# Patient Record
Sex: MALE | Race: BLACK OR AFRICAN AMERICAN
[De-identification: names, ages, dates, MRNs, and addresses within clinical notes are randomized per-mention and may not be internally consistent; named-entity substitution may affect disease eponyms.]

---

## 2017-01-08 ENCOUNTER — HOSPITAL ENCOUNTER (EMERGENCY)
Dept: HOSPITAL 17 - NEPE | Age: 22
Discharge: HOME | End: 2017-01-08
Payer: COMMERCIAL

## 2017-01-08 VITALS — WEIGHT: 141.1 LBS | HEIGHT: 66 IN | BODY MASS INDEX: 22.68 KG/M2

## 2017-01-08 VITALS
HEART RATE: 114 BPM | OXYGEN SATURATION: 95 % | SYSTOLIC BLOOD PRESSURE: 126 MMHG | RESPIRATION RATE: 24 BRPM | DIASTOLIC BLOOD PRESSURE: 101 MMHG

## 2017-01-08 VITALS
OXYGEN SATURATION: 98 % | RESPIRATION RATE: 18 BRPM | DIASTOLIC BLOOD PRESSURE: 62 MMHG | HEART RATE: 87 BPM | TEMPERATURE: 98.1 F | SYSTOLIC BLOOD PRESSURE: 123 MMHG

## 2017-01-08 VITALS — RESPIRATION RATE: 18 BRPM | OXYGEN SATURATION: 99 %

## 2017-01-08 DIAGNOSIS — Z86.2: ICD-10-CM

## 2017-01-08 DIAGNOSIS — Z86.59: ICD-10-CM

## 2017-01-08 DIAGNOSIS — Z87.19: ICD-10-CM

## 2017-01-08 DIAGNOSIS — Z87.01: ICD-10-CM

## 2017-01-08 DIAGNOSIS — R07.9: ICD-10-CM

## 2017-01-08 DIAGNOSIS — M25.50: ICD-10-CM

## 2017-01-08 DIAGNOSIS — D57.00: Primary | ICD-10-CM

## 2017-01-08 DIAGNOSIS — Z86.79: ICD-10-CM

## 2017-01-08 LAB
ALP SERPL-CCNC: 85 U/L (ref 45–117)
ALT SERPL-CCNC: 22 U/L (ref 12–78)
ANION GAP SERPL CALC-SCNC: 9 MEQ/L (ref 5–15)
AST SERPL-CCNC: 66 U/L (ref 15–37)
BASOPHILS # BLD AUTO: 0.1 TH/MM3 (ref 0–0.2)
BASOPHILS NFR BLD AUTO: 1 % (ref 0–2)
BASOPHILS NFR BLD: 0.4 % (ref 0–2)
BILIRUB SERPL-MCNC: 3.2 MG/DL (ref 0.2–1)
BUN SERPL-MCNC: 8 MG/DL (ref 7–18)
CHLORIDE SERPL-SCNC: 109 MEQ/L (ref 98–107)
EOSINOPHIL # BLD: 0.5 TH/MM3 (ref 0–0.4)
EOSINOPHIL NFR BLD: 3 % (ref 0–4)
EOSINOPHIL NFR BLD: 4 % (ref 0–4)
ERYTHROCYTE [DISTWIDTH] IN BLOOD BY AUTOMATED COUNT: 18.4 % (ref 11.6–17.2)
GFR SERPLBLD BASED ON 1.73 SQ M-ARVRAT: 185 ML/MIN (ref 89–?)
HCO3 BLD-SCNC: 24.1 MEQ/L (ref 21–32)
HCT VFR BLD CALC: 23.9 % (ref 39–51)
HEMO FLAGS: (no result)
HGB S BLD QL SOLY: (no result)
HOWELL-JOLLY BOD BLD QL SMEAR: PRESENT
LYMPHOCYTES # BLD AUTO: 8.8 TH/MM3 (ref 1–4.8)
LYMPHOCYTES NFR BLD AUTO: 50.7 % (ref 9–44)
MCH RBC QN AUTO: 26.2 PG (ref 27–34)
MCHC RBC AUTO-ENTMCNC: 33 % (ref 32–36)
MCV RBC AUTO: 79.3 FL (ref 80–100)
MONOCYTES NFR BLD: 5.9 % (ref 0–8)
MYELOCYTES NFR BLD: 1 % (ref 0–0)
NEUTROPHILS # BLD AUTO: 7 TH/MM3 (ref 1.8–7.7)
NEUTROPHILS NFR BLD AUTO: 40 % (ref 16–70)
NEUTS BAND # BLD MANUAL: 6.1 TH/MM3 (ref 1.8–7.7)
NEUTS BAND NFR BLD: 2 % (ref 0–6)
NEUTS SEG NFR BLD MANUAL: 32 % (ref 16–70)
PLAT MORPH BLD: NORMAL
PLATELET # BLD: 257 TH/MM3 (ref 150–450)
PLATELET BLD QL SMEAR: NORMAL
POLYCHROMASIA BLD QL SMEAR: 3.4 % (ref 0–1.9)
POTASSIUM SERPL-SCNC: 4.8 MEQ/L (ref 3.5–5.1)
RBC # BLD AUTO: 3.02 MIL/MM3 (ref 4.5–5.9)
RETICS/RBC NFR: 11.1 % (ref 0.4–3)
REVIEW FLAG: (no result)
SCAN/DIFF: (no result)
SCHISTOCYTES BLD QL SMEAR: (no result)
SODIUM SERPL-SCNC: 142 MEQ/L (ref 136–145)
WBC # BLD AUTO: 17.4 TH/MM3 (ref 4–11)
WBC DIFF SAMPLE: 100
WBC NRBC COR # BLD: 2 /100 WBC (ref 0–0)

## 2017-01-08 PROCEDURE — 99284 EMERGENCY DEPT VISIT MOD MDM: CPT

## 2017-01-08 PROCEDURE — 96374 THER/PROPH/DIAG INJ IV PUSH: CPT

## 2017-01-08 PROCEDURE — 85044 MANUAL RETICULOCYTE COUNT: CPT

## 2017-01-08 PROCEDURE — 96376 TX/PRO/DX INJ SAME DRUG ADON: CPT

## 2017-01-08 PROCEDURE — 85007 BL SMEAR W/DIFF WBC COUNT: CPT

## 2017-01-08 PROCEDURE — 96375 TX/PRO/DX INJ NEW DRUG ADDON: CPT

## 2017-01-08 PROCEDURE — 71010: CPT

## 2017-01-08 PROCEDURE — 80053 COMPREHEN METABOLIC PANEL: CPT

## 2017-01-08 PROCEDURE — 85027 COMPLETE CBC AUTOMATED: CPT

## 2017-01-08 NOTE — PD
HPI


Chief Complaint:  pain


Time Seen by Provider:  07:26


Travel History


International Travel<30 days:  No


Contact w/Intl Traveler<30days:  No





History of Present Illness


HPI


This is a 21-year-old male who has a history sickle cell disease who presents 

to the emergency department with pain all over his body, worse in his chest, 

arms and legs.  He says it started abruptly at 3 AM this morning.  Patient 

provides very limited history because he is moaning in pain and is poorly 

cooperative with questioning.





PFSH


Past Medical History


Hx Anticoagulant Therapy:  No


Anemia:  Yes


Arthritis:  No


Asthma:  No


Autoimmune Disease:  No


Blood Disorders:  Yes ( G6PD DEFICIENCY)


Anxiety:  No


Depression:  No


Heart Rhythm Problems:  No


Cancer:  No


Cardiovascular Problems:  Yes (heart murmur)


High Cholesterol:  No


Chemotherapy:  No


Chest Pain:  Yes


Congestive Heart Failure:  No


COPD:  No


Cerebrovascular Accident:  No


Diabetes:  No


Diminished Hearing:  No


Endocrine:  No


Gastrointestinal Disorders:  Yes (gall stones)


GERD:  No


Genitourinary:  No


Hiatal Hernia:  No


Immune Disorder:  Yes (SICKLE CELL)


Implanted Vascular Access Dvce:  No


Kidney Stones:  No


Musculoskeletal:  No


Neurologic:  No


Psychiatric:  Yes


Reproductive:  No


Respiratory:  No


Immunizations Current:  Yes


Migraines:  No


Pneumonia:  Yes


Radiation Therapy:  No


Renal Failure:  No


Seizures:  No


Sickle Cell Disease:  Yes


Sleep Apnea:  No


Thyroid Disease:  No


Ulcer:  No





Past Surgical History


Abdominal Surgery:  No


AICD:  No


Arteriovenous Shunt:  No


Cardiac Surgery:  No


Ear Surgery:  No


Endocrine Surgery:  No


Eye Surgery:  No


Genitourinary Surgery:  No


Gynecologic Surgery:  No


Hysterectomy:  No


Insulin Pump:  No


Joint Replacement:  No


Oral Surgery:  Yes (dental surgery "AS A SMALL CHILD")


Pacemaker:  No


Thoracic Surgery:  No


Other Surgery:  Yes (PICC LINE /picc line removal)





Social History


Alcohol Use:  No


Tobacco Use:  No


Substance Use:  No





Allergies-Medications


(Allergen,Severity, Reaction):  


Coded Allergies:  


     Sulfa (Verified  Allergy, Intermediate, 1/8/17)


     Morphine (Verified  Allergy, Unknown, 1/8/17)


Uncoded Allergies:  


     LEONARD BEANS (Allergy, Intermediate, 10/10/14)


Reported Meds & Prescriptions





Reported Meds & Active Scripts


Active


Percocet (Oxycodone-Acetaminophen)  mg Tab 1 Tab PO Q4H PRN


Reported


Hydroxyurea 500 Mg Cap 500 Mg PO DAILY


Folic Acid 400 Mcg Tab 400 Mcg PO DAILY








Review of Systems


ROS Limitations:  Uncooperative





Physical Exam


Narrative


GENERAL: Uncomfortable appearing, writhing in the bed


SKIN: Warm and dry.


HEAD: Atraumatic. Normocephalic. 


EYES: Pupils equal and round.  No injection or drainage. 


ENT:  Moist mucous membranes


NECK: Trachea midline. 


CARDIOVASCULAR: Tachycardic No murmur appreciated.


RESPIRATORY: Clear to auscultation. Breath sounds equal bilaterally. 


GASTROINTESTINAL: Abdomen soft, non-tender, nondistended. 


MUSCULOSKELETAL: No obvious deformities. 


NEUROLOGICAL: Awake and alert. No obvious cranial nerve deficits.  Moving all 

extremities.


PSYCHIATRIC: Appropriate mood and affect; insight and judgment normal.





Data


Data


Last Documented VS





Vital Signs








  Date Time  Temp Pulse Resp B/P Pulse Ox O2 Delivery O2 Flow Rate FiO2


 


1/8/17 09:03 98.1 87 18 123/62 98 Room Air  








Orders








 Complete Blood Count With Diff (1/8/17 07:28)


Comprehensive Metabolic Panel (1/8/17 07:28)


Retic Count (1/8/17 07:28)


Chest, Single Ap (1/8/17 07:28)


Ecg Monitoring (1/8/17 07:28)


Iv Access Insert/Monitor (1/8/17 07:28)


Oximetry (1/8/17 07:28)


Ondansetron Inj (Zofran Inj) (1/8/17 07:30)


Sodium Chloride 0.9% Flush (Ns Flush) (1/8/17 07:30)


Sodium Chlor 0.9% 1000 Ml Inj (Ns 1000 M (1/8/17 07:28)


Hydromorphone Pf Inj (Dilaudid Pf Inj) (1/8/17 07:30)


Hydromorphone Pf Inj (Dilaudid Pf Inj) (1/8/17 08:01)


Ondansetron Inj (Zofran Inj) (1/8/17 08:01)


Diphenhydramine Inj (Benadryl Inj) (1/8/17 08:24)


Hydromorphone Pf Inj (Dilaudid Pf Inj) (1/8/17 08:45)


Diphenhydramine Inj (Benadryl Inj) (1/8/17 08:45)


Hydromorphone Pf Inj (Dilaudid Pf Inj) (1/8/17 08:38)








Labs








 Laboratory Tests








Test 1/8/17





 07:37


 


White Blood Count 17.4 TH/MM3


 


Red Blood Count 3.02 MIL/MM3


 


Hemoglobin 7.9 GM/DL


 


Hematocrit 23.9 %


 


Mean Corpuscular Volume 79.3 FL


 


Mean Corpuscular Hemoglobin 26.2 PG


 


Mean Corpuscular Hemoglobin 33.0 %





Concent 


 


Red Cell Distribution Width 18.4 %


 


Platelet Count 257 TH/MM3


 


Mean Platelet Volume 8.2 FL


 


Neutrophils (%) (Auto) 40.0 %


 


Lymphocytes (%) (Auto) 50.7 %


 


Monocytes (%) (Auto) 5.9 %


 


Eosinophils (%) (Auto) 3.0 %


 


Basophils (%) (Auto) 0.4 %


 


Neutrophils # (Auto) 7.0 TH/MM3


 


Lymphocytes # (Auto) 8.8 TH/MM3


 


Monocytes # (Auto) 1.0 TH/MM3


 


Eosinophils # (Auto) 0.5 TH/MM3


 


Basophils # (Auto) 0.1 TH/MM3


 


CBC Comment AUTO DIFF 


 


Differential Total Cells 100 





Counted 


 


Neutrophils % (Manual) 32 %


 


Band Neutrophils % 2 %


 


Lymphocytes % 57 %


 


Monocytes % 3 %


 


Eosinophils % 4 %


 


Basophils % 1 %


 


Neutrophils # (Manual) 6.1 TH/MM3


 


Myelocytes 1 %


 


Nucleated Red Blood Cells 2 /100 WBC


 


Differential Comment FINAL DIFF





 MANUAL


 


Platelet Estimate NORMAL 


 


Platelet Morphology Comment NORMAL 


 


Polychromasia 3.4 %


 


Sickle Cells 1+ 


 


[place] Bodies PRESENT 


 


Keratocytes OCC 


 


Reticulocyte Count 11.1 %


 


Absolute Reticulocyte Count 336.3 MIL/L


 


Sodium Level 142 MEQ/L


 


Potassium Level 4.8 MEQ/L


 


Chloride Level 109 MEQ/L


 


Carbon Dioxide Level 24.1 MEQ/L


 


Anion Gap 9 MEQ/L


 


Blood Urea Nitrogen 8 MG/DL


 


Creatinine 0.65 MG/DL


 


Estimat Glomerular Filtration 185 ML/MIN





Rate 


 


Random Glucose 99 MG/DL


 


Calcium Level 8.9 MG/DL


 


Total Bilirubin 3.2 MG/DL


 


Aspartate Amino Transf 66 U/L





(AST/SGOT) 


 


Alanine Aminotransferase 22 U/L





(ALT/SGPT) 


 


Alkaline Phosphatase 85 U/L


 


Total Protein 8.1 GM/DL


 


Albumin 4.7 GM/DL














MDM


Medical Decision Making


Medical Screen Exam Complete:  Yes


Emergency Medical Condition:  Yes


Interpretation(s)


Afebrile, mild tachycardia, hypertensive


Leukocytosis with lymphocytic shift


Hemoglobin is 7.9


Electrolytes are reassuring with a total bilirubin of 3.2





Last 24 hours Impressions








Chest X-Ray 1/8/17 0728 Signed





Impressions: 





 Service Date/Time:  Sunday, January 8, 2017 07:57 - CONCLUSION: No acute 





 disease.       Zain Murray MD  FACR








Differential Diagnosis


Pneumonia, acute chest syndrome, pulmonary embolism, vaso-occlusive crisis


Narrative Course


This is a 21-year-old male who presents to the emergency department with severe 

pain in his joints and chest.  Patient has a history of sickle cell disease.  I 

suspect his crisis may been precipitated by the cold weather.  He was placed on 

a monitor and an IV was established.  He was given 3 mg of IV Dilaudid as well 

as IV hydration.  He does have a white count of 17 with a lymphocytic shift.  

He has no fever and no symptoms of infection in his chest is reassuring so I 

suspect this is a stress response in the setting of his crisis.  I offered the 

patient admission but the patient would like to go home.  Patient will be 

discharged and has pain medication at home.  He was told to return to the 

emergency department if his symptoms worsen





Diagnosis





 Primary Impression:  


 Vaso-occlusive sickle cell crisis


Patient Instructions:  General Instructions





***Additional Instructions:


If you develop severe chest pain, shortness of breath, sweating, lightheadedness

, dizziness or difficulty breathing return to the emergency department 

immediately.


Followup with your primary care physician in 2-3 days if your symptoms are not 

resolved.


***Med/Other Pt SpecificInfo:  No Change to Meds


Disposition:  01 DISCHARGE HOME


Condition:  Stable








Josephine Zamora MD Jan 8, 2017 07:30

## 2017-01-08 NOTE — RADRPT
EXAM DATE/TIME:  01/08/2017 07:57 

 

[facility] COMPARISON:     

CHEST SINGLE AP, November 08, 2016, 4:46.

 

                     

INDICATIONS :     

Shortness of breath and pain from sickle cell crisis.

                     

 

MEDICAL HISTORY :     

Sickle Cell disease.          

 

SURGICAL HISTORY :     

None.   

 

ENCOUNTER:     

Initial                                        

 

ACUITY:     

2 days      

 

PAIN SCORE:     

7/10

 

LOCATION:     

Bilateral chest 

 

FINDINGS:     

A single view of the chest demonstrates the lungs to be symmetrically aerated without evidence of mas
s, infiltrate or effusion.  The cardiomediastinal contours are unremarkable.  Osseous structures are 
intact.

 

CONCLUSION:     No acute disease.  

 

 

 

 Zain Murray MD FACR on January 08, 2017 at 8:11           

Board Certified Radiologist.

 This report was verified electronically.

## 2017-01-23 ENCOUNTER — HOSPITAL ENCOUNTER (EMERGENCY)
Dept: HOSPITAL 17 - NEPC | Age: 22
Discharge: HOME | End: 2017-01-23
Payer: COMMERCIAL

## 2017-01-23 VITALS
OXYGEN SATURATION: 100 % | HEART RATE: 90 BPM | TEMPERATURE: 97.6 F | DIASTOLIC BLOOD PRESSURE: 72 MMHG | RESPIRATION RATE: 20 BRPM | SYSTOLIC BLOOD PRESSURE: 121 MMHG

## 2017-01-23 VITALS — HEIGHT: 67 IN | BODY MASS INDEX: 21.97 KG/M2 | WEIGHT: 139.99 LBS

## 2017-01-23 VITALS — SYSTOLIC BLOOD PRESSURE: 126 MMHG | DIASTOLIC BLOOD PRESSURE: 77 MMHG

## 2017-01-23 VITALS — RESPIRATION RATE: 16 BRPM

## 2017-01-23 DIAGNOSIS — D57.00: Primary | ICD-10-CM

## 2017-01-23 DIAGNOSIS — Z79.899: ICD-10-CM

## 2017-01-23 DIAGNOSIS — R01.1: ICD-10-CM

## 2017-01-23 LAB
ANION GAP SERPL CALC-SCNC: 9 MEQ/L (ref 5–15)
BASOPHILS # BLD AUTO: 0.1 TH/MM3 (ref 0–0.2)
BASOPHILS NFR BLD: 0.6 % (ref 0–2)
BUN SERPL-MCNC: 12 MG/DL (ref 7–18)
CHLORIDE SERPL-SCNC: 108 MEQ/L (ref 98–107)
EOSINOPHIL # BLD: 0.3 TH/MM3 (ref 0–0.4)
EOSINOPHIL NFR BLD: 2.8 % (ref 0–4)
EOSINOPHIL NFR BLD: 3 % (ref 0–4)
ERYTHROCYTE [DISTWIDTH] IN BLOOD BY AUTOMATED COUNT: 16 % (ref 11.6–17.2)
GFR SERPLBLD BASED ON 1.73 SQ M-ARVRAT: 147 ML/MIN (ref 89–?)
HCO3 BLD-SCNC: 26.5 MEQ/L (ref 21–32)
HCT VFR BLD CALC: 25.2 % (ref 39–51)
HEMO FLAGS: (no result)
HGB S BLD QL SOLY: (no result)
HOWELL-JOLLY BOD BLD QL SMEAR: PRESENT
LYMPHOCYTES # BLD AUTO: 4 TH/MM3 (ref 1–4.8)
LYMPHOCYTES NFR BLD AUTO: 38 % (ref 9–44)
MCH RBC QN AUTO: 26.3 PG (ref 27–34)
MCHC RBC AUTO-ENTMCNC: 33.9 % (ref 32–36)
MCV RBC AUTO: 77.6 FL (ref 80–100)
MONOCYTES NFR BLD: 9.7 % (ref 0–8)
MYELOCYTES NFR BLD: 1 % (ref 0–0)
NEUTROPHILS # BLD AUTO: 5.2 TH/MM3 (ref 1.8–7.7)
NEUTROPHILS NFR BLD AUTO: 48.9 % (ref 16–70)
NEUTS BAND # BLD MANUAL: 5.2 TH/MM3 (ref 1.8–7.7)
NEUTS BAND NFR BLD: 2 % (ref 0–6)
NEUTS SEG NFR BLD MANUAL: 46 % (ref 16–70)
PLAT MORPH BLD: NORMAL
PLATELET # BLD: 289 TH/MM3 (ref 150–450)
PLATELET BLD QL SMEAR: NORMAL
POLYCHROMASIA BLD QL SMEAR: 3 % (ref 0–1.9)
POTASSIUM SERPL-SCNC: 3.8 MEQ/L (ref 3.5–5.1)
RBC # BLD AUTO: 3.24 MIL/MM3 (ref 4.5–5.9)
SCAN/DIFF: (no result)
SODIUM SERPL-SCNC: 143 MEQ/L (ref 136–145)
TARGETS BLD QL SMEAR: (no result)
WBC # BLD AUTO: 10.7 TH/MM3 (ref 4–11)
WBC DIFF SAMPLE: 100
WBC NRBC COR # BLD: 4 /100 WBC (ref 0–0)

## 2017-01-23 PROCEDURE — 99284 EMERGENCY DEPT VISIT MOD MDM: CPT

## 2017-01-23 PROCEDURE — 96361 HYDRATE IV INFUSION ADD-ON: CPT

## 2017-01-23 PROCEDURE — 80048 BASIC METABOLIC PNL TOTAL CA: CPT

## 2017-01-23 PROCEDURE — 85027 COMPLETE CBC AUTOMATED: CPT

## 2017-01-23 PROCEDURE — 96375 TX/PRO/DX INJ NEW DRUG ADDON: CPT

## 2017-01-23 PROCEDURE — 96376 TX/PRO/DX INJ SAME DRUG ADON: CPT

## 2017-01-23 PROCEDURE — 96374 THER/PROPH/DIAG INJ IV PUSH: CPT

## 2017-01-23 PROCEDURE — 85007 BL SMEAR W/DIFF WBC COUNT: CPT

## 2017-01-23 NOTE — PD
Physical Exam


Date Seen by Provider:  Jan 23, 2017


Time Seen by Provider:  07:00


Narrative


Patient signed out to me by Dr. Matthew at 7 AM, please see previous notes for 

further details.  Patient has history of sickle cell disease, here with an 

exacerbation, IV fluids and pain medications have been ordered for the patient.

  He was given additional dose of medications and on reevaluation at 8:50 AM, 

he is feeling improved and feels ready to go home.  At this point, lab work did 

not indicate any significant metabolic issues, infections, or significant 

anemia.  H&H appears to be baseline for this patient.  My plan would be to 

release him with follow-up to his hematologist.  Return for any worsening in 

symptoms as necessary.  The plan has been discussed with the patient and he 

states understanding.  Vital signs are stable in the ER.








Laboratory Tests








Test 1/23/17





 06:55


 


Red Blood Count 3.24 MIL/MM3





 (4.50-5.90)


 


Hemoglobin 8.5 GM/DL





 (13.0-17.0)


 


Hematocrit 25.2 %





 (39.0-51.0)


 


Mean Corpuscular Volume 77.6 FL





 (80.0-100.0)


 


Mean Corpuscular Hemoglobin 26.3 PG





 (27.0-34.0)


 


Monocytes (%) (Auto) 9.7 % (0.0-8.0)


 


Monocytes # (Auto) 1.0 TH/MM3





 (0-0.9)


 


Lymphocytes % 45 % (9-44)


 


Myelocytes 1 % (0-0)


 


Nucleated Red Blood Cells 4 /100 WBC





 (0-0)


 


Polychromasia 3.0 % (0.0-1.9)


 


Sickle Cells 1+  (NORMAL)


 


Target Cells 1+  (NORMAL)


 


Chloride Level 108 MEQ/L





 ()











Data


Data


Last Documented VS





Vital Signs








  Date Time  Temp Pulse Resp B/P Pulse Ox O2 Delivery O2 Flow Rate FiO2


 


1/23/17 07:40   16     


 


1/23/17 06:09 97.6 90  121/72 100   








Orders





 Basic Metabolic Panel (Bmp) (1/23/17 06:45)


Complete Blood Count With Diff (1/23/17 06:45)


Ecg Monitoring (1/23/17 06:45)


Iv Access Insert/Monitor (1/23/17 06:45)


Oximetry (1/23/17 06:45)


Ondansetron Inj (Zofran Inj) (1/23/17 06:45)


Sodium Chloride 0.9% Flush (Ns Flush) (1/23/17 06:45)


Sodium Chlor 0.9% 1000 Ml Inj (Ns 1000 M (1/23/17 06:45)


Hydromorphone Pf Inj (Dilaudid Pf Inj) (1/23/17 06:45)


Diphenhydramine Inj (Benadryl Inj) (1/23/17 06:45)


Hydromorphone Pf Inj (Dilaudid Pf Inj) (1/23/17 08:00)





Labs





 Laboratory Tests








Test 1/23/17





 06:55


 


White Blood Count 10.7 TH/MM3


 


Red Blood Count 3.24 MIL/MM3


 


Hemoglobin 8.5 GM/DL


 


Hematocrit 25.2 %


 


Mean Corpuscular Volume 77.6 FL


 


Mean Corpuscular Hemoglobin 26.3 PG


 


Mean Corpuscular Hemoglobin 33.9 %





Concent 


 


Red Cell Distribution Width 16.0 %


 


Platelet Count 289 TH/MM3


 


Mean Platelet Volume 8.9 FL


 


Neutrophils (%) (Auto) 48.9 %


 


Lymphocytes (%) (Auto) 38.0 %


 


Monocytes (%) (Auto) 9.7 %


 


Eosinophils (%) (Auto) 2.8 %


 


Basophils (%) (Auto) 0.6 %


 


Neutrophils # (Auto) 5.2 TH/MM3


 


Lymphocytes # (Auto) 4.0 TH/MM3


 


Monocytes # (Auto) 1.0 TH/MM3


 


Eosinophils # (Auto) 0.3 TH/MM3


 


Basophils # (Auto) 0.1 TH/MM3


 


CBC Comment AUTO DIFF 


 


Differential Total Cells 100 





Counted 


 


Neutrophils % (Manual) 46 %


 


Band Neutrophils % 2 %


 


Lymphocytes % 45 %


 


Monocytes % 3 %


 


Eosinophils % 3 %


 


Neutrophils # (Manual) 5.2 TH/MM3


 


Myelocytes 1 %


 


Nucleated Red Blood Cells 4 /100 WBC


 


Differential Comment FINAL DIFF





 MANUAL


 


Platelet Estimate NORMAL 


 


Platelet Morphology Comment NORMAL 


 


Polychromasia 3.0 %


 


Sickle Cells 1+ 


 


Target Cells 1+ 


 


[place] Bodies PRESENT 


 


Red Cell Morphology Comment  


 


Sodium Level 143 MEQ/L


 


Potassium Level 3.8 MEQ/L


 


Chloride Level 108 MEQ/L


 


Carbon Dioxide Level 26.5 MEQ/L


 


Anion Gap 9 MEQ/L


 


Blood Urea Nitrogen 12 MG/DL


 


Creatinine 0.79 MG/DL


 


Estimat Glomerular Filtration 147 ML/MIN





Rate 


 


Random Glucose 77 MG/DL


 


Calcium Level 9.0 MG/DL











[facility]


Medical Record Reviewed:  Yes


Supervised Visit with EDA:  No


Diagnosis





 Primary Impression:  


 Sickle cell disease with crisis


***Med/Other Pt SpecificInfo:  Prescription(s) given


Scripts


Oxycodone-Acetaminophen (Percocet)5-325 mg Tab1-2 Tab PO Q6H PRN (PAIN) #20 TAB

  Ref 0


   Prov:Agata Pratt MD         1/23/17


Disposition:  01 DISCHARGE HOME


Condition:  Stable








Agata Pratt MD Jan 23, 2017 08:58

## 2017-01-23 NOTE — PD
HPI


Chief Complaint:  Sickle Cell


Time Seen by Provider:  06:22


Travel History


International Travel<30 days:  No


Contact w/Intl Traveler<30days:  No


Traveled to known affect area:  No





History of Present Illness


HPI


21-year-old male history sickle cell disease arrives complaining of pain in his 

lower back and his legs bilaterally.  It's constant and severe.  It's typical 

for a sickle cell type pain syndrome for the patient.  It started about 9 hours 

prior to ER arrival.  He tried over-the-counter Tylenol did not work.  He has 

been compliant with hydroxyurea and folate.  He follows with Dr. Mendoza 

hematology oncology.  He's had no fever.





PFSH


Past Medical History


Hx Anticoagulant Therapy:  No


Anemia:  Yes


Arthritis:  No


Asthma:  No


Autoimmune Disease:  No


Blood Disorders:  Yes ( G6PD DEFICIENCY)


Anxiety:  No


Depression:  No


Heart Rhythm Problems:  No


Cancer:  No


Cardiovascular Problems:  Yes (heart murmur)


High Cholesterol:  No


Chemotherapy:  No


Chest Pain:  Yes


Congestive Heart Failure:  No


COPD:  No


Cerebrovascular Accident:  No


Diabetes:  No


Diminished Hearing:  No


Endocrine:  No


Gastrointestinal Disorders:  Yes (gall stones)


GERD:  No


Genitourinary:  No


Hiatal Hernia:  No


Immune Disorder:  Yes (SICKLE CELL)


Implanted Vascular Access Dvce:  No


Kidney Stones:  No


Medical other:  Yes (g6pd deficiency)


Musculoskeletal:  No


Neurologic:  No


Psychiatric:  Yes


Reproductive:  No


Respiratory:  No


Immunizations Current:  Yes


Migraines:  No


Pneumonia:  Yes


Radiation Therapy:  No


Renal Failure:  No


Seizures:  No


Sickle Cell Disease:  Yes


Sleep Apnea:  No


Thyroid Disease:  No


Ulcer:  No





Past Surgical History


Abdominal Surgery:  No


AICD:  No


Arteriovenous Shunt:  No


Cardiac Surgery:  No


Ear Surgery:  No


Endocrine Surgery:  No


Eye Surgery:  No


Genitourinary Surgery:  No


Gynecologic Surgery:  No


Hysterectomy:  No


Insulin Pump:  No


Joint Replacement:  No


Oral Surgery:  Yes (dental surgery "AS A SMALL CHILD")


Pacemaker:  No


Thoracic Surgery:  No


Other Surgery:  Yes (PICC LINE /picc line removal)





Social History


Alcohol Use:  No


Tobacco Use:  No


Substance Use:  No





Allergies-Medications


(Allergen,Severity, Reaction):  


Coded Allergies:  


     Sulfa (Verified  Allergy, Intermediate, 1/23/17)


     Morphine (Verified  Allergy, Unknown, 1/23/17)


Uncoded Allergies:  


     LEONARD BEANS (Allergy, Intermediate, 10/10/14)


Reported Meds & Prescriptions





Reported Meds & Active Scripts


Active


Reported


Hydroxyurea 500 Mg Cap 500 Mg PO DAILY


Folic Acid 400 Mcg Tab 400 Mcg PO DAILY








Review of Systems


Except as stated in HPI:  all other systems reviewed are Neg





Physical Exam


Narrative


GENERAL: 21-year-old male mild distress


SKIN: Warm and dry.


HEAD: Atraumatic. Normocephalic. 


EYES: Pupils equal and round. No scleral icterus. No injection or drainage. 


ENT: No nasal bleeding or discharge.  Mucous membranes pink and moist.


NECK: Trachea midline. No JVD. 


CARDIOVASCULAR: Regular rate and rhythm.  No murmur appreciated.


RESPIRATORY: No accessory muscle use. Clear to auscultation. Breath sounds 

equal bilaterally. 


GASTROINTESTINAL: Abdomen soft, non-tender, nondistended. Hepatic and splenic 

margins not palpable. 


MUSCULOSKELETAL: No obvious deformities. No clubbing.  No cyanosis.  No edema. 


NEUROLOGICAL: Awake and alert. No obvious cranial nerve deficits.  Motor 

grossly within normal limits. Normal speech.


PSYCHIATRIC: Appropriate mood and affect; insight and judgment normal.





Data


Data


Last Documented VS





Vital Signs








  Date Time  Temp Pulse Resp B/P Pulse Ox O2 Delivery O2 Flow Rate FiO2


 


1/23/17 06:09 97.6 90 20 121/72 100   








Orders





 Basic Metabolic Panel (Bmp) (1/23/17 06:45)


Complete Blood Count With Diff (1/23/17 06:45)


Ecg Monitoring (1/23/17 06:45)


Iv Access Insert/Monitor (1/23/17 06:45)


Oximetry (1/23/17 06:45)


Ondansetron Inj (Zofran Inj) (1/23/17 06:45)


Sodium Chloride 0.9% Flush (Ns Flush) (1/23/17 06:45)


Sodium Chlor 0.9% 1000 Ml Inj (Ns 1000 M (1/23/17 06:45)


Hydromorphone Pf Inj (Dilaudid Pf Inj) (1/23/17 06:45)


Diphenhydramine Inj (Benadryl Inj) (1/23/17 06:45)








MDM


Medical Decision Making


Medical Screen Exam Complete:  Yes


Emergency Medical Condition:  Yes


Medical Record Reviewed:  Yes


Differential Diagnosis


Sickle cell crisis, anemia, electrolyte imbalance, septic arthritis, avascular 

necrosis, pulmonary embolism, acute chest syndrome


Narrative Course


The patient has received abortive pain control.  IV fluids initiated.  Oncoming 

provider to reassess and disposition patient.








Thanh Matthew MD Jan 23, 2017 06:58

## 2017-02-02 ENCOUNTER — HOSPITAL ENCOUNTER (INPATIENT)
Dept: HOSPITAL 17 - NEPC | Age: 22
LOS: 2 days | Discharge: HOME | DRG: 812 | End: 2017-02-04
Attending: HOSPITALIST | Admitting: HOSPITALIST
Payer: COMMERCIAL

## 2017-02-02 VITALS
SYSTOLIC BLOOD PRESSURE: 119 MMHG | TEMPERATURE: 98.1 F | HEART RATE: 115 BPM | RESPIRATION RATE: 18 BRPM | OXYGEN SATURATION: 100 % | DIASTOLIC BLOOD PRESSURE: 66 MMHG

## 2017-02-02 VITALS
DIASTOLIC BLOOD PRESSURE: 80 MMHG | OXYGEN SATURATION: 95 % | HEART RATE: 89 BPM | SYSTOLIC BLOOD PRESSURE: 144 MMHG | TEMPERATURE: 98.6 F | RESPIRATION RATE: 16 BRPM

## 2017-02-02 VITALS
DIASTOLIC BLOOD PRESSURE: 68 MMHG | OXYGEN SATURATION: 96 % | SYSTOLIC BLOOD PRESSURE: 125 MMHG | HEART RATE: 80 BPM | TEMPERATURE: 97.5 F

## 2017-02-02 VITALS
OXYGEN SATURATION: 100 % | DIASTOLIC BLOOD PRESSURE: 98 MMHG | SYSTOLIC BLOOD PRESSURE: 138 MMHG | HEART RATE: 105 BPM | RESPIRATION RATE: 22 BRPM

## 2017-02-02 VITALS — RESPIRATION RATE: 24 BRPM

## 2017-02-02 VITALS
DIASTOLIC BLOOD PRESSURE: 56 MMHG | HEART RATE: 101 BPM | RESPIRATION RATE: 20 BRPM | OXYGEN SATURATION: 97 % | SYSTOLIC BLOOD PRESSURE: 116 MMHG

## 2017-02-02 VITALS
OXYGEN SATURATION: 95 % | SYSTOLIC BLOOD PRESSURE: 91 MMHG | HEART RATE: 110 BPM | RESPIRATION RATE: 22 BRPM | DIASTOLIC BLOOD PRESSURE: 54 MMHG

## 2017-02-02 VITALS
OXYGEN SATURATION: 99 % | TEMPERATURE: 98.4 F | SYSTOLIC BLOOD PRESSURE: 134 MMHG | DIASTOLIC BLOOD PRESSURE: 80 MMHG | RESPIRATION RATE: 16 BRPM | HEART RATE: 96 BPM

## 2017-02-02 VITALS — HEART RATE: 82 BPM

## 2017-02-02 VITALS — BODY MASS INDEX: 22.32 KG/M2 | HEIGHT: 66 IN | WEIGHT: 138.89 LBS

## 2017-02-02 VITALS
OXYGEN SATURATION: 99 % | TEMPERATURE: 97.1 F | RESPIRATION RATE: 16 BRPM | DIASTOLIC BLOOD PRESSURE: 75 MMHG | HEART RATE: 72 BPM | SYSTOLIC BLOOD PRESSURE: 127 MMHG

## 2017-02-02 VITALS
HEART RATE: 80 BPM | SYSTOLIC BLOOD PRESSURE: 126 MMHG | DIASTOLIC BLOOD PRESSURE: 83 MMHG | OXYGEN SATURATION: 94 % | RESPIRATION RATE: 15 BRPM | TEMPERATURE: 98.5 F

## 2017-02-02 VITALS
HEART RATE: 127 BPM | SYSTOLIC BLOOD PRESSURE: 130 MMHG | OXYGEN SATURATION: 94 % | TEMPERATURE: 97 F | RESPIRATION RATE: 19 BRPM | DIASTOLIC BLOOD PRESSURE: 68 MMHG

## 2017-02-02 VITALS
DIASTOLIC BLOOD PRESSURE: 59 MMHG | OXYGEN SATURATION: 94 % | SYSTOLIC BLOOD PRESSURE: 100 MMHG | HEART RATE: 108 BPM | RESPIRATION RATE: 22 BRPM

## 2017-02-02 DIAGNOSIS — D57.00: Primary | ICD-10-CM

## 2017-02-02 DIAGNOSIS — I51.7: ICD-10-CM

## 2017-02-02 DIAGNOSIS — D72.829: ICD-10-CM

## 2017-02-02 DIAGNOSIS — E74.01: ICD-10-CM

## 2017-02-02 DIAGNOSIS — Z88.2: ICD-10-CM

## 2017-02-02 DIAGNOSIS — I44.0: ICD-10-CM

## 2017-02-02 DIAGNOSIS — R65.10: ICD-10-CM

## 2017-02-02 DIAGNOSIS — R00.0: ICD-10-CM

## 2017-02-02 DIAGNOSIS — J98.11: ICD-10-CM

## 2017-02-02 DIAGNOSIS — Z88.5: ICD-10-CM

## 2017-02-02 LAB
ACANTHOCYTES BLD QL SMEAR: (no result)
ALP SERPL-CCNC: 73 U/L (ref 45–117)
ALT SERPL-CCNC: 30 U/L (ref 12–78)
ANION GAP SERPL CALC-SCNC: 6 MEQ/L (ref 5–15)
AST SERPL-CCNC: 38 U/L (ref 15–37)
BASOPHILS # BLD AUTO: 0.1 TH/MM3 (ref 0–0.2)
BASOPHILS NFR BLD: 0.5 % (ref 0–2)
BILIRUB SERPL-MCNC: 3.6 MG/DL (ref 0.2–1)
BUN SERPL-MCNC: 11 MG/DL (ref 7–18)
CHLORIDE SERPL-SCNC: 110 MEQ/L (ref 98–107)
EOSINOPHIL # BLD: 0.3 TH/MM3 (ref 0–0.4)
EOSINOPHIL NFR BLD: 1 % (ref 0–4)
EOSINOPHIL NFR BLD: 1.4 % (ref 0–4)
ERYTHROCYTE [DISTWIDTH] IN BLOOD BY AUTOMATED COUNT: 17 % (ref 11.6–17.2)
GFR SERPLBLD BASED ON 1.73 SQ M-ARVRAT: 191 ML/MIN (ref 89–?)
HCO3 BLD-SCNC: 27.3 MEQ/L (ref 21–32)
HCT VFR BLD CALC: 23.1 % (ref 39–51)
HEMO FLAGS: (no result)
HGB S BLD QL SOLY: (no result)
HOWELL-JOLLY BOD BLD QL SMEAR: PRESENT
LYMPHOCYTES # BLD AUTO: 5.4 TH/MM3 (ref 1–4.8)
LYMPHOCYTES NFR BLD AUTO: 24.8 % (ref 9–44)
MCH RBC QN AUTO: 26.3 PG (ref 27–34)
MCHC RBC AUTO-ENTMCNC: 33.7 % (ref 32–36)
MCV RBC AUTO: 78 FL (ref 80–100)
MONOCYTES NFR BLD: 5.2 % (ref 0–8)
NEUTROPHILS # BLD AUTO: 14.8 TH/MM3 (ref 1.8–7.7)
NEUTROPHILS NFR BLD AUTO: 68.1 % (ref 16–70)
NEUTS BAND # BLD MANUAL: 15.9 TH/MM3 (ref 1.8–7.7)
NEUTS BAND NFR BLD: 2 % (ref 0–6)
NEUTS SEG NFR BLD MANUAL: 71 % (ref 16–70)
PLAT MORPH BLD: NORMAL
PLATELET # BLD: 225 TH/MM3 (ref 150–450)
PLATELET BLD QL SMEAR: NORMAL
POLYCHROMASIA BLD QL SMEAR: 8.5 % (ref 0–1.9)
POTASSIUM SERPL-SCNC: 3.5 MEQ/L (ref 3.5–5.1)
RBC # BLD AUTO: 2.96 MIL/MM3 (ref 4.5–5.9)
RETICS/RBC NFR: 9.3 % (ref 0.4–3)
REVIEW FLAG: (no result)
SCAN/DIFF: (no result)
SCHISTOCYTES BLD QL SMEAR: (no result)
SODIUM SERPL-SCNC: 143 MEQ/L (ref 136–145)
TARGETS BLD QL SMEAR: (no result)
WBC # BLD AUTO: 21.8 TH/MM3 (ref 4–11)
WBC DIFF SAMPLE: 100
WBC NRBC COR # BLD: 2 /100 WBC (ref 0–0)

## 2017-02-02 PROCEDURE — 85027 COMPLETE CBC AUTOMATED: CPT

## 2017-02-02 PROCEDURE — 86900 BLOOD TYPING SEROLOGIC ABO: CPT

## 2017-02-02 PROCEDURE — 80048 BASIC METABOLIC PNL TOTAL CA: CPT

## 2017-02-02 PROCEDURE — 87040 BLOOD CULTURE FOR BACTERIA: CPT

## 2017-02-02 PROCEDURE — 86901 BLOOD TYPING SEROLOGIC RH(D): CPT

## 2017-02-02 PROCEDURE — 82728 ASSAY OF FERRITIN: CPT

## 2017-02-02 PROCEDURE — 84484 ASSAY OF TROPONIN QUANT: CPT

## 2017-02-02 PROCEDURE — 85007 BL SMEAR W/DIFF WBC COUNT: CPT

## 2017-02-02 PROCEDURE — 96374 THER/PROPH/DIAG INJ IV PUSH: CPT

## 2017-02-02 PROCEDURE — 80053 COMPREHEN METABOLIC PANEL: CPT

## 2017-02-02 PROCEDURE — P9016 RBC LEUKOCYTES REDUCED: HCPCS

## 2017-02-02 PROCEDURE — 36430 TRANSFUSION BLD/BLD COMPNT: CPT

## 2017-02-02 PROCEDURE — 86920 COMPATIBILITY TEST SPIN: CPT

## 2017-02-02 PROCEDURE — 86850 RBC ANTIBODY SCREEN: CPT

## 2017-02-02 PROCEDURE — 71010: CPT

## 2017-02-02 PROCEDURE — 93005 ELECTROCARDIOGRAM TRACING: CPT

## 2017-02-02 PROCEDURE — 30233N1 TRANSFUSION OF NONAUTOLOGOUS RED BLOOD CELLS INTO PERIPHERAL VEIN, PERCUTANEOUS APPROACH: ICD-10-PCS | Performed by: EMERGENCY MEDICINE

## 2017-02-02 PROCEDURE — 96361 HYDRATE IV INFUSION ADD-ON: CPT

## 2017-02-02 PROCEDURE — 96376 TX/PRO/DX INJ SAME DRUG ADON: CPT

## 2017-02-02 PROCEDURE — 80076 HEPATIC FUNCTION PANEL: CPT

## 2017-02-02 PROCEDURE — 86922 COMPATIBILITY TEST ANTIGLOB: CPT

## 2017-02-02 PROCEDURE — 86902 BLOOD TYPE ANTIGEN DONOR EA: CPT

## 2017-02-02 PROCEDURE — 85044 MANUAL RETICULOCYTE COUNT: CPT

## 2017-02-02 RX ADMIN — SODIUM CHLORIDE, PRESERVATIVE FREE SCH ML: 5 INJECTION INTRAVENOUS at 12:16

## 2017-02-02 RX ADMIN — ENOXAPARIN SODIUM SCH MG: 40 INJECTION SUBCUTANEOUS at 12:13

## 2017-02-02 RX ADMIN — FOLIC ACID SCH MG: 1 TABLET ORAL at 11:12

## 2017-02-02 RX ADMIN — HYDROMORPHONE HYDROCHLORIDE PRN MG: 4 INJECTION, SOLUTION INTRAMUSCULAR; INTRAVENOUS; SUBCUTANEOUS at 12:10

## 2017-02-02 RX ADMIN — DOCUSATE SODIUM SCH MG: 100 CAPSULE, LIQUID FILLED ORAL at 20:06

## 2017-02-02 RX ADMIN — HYDROMORPHONE HYDROCHLORIDE PRN MG: 4 INJECTION, SOLUTION INTRAMUSCULAR; INTRAVENOUS; SUBCUTANEOUS at 20:48

## 2017-02-02 RX ADMIN — HYDROMORPHONE HYDROCHLORIDE PRN MG: 4 INJECTION, SOLUTION INTRAMUSCULAR; INTRAVENOUS; SUBCUTANEOUS at 16:31

## 2017-02-02 RX ADMIN — SENNOSIDES SCH MG: 8.6 TABLET, FILM COATED ORAL at 12:25

## 2017-02-02 RX ADMIN — PHENYTOIN SODIUM SCH MLS/HR: 50 INJECTION INTRAMUSCULAR; INTRAVENOUS at 14:40

## 2017-02-02 RX ADMIN — PHENYTOIN SODIUM SCH MLS/HR: 50 INJECTION INTRAMUSCULAR; INTRAVENOUS at 16:31

## 2017-02-02 RX ADMIN — HYDROXYUREA SCH MG: 500 CAPSULE ORAL at 12:27

## 2017-02-02 RX ADMIN — ENOXAPARIN SODIUM SCH MG: 40 INJECTION SUBCUTANEOUS at 11:12

## 2017-02-02 RX ADMIN — SODIUM CHLORIDE, PRESERVATIVE FREE SCH ML: 5 INJECTION INTRAVENOUS at 20:05

## 2017-02-02 RX ADMIN — DOCUSATE SODIUM SCH MG: 100 CAPSULE, LIQUID FILLED ORAL at 12:25

## 2017-02-02 NOTE — RADRPT
EXAM DATE/TIME:  02/02/2017 06:43 

 

[facility] COMPARISON:     

No previous studies available for comparison.

 

                     

INDICATIONS :     

Shortness of breath.

                     

 

MEDICAL HISTORY :     

Sickle Cell disease.          

 

SURGICAL HISTORY :     

None.   

 

ENCOUNTER:     

Initial                                        

 

ACUITY:     

1 day      

 

PAIN SCORE:     

8/10

 

LOCATION:     

Bilateral chest 

 

FINDINGS:     

Heart size is enlarged. Minimal basilar atelectasis. No effusion. No pneumothorax. No focal consolida
tion.

 

CONCLUSION:     

1. Cardiomegaly. Minimal basilar atelectasis.

 

 

 

 Matteo Abdul MD on February 02, 2017 at 7:14           

Board Certified Radiologist.

 This report was verified electronically.

## 2017-02-02 NOTE — PD
HPI


Chief Complaint:  Sickle Cell


Time Seen by Provider:  04:02


Travel History


International Travel<30 days:  No


Contact w/Intl Traveler<30days:  No


Traveled to known affect area:  No





History of Present Illness


HPI


21-year-old male with history of sickle cell disease came to the emergency room 

for painful crisis.  Patient says that since 11 PM both his lower extremities 

have been hurting. Pain is 10/10.  Now it has travelled to his lower back.  He 

looks uncomfortable.  He says he has been in the emergency room many times for 

these.  His last blood transfusion was few months ago.  The pain is just there.

  Nothing in particular makes it worse or better.





PFSH


Past Medical History


*** Narrative Medical


List of his past medical history as reviewed from the nursing note.


Hx Anticoagulant Therapy:  No


Anemia:  Yes


Arthritis:  No


Asthma:  No


Autoimmune Disease:  No


Blood Disorders:  Yes ( G6PD DEFICIENCY)


Anxiety:  No


Depression:  No


Heart Rhythm Problems:  No


Cancer:  No


Cardiovascular Problems:  Yes (heart murmur)


High Cholesterol:  No


Chemotherapy:  No


Chest Pain:  Yes


Congestive Heart Failure:  No


COPD:  No


Cerebrovascular Accident:  No


Diabetes:  No


Diminished Hearing:  No


Endocrine:  No


Gastrointestinal Disorders:  Yes (gall stones)


GERD:  No


Genitourinary:  No


Hiatal Hernia:  No


Immune Disorder:  Yes (SICKLE CELL)


Implanted Vascular Access Dvce:  No


Kidney Stones:  No


Musculoskeletal:  No


Neurologic:  No


Psychiatric:  Yes


Reproductive:  No


Respiratory:  No


Immunizations Current:  Yes


Migraines:  No


Pneumonia:  Yes


Radiation Therapy:  No


Renal Failure:  No


Seizures:  No


Sickle Cell Disease:  Yes


Sleep Apnea:  No


Thyroid Disease:  No


Ulcer:  No


Tetanus Vaccination:  Unknown


Influenza Vaccination:  No





Past Surgical History


Abdominal Surgery:  No


AICD:  No


Arteriovenous Shunt:  No


Cardiac Surgery:  No


Ear Surgery:  No


Endocrine Surgery:  No


Eye Surgery:  No


Genitourinary Surgery:  No


Gynecologic Surgery:  No


Hysterectomy:  No


Insulin Pump:  No


Joint Replacement:  No


Oral Surgery:  Yes (dental surgery "AS A SMALL CHILD")


Pacemaker:  No


Thoracic Surgery:  No


Other Surgery:  Yes (PICC LINE /picc line removal)





Social History


Alcohol Use:  No


Tobacco Use:  No


Substance Use:  No





Allergies-Medications


(Allergen,Severity, Reaction):  


Coded Allergies:  


     Sulfa (Verified  Allergy, Intermediate, 2/2/17)


     Morphine (Verified  Allergy, Unknown, 2/2/17)


Uncoded Allergies:  


     LEONARD BEANS (Allergy, Intermediate, 10/10/14)


Comments


List of his allergies reviewed from the nursing note.


Reported Meds & Prescriptions





Reported Meds & Active Scripts


Active


Reported


Hydroxyurea 500 Mg Cap 500 Mg PO DAILY


Folic Acid 400 Mcg Tab 400 Mcg PO DAILY





Narrative Medication


List of his home medications reviewed from the nursing note.





Review of Systems


Except as stated in HPI:  all other systems reviewed are Neg





Physical Exam


Narrative


GENERAL: Awake, alert, severe distress, anxious


SKIN: Warm and dry.


HEAD: Atraumatic. Normocephalic. 


EYES: Pupils equal and round. No scleral icterus. No injection or drainage. 


ENT: No nasal bleeding or discharge.  Mucous membranes pink and moist.


NECK: Trachea midline. No JVD. 


CARDIOVASCULAR: Regular rate and rhythm.  No murmur appreciated.


RESPIRATORY: No accessory muscle use. Clear to auscultation. Breath sounds 

equal bilaterally. 


GASTROINTESTINAL: Abdomen soft, non-tender, nondistended. Hepatic and splenic 

margins not palpable. 


MUSCULOSKELETAL: No obvious deformities. No clubbing.  No cyanosis.  No edema. 


NEUROLOGICAL: Awake and alert. No obvious cranial nerve deficits.  Motor 

grossly within normal limits. Normal speech.


PSYCHIATRIC: Appropriate mood and affect; insight and judgment normal.





Data


Data


Last Documented VS





Vital Signs








  Date Time  Temp Pulse Resp B/P Pulse Ox O2 Delivery O2 Flow Rate FiO2


 


2/2/17 05:51  108 22 100/59 94 Room Air  


 


2/2/17 03:16 98.1       








Orders





 Complete Blood Count With Diff (2/2/17 04:13)


Retic Count (2/2/17 04:13)


Sodium Chlor 0.9% 1000 Ml Inj (Ns 1000 M (2/2/17 04:15)


Hydromorphone Pf Inj (Dilaudid Pf Inj) (2/2/17 04:15)


Hydromorphone Pf Inj (Dilaudid Pf Inj) (2/2/17 06:00)


Sodium Chlor 0.9% 1000 Ml Inj (Ns 1000 M (2/2/17 06:00)


Folic Acid (Folate) (2/2/17 06:00)


Hydromorphone Pf Inj (Dilaudid Pf Inj) (2/2/17 06:15)


Red Blood Cells (Rbc) (2/2/17 06:05)


Blood Product Administration .UPON TRANSFUSION (2/2/17 06:05)


Sodium Chlor 0.9% 250 Ml Inj (Ns 250 Ml (2/2/17 06:15)


Type And Screen (2/2/17 06:05)


Admit To Inpatient (2/2/17 )


Vital Signs (Adult) Q4H (2/2/17 06:25)


Activity Oob With Assistance (2/2/17 06:25)


Cardiac Monitor / Telemetry .CONTINUOUS (2/2/17 06:25)


Sodium Chloride 0.9% Flush (Ns Flush) (2/2/17 06:30)


Sodium Chloride 0.9% Flush (Ns Flush) (2/2/17 09:00)


Ondansetron Inj (Zofran Inj) (2/2/17 06:30)


Basic Metabolic Panel (Bmp) (2/3/17 06:00)


Complete Blood Count With Diff (2/3/17 06:00)


Enoxaparin Inj (Lovenox Inj) (2/2/17 09:00)


Naloxone Inj (Narcan Inj) (2/2/17 06:30)


Inpatient Certification (2/2/17 )


Folic Acid (Folate) (2/2/17 09:00)


Hydroxyurea (Hydrea) (2/2/17 09:00)


Admit Order (Ed Use Only) (2/2/17 06:33)


Chest, Single Ap (2/2/17 )





Labs





 Laboratory Tests








Test 2/2/17





 04:20


 


White Blood Count 21.8 TH/MM3


 


Red Blood Count 2.96 MIL/MM3


 


Hemoglobin 7.8 GM/DL


 


Hematocrit 23.1 %


 


Mean Corpuscular Volume 78.0 FL


 


Mean Corpuscular Hemoglobin 26.3 PG


 


Mean Corpuscular Hemoglobin 33.7 %





Concent 


 


Red Cell Distribution Width 17.0 %


 


Platelet Count 225 TH/MM3


 


Mean Platelet Volume 8.7 FL


 


Neutrophils (%) (Auto) 68.1 %


 


Lymphocytes (%) (Auto) 24.8 %


 


Monocytes (%) (Auto) 5.2 %


 


Eosinophils (%) (Auto) 1.4 %


 


Basophils (%) (Auto) 0.5 %


 


Neutrophils # (Auto) 14.8 TH/MM3


 


Lymphocytes # (Auto) 5.4 TH/MM3


 


Monocytes # (Auto) 1.1 TH/MM3


 


Eosinophils # (Auto) 0.3 TH/MM3


 


Basophils # (Auto) 0.1 TH/MM3


 


CBC Comment AUTO DIFF 


 


Differential Total Cells 100 





Counted 


 


Neutrophils % (Manual) 71 %


 


Band Neutrophils % 2 %


 


Lymphocytes % 23 %


 


Monocytes % 3 %


 


Eosinophils % 1 %


 


Neutrophils # (Manual) 15.9 TH/MM3


 


Nucleated Red Blood Cells 2 /100 WBC


 


Differential Comment FINAL DIFF





 MANUAL


 


Platelet Estimate NORMAL 


 


Platelet Morphology Comment NORMAL 


 


Polychromasia 8.5 %


 


Sickle Cells 1+ 


 


Target Cells 1+ 


 


[place] Bodies PRESENT 


 


Acanthocytes OCC 


 


Keratocytes OCC 


 


Reticulocyte Count 9.3 %


 


Absolute Reticulocyte Count 273.8 MIL/L











MDM


Medical Decision Making


Medical Screen Exam Complete:  Yes


Emergency Medical Condition:  Yes


Medical Record Reviewed:  Yes


Differential Diagnosis


Painful crisis, chronic pain


Narrative Course


5:51 AM blood test results of back and patient has significant leukocytosis 

probably from nucleated RBCs.  His reticulocyte count is high.  Patient is 

getting pain management with morphine ordered by me.  However, he continues to 

be in severe distress. He says that he has been having difficulty breathing 

since the pain has gone up to his chest now. Hemoglobin is 7.6.  I will order 2 

units of PRBC for blood transfusion.  Patient will require admission.  I 

ordered a second liter of IV fluid bolus and by mouth folic acid.


Critical Care Narrative


Aggregate critical care time was 45 minutes. Time to perform other separately 

billable procedures was not  


included in the critical care time. My time did not include minutes spent 

treating any other patients simultaneously or on  


activities that did not directly contribute to the patient's treatment.  





The services I provided to this patient were to treat and/or prevent clinically 

significant deterioration that could result  


in: Sickle cell crisis, painful crisis, blood transfusion





I provided critical care services requiring my management, as noted below:


Chart data review, documentation time, medication orders and management, vital 

sign assessments/reviewing monitor data,  


ordering and reviewing lab tests, ordering and interpreting/reviewing x-rays 

and diagnostic studies, care of the patient  


and discussion of the patient with the admitting physicians.





Procedures


EKG Prior to Arrival:  No





Diagnosis





 Primary Impression:  


 Sickle cell pain crisis


 Additional Impressions:  


 Sickle cell disease with crisis


 Sickle cell anemia


 Qualified Code:  D57.00 - Hb-SS disease with crisis





Admitting Information


Admitting Physician Requests:  Admit


Scripts


Potassium Chloride ER 20 Meq Tab20 Meq PO DAILY  #7 TAB  Ref 0


   Prov:Kel Null DO         2/4/17 


Oxycodone 5 Mg Tab10 Mg PO Q4H PRN (pain) #45 TAB


   Prov:Kel Null DO         2/4/17








Sara Young MD Feb 2, 2017 04:15

## 2017-02-02 NOTE — HHI.HP
__________________________________________________





[facility]


Service


[facility]


Primary Care Physician


Deanna Glover MD


Admission Diagnosis


sickle cell painful crisis, blood transfusion


Diagnoses:  


Chief Complaint:  


Pain


Travel History


International Travel<30 Days:  No


Contact w/Intl Traveler <30 Da:  No


Traveled to Known Affected Are:  No





Sepsis Criteria


SIRS Criteria (2 or more):  Heart rate over 90, WBC > 48936, < 4000 or > 10% 

bands


Criteria Outcome:  Meets SIRS criteria


History of Present Illness


The patient is a 21-year-old male with a past medical history of sickle cell 

disease and G6PD deficiency who is presenting to the hospital with significant 

pain.  He says his symptoms started around 11 PM last night.  He developed pain 

in his arms, legs, chest and lower back.  He says he last went to his 

hematologist a couple of weeks ago but by the time he got there the physician 

had left the office.  He did get pain prescriptions written for but he was 

unable to fill them because he did not have a ride.  The patient says he has 

been out of pain medications for the past couple of weeks.  He has been trying 

to get by with over-the-counter Tylenol.  He denies any shortness of breath.  

He denies any fever.  He has no urinary symptoms.  He denies any diarrhea or 

constipation.  He received multiple doses of Dilaudid in the emergency 

department but still continues to have significant 10 out of 10 pain.  He 

thinks he needs a blood transfusion.  He wanted something to drink.





Review of Systems


Other


10 point review of systems completed and positive as per history of present 

illness.





Past Family Social History


Past Medical History


Sickle cell disease


G6PD deficiency


Allergies:  


Coded Allergies:  


     Sulfa (Verified  Allergy, Intermediate, 2/2/17)


     Morphine (Verified  Allergy, Unknown, 2/2/17)


Uncoded Allergies:  


     LEONARD BEANS (Allergy, Intermediate, 10/10/14)


Active Ordered Medications





 Current Medications








 Medications


  (Trade)  Dose


 Ordered  Sig/Maisha


 Route  Start Time


 Stop Time Status Last Admin


 


  ( ml Inj)  250 ml @ 


 15 mls/hr  ONCE  ONCE


 IV  2/2/17 06:15


 2/2/17 22:54   


 


 


  (NS Flush)  2 ml  UNSCH  PRN


 FLUSH  2/2/17 06:30


     


 


 


  (NS Flush)  2 ml  BID


 FLUSH  2/2/17 09:00


     


 


 


  (Zofran Inj)  4 mg  Q6H  PRN


 IVP  2/2/17 06:30


     


 


 


  (Lovenox Inj)  40 mg  Q24H


 SQ  2/2/17 09:00


     


 


 


  (Narcan Inj)  0.4 mg  UNSCH  PRN


 IV  2/2/17 06:30


     


 


 


  (Folate)  1 mg  DAILY


 PO  2/2/17 09:00


     


 


 


  (Hydrea)  500 mg  DAILY


 PO  2/2/17 09:00


     


 


 


  (Dilaudid Pf Inj)  2 mg  Q3HR  PRN


 IV PUSH  2/2/17 08:15


   UNV  


 


 


  (Roxicodone)  5 mg  Q4H  PRN


 PO  2/2/17 08:15


   UNV  


 


 


  (Roxicodone)  10 mg  Q4H  PRN


 PO  2/2/17 08:15


   UNV  


 








Family History


Sickle cell disease


Social History


The pt uses [employer] occasionally.





Physical Exam


Vital Signs





 Vital Signs








  Date Time  Temp Pulse Resp B/P Pulse Ox O2 Delivery O2 Flow Rate FiO2


 


2/2/17 06:39  105 22 138/98 100 Room Air  


 


2/2/17 05:51  108 22 100/59 94 Room Air  


 


2/2/17 05:26  110 22 91/54 95 Room Air  


 


2/2/17 03:29  114   97 Room Air  


 


2/2/17 03:24   24     


 


2/2/17 03:20  101 20 116/56 97 Room Air  


 


2/2/17 03:16 98.1 115 18 119/66 100 Room Air  








Physical Exam


GENERAL: This is a well-nourished, well-developed patient, in distress from 

pain.


SKIN: No rashes, ecchymoses or lesions. Cool and dry.


HEAD: Atraumatic. Normocephalic. No temporal or scalp tenderness.


EYES: Pupils equal round and reactive. Extraocular motions intact. No scleral 

icterus. No injection or drainage. 


ENT: Nose without bleeding, purulent drainage or septal hematoma. Throat 

without erythema, tonsillar hypertrophy or exudate. Uvula midline. Airway 

patent.


NECK: Trachea midline. No JVD or lymphadenopathy. Supple, nontender, no 

meningeal signs.


CARDIOVASCULAR: Tachycardic without murmurs, gallops, or rubs. 


RESPIRATORY: Clear to auscultation. Breath sounds equal bilaterally. No wheezes

, rales, or rhonchi.  


GASTROINTESTINAL: Abdomen soft, non-tender, nondistended. No hepato-splenomegaly

, or palpable masses. No guarding.


MUSCULOSKELETAL: Extremities without clubbing, cyanosis, or edema. Tenderness 

to palpation of lower back. 


NEUROLOGICAL: Awake and alert. Cranial nerves II through XII intact.  Motor and 

sensory grossly within normal limits. Five out of 5 muscle strength in all 

muscle groups.  Normal speech.


PSYCH: Anxious.


Laboratory





Laboratory Tests








Test 2/2/17 2/2/17





 04:20 06:52


 


White Blood Count 21.8  


 


Red Blood Count 2.96  


 


Hemoglobin 7.8  


 


Hematocrit 23.1  


 


Mean Corpuscular Volume 78.0  


 


Mean Corpuscular Hemoglobin 26.3  


 


Mean Corpuscular Hemoglobin 33.7  





Concent  


 


Red Cell Distribution Width 17.0  


 


Platelet Count 225  


 


Mean Platelet Volume 8.7  


 


Neutrophils (%) (Auto) 68.1  


 


Lymphocytes (%) (Auto) 24.8  


 


Monocytes (%) (Auto) 5.2  


 


Eosinophils (%) (Auto) 1.4  


 


Basophils (%) (Auto) 0.5  


 


Neutrophils # (Auto) 14.8  


 


Lymphocytes # (Auto) 5.4  


 


Monocytes # (Auto) 1.1  


 


Eosinophils # (Auto) 0.3  


 


Basophils # (Auto) 0.1  


 


CBC Comment AUTO DIFF  


 


Differential Total Cells 100  





Counted  


 


Neutrophils % (Manual) 71  


 


Band Neutrophils % 2  


 


Lymphocytes % 23  


 


Monocytes % 3  


 


Eosinophils % 1  


 


Neutrophils # (Manual) 15.9  


 


Nucleated Red Blood Cells 2  


 


Differential Comment FINAL DIFF 





 MANUAL 


 


Platelet Estimate NORMAL  


 


Platelet Morphology Comment NORMAL  


 


Polychromasia 8.5  


 


Sickle Cells 1+  


 


Target Cells 1+  


 


[place] Bodies PRESENT  


 


Acanthocytes OCC  


 


Keratocytes OCC  


 


Reticulocyte Count 9.3  


 


Absolute Reticulocyte Count 273.8  


 


Blood Type  AB POSITIVE 


 


Antibody Screen  NEGATIVE 








Result Diagram:  


2/2/17 0420





Imaging





Last Impressions








Chest X-Ray 2/2/17 0000 Signed





Impressions: 





 Service Date/Time:  Thursday, February 2, 2017 06:43 - CONCLUSION:  1. 





 Cardiomegaly. Minimal basilar atelectasis.     Matteo Abdul MD 











Assessment and Plan


Assessment and Plan


Sickle cell crisis


The pt has significant pain in his legs, arms, lower back and chest. Hemoglobin 

is stable.


- pain control with PO and IV pain meds along with a bowel regimen.


- hematology consult pending.


- IVFs.


- blood transfusion ordered by ER doc. 


- Check LFTs.





Chest pain


Secondary to sickle cell crisis.


- Pain control as above.


- Trend troponins.


- Check an EKG.





Leukocytosis/ SIRS


WBC 21.8 on admission.  The patient is also tachycardic.  May be a stress 

reaction. CXR unremarkable. Afebrile.


- check blood cultures, UA.


- follow CBC.


- IVFs.





PPx: SCDs.


Code Status


Full.


Discussed Condition With


Pt, pt's nurse.





Physician Certification


2 Midnight Certification Type:  Admission for Inpatient Services


Order for Inpatient Services


The services are ordered in accordance with Medicare regulations or non-

Medicare payer requirements, as applicable.  In the case of services not 

specified as inpatient-only, they are appropriately provided as inpatient 

services in accordance with the 2-midnight benchmark.


Estimated LOS (days):  2


 days is the estimated time the patient will need to remain in the hospital, 

assuming treatment plan goals are met and no additional complications.


Post-Hospital Plan:  Home








Kel Null DO Feb 2, 2017 09:22

## 2017-02-03 VITALS
RESPIRATION RATE: 19 BRPM | SYSTOLIC BLOOD PRESSURE: 121 MMHG | HEART RATE: 98 BPM | DIASTOLIC BLOOD PRESSURE: 63 MMHG | TEMPERATURE: 98.2 F | OXYGEN SATURATION: 95 %

## 2017-02-03 VITALS
TEMPERATURE: 96.7 F | DIASTOLIC BLOOD PRESSURE: 80 MMHG | OXYGEN SATURATION: 98 % | RESPIRATION RATE: 16 BRPM | HEART RATE: 77 BPM | SYSTOLIC BLOOD PRESSURE: 132 MMHG

## 2017-02-03 VITALS
RESPIRATION RATE: 16 BRPM | SYSTOLIC BLOOD PRESSURE: 122 MMHG | HEART RATE: 71 BPM | TEMPERATURE: 96.8 F | OXYGEN SATURATION: 98 % | DIASTOLIC BLOOD PRESSURE: 69 MMHG

## 2017-02-03 VITALS
RESPIRATION RATE: 16 BRPM | TEMPERATURE: 96.6 F | OXYGEN SATURATION: 98 % | SYSTOLIC BLOOD PRESSURE: 130 MMHG | HEART RATE: 80 BPM | DIASTOLIC BLOOD PRESSURE: 81 MMHG

## 2017-02-03 VITALS
SYSTOLIC BLOOD PRESSURE: 127 MMHG | RESPIRATION RATE: 19 BRPM | TEMPERATURE: 97 F | DIASTOLIC BLOOD PRESSURE: 65 MMHG | OXYGEN SATURATION: 94 % | HEART RATE: 127 BPM

## 2017-02-03 VITALS
RESPIRATION RATE: 16 BRPM | HEART RATE: 75 BPM | DIASTOLIC BLOOD PRESSURE: 69 MMHG | TEMPERATURE: 96.8 F | OXYGEN SATURATION: 98 % | SYSTOLIC BLOOD PRESSURE: 128 MMHG

## 2017-02-03 VITALS
OXYGEN SATURATION: 95 % | TEMPERATURE: 96.9 F | RESPIRATION RATE: 16 BRPM | DIASTOLIC BLOOD PRESSURE: 76 MMHG | SYSTOLIC BLOOD PRESSURE: 127 MMHG | HEART RATE: 70 BPM

## 2017-02-03 LAB
ALP SERPL-CCNC: 74 U/L (ref 45–117)
ALT SERPL-CCNC: 27 U/L (ref 12–78)
ANION GAP SERPL CALC-SCNC: 8 MEQ/L (ref 5–15)
AST SERPL-CCNC: 40 U/L (ref 15–37)
BASOPHILS # BLD AUTO: 0.1 TH/MM3 (ref 0–0.2)
BASOPHILS NFR BLD: 0.5 % (ref 0–2)
BILIRUB INDIRECT SERPL-MCNC: 4.1 MG/DL (ref 0–0.8)
BILIRUB SERPL-MCNC: 4.3 MG/DL (ref 0.2–1)
BUN SERPL-MCNC: 6 MG/DL (ref 7–18)
CHLORIDE SERPL-SCNC: 104 MEQ/L (ref 98–107)
EOSINOPHIL # BLD: 0.6 TH/MM3 (ref 0–0.4)
EOSINOPHIL NFR BLD: 1 % (ref 0–4)
EOSINOPHIL NFR BLD: 4.1 % (ref 0–4)
ERYTHROCYTE [DISTWIDTH] IN BLOOD BY AUTOMATED COUNT: 17.5 % (ref 11.6–17.2)
GFR SERPLBLD BASED ON 1.73 SQ M-ARVRAT: 250 ML/MIN (ref 89–?)
HCO3 BLD-SCNC: 25.4 MEQ/L (ref 21–32)
HCT VFR BLD CALC: 29 % (ref 39–51)
HEMO FLAGS: (no result)
HGB S BLD QL SOLY: (no result)
HOWELL-JOLLY BOD BLD QL SMEAR: PRESENT
LYMPHOCYTES # BLD AUTO: 3.1 TH/MM3 (ref 1–4.8)
LYMPHOCYTES NFR BLD AUTO: 22.8 % (ref 9–44)
MCH RBC QN AUTO: 26 PG (ref 27–34)
MCHC RBC AUTO-ENTMCNC: 33.8 % (ref 32–36)
MCV RBC AUTO: 76.7 FL (ref 80–100)
MONOCYTES NFR BLD: 7.1 % (ref 0–8)
NEUTROPHILS # BLD AUTO: 8.8 TH/MM3 (ref 1.8–7.7)
NEUTROPHILS NFR BLD AUTO: 65.5 % (ref 16–70)
NEUTS BAND # BLD MANUAL: 9.3 TH/MM3 (ref 1.8–7.7)
NEUTS BAND NFR BLD: 1 % (ref 0–6)
NEUTS SEG NFR BLD MANUAL: 68 % (ref 16–70)
PLAT MORPH BLD: NORMAL
PLATELET # BLD: 254 TH/MM3 (ref 150–450)
PLATELET BLD QL SMEAR: NORMAL
POLYCHROMASIA BLD QL SMEAR: 3.7 % (ref 0–1.9)
POTASSIUM SERPL-SCNC: 3.4 MEQ/L (ref 3.5–5.1)
RBC # BLD AUTO: 3.78 MIL/MM3 (ref 4.5–5.9)
SCAN/DIFF: (no result)
SODIUM SERPL-SCNC: 137 MEQ/L (ref 136–145)
TARGETS BLD QL SMEAR: (no result)
WBC # BLD AUTO: 13.5 TH/MM3 (ref 4–11)
WBC DIFF SAMPLE: 100
WBC NRBC COR # BLD: 8 /100 WBC (ref 0–0)

## 2017-02-03 RX ADMIN — HYDROMORPHONE HYDROCHLORIDE PRN MG: 4 INJECTION, SOLUTION INTRAMUSCULAR; INTRAVENOUS; SUBCUTANEOUS at 14:58

## 2017-02-03 RX ADMIN — DOCUSATE SODIUM SCH MG: 100 CAPSULE, LIQUID FILLED ORAL at 09:40

## 2017-02-03 RX ADMIN — DOCUSATE SODIUM SCH MG: 100 CAPSULE, LIQUID FILLED ORAL at 19:17

## 2017-02-03 RX ADMIN — ENOXAPARIN SODIUM SCH MG: 40 INJECTION SUBCUTANEOUS at 09:39

## 2017-02-03 RX ADMIN — PHENYTOIN SODIUM SCH MLS/HR: 50 INJECTION INTRAMUSCULAR; INTRAVENOUS at 16:00

## 2017-02-03 RX ADMIN — SODIUM CHLORIDE, PRESERVATIVE FREE SCH ML: 5 INJECTION INTRAVENOUS at 09:00

## 2017-02-03 RX ADMIN — HYDROMORPHONE HYDROCHLORIDE PRN MG: 4 INJECTION, SOLUTION INTRAMUSCULAR; INTRAVENOUS; SUBCUTANEOUS at 19:17

## 2017-02-03 RX ADMIN — SENNOSIDES SCH MG: 8.6 TABLET, FILM COATED ORAL at 09:00

## 2017-02-03 RX ADMIN — HYDROMORPHONE HYDROCHLORIDE PRN MG: 4 INJECTION, SOLUTION INTRAMUSCULAR; INTRAVENOUS; SUBCUTANEOUS at 10:31

## 2017-02-03 RX ADMIN — HYDROXYUREA SCH MG: 500 CAPSULE ORAL at 09:42

## 2017-02-03 RX ADMIN — FOLIC ACID SCH MG: 1 TABLET ORAL at 09:00

## 2017-02-03 RX ADMIN — SODIUM CHLORIDE, PRESERVATIVE FREE SCH ML: 5 INJECTION INTRAVENOUS at 19:17

## 2017-02-03 RX ADMIN — HYDROMORPHONE HYDROCHLORIDE PRN MG: 4 INJECTION, SOLUTION INTRAMUSCULAR; INTRAVENOUS; SUBCUTANEOUS at 01:23

## 2017-02-03 RX ADMIN — HYDROMORPHONE HYDROCHLORIDE PRN MG: 4 INJECTION, SOLUTION INTRAMUSCULAR; INTRAVENOUS; SUBCUTANEOUS at 23:06

## 2017-02-03 RX ADMIN — HYDROMORPHONE HYDROCHLORIDE PRN MG: 4 INJECTION, SOLUTION INTRAMUSCULAR; INTRAVENOUS; SUBCUTANEOUS at 05:33

## 2017-02-03 NOTE — EKG
Date Performed: 02/02/2017       Time Performed: 17:16:40

 

PTAGE:      21 years

 

EKG:      Sinus rhythm 

 

 WITH FIRST DEGREE AV BLOCK ABNORMAL ECG

 

PREVIOUS TRACING       : 11/08/2016 04.58 Since previous tracing, no significant change noted

 

DOCTOR:   Irene Salcido  Interpretating Date/Time  02/03/2017 13:42:22

## 2017-02-03 NOTE — HHI.PR
Subjective


Remarks


The pt continued to complain of the same pain. He said he is getting tired of 

being in pain. He plans on graduating in May and his goal is to go to law 

school. He has been ambulating. He talked with his hematologist earlier.





Objective


Vitals





 Vital Signs








  Date Time  Temp Pulse Resp B/P Pulse Ox O2 Delivery O2 Flow Rate FiO2


 


2/3/17 12:00 96.8 71 16 122/69 98   


 


2/3/17 10:15 96.8 75 16 128/69 98   


 


2/3/17 08:00 96.6 80 16 130/81 98   


 


2/3/17 06:31   20     


 


2/3/17 06:31   20     


 


2/3/17 04:00 98.2 98 19 121/63 95   


 


2/3/17 00:00 97.0 127 19 127/65 94   


 


2/2/17 21:00  82      


 


2/2/17 20:00 97.0 127 19 130/68 94   


 


2/2/17 18:50 97.1 72 16 127/75 99   


 


2/2/17 17:12 98.5 80 15 126/83 94 Room Air  


 


2/2/17 16:36 98.6 89 16 144/80 95 Room Air  








 I/O








 2/2/17 2/2/17 2/2/17 2/3/17 2/3/17 2/3/17





 07:00 15:00 23:00 07:00 15:00 23:00


 


Intake Total   980 ml 480 ml 480 ml 


 


Balance   980 ml 480 ml 480 ml 


 


      


 


Intake Oral   480 ml 480 ml 480 ml 


 


Packed Cells   500 ml   


 


# Voids   2 1  


 


# Bowel Movements   0 0  








Result Diagram:  


2/3/17 0533                                                                    

            2/3/17 0533





Imaging





Last Impressions








Chest X-Ray 2/2/17 0000 Signed





Impressions: 





 Service Date/Time:  Thursday, February 2, 2017 06:43 - CONCLUSION:  1. 





 Cardiomegaly. Minimal basilar atelectasis.     Matteo Abdul MD 








Objective Remarks


GENERAL: This is a well-nourished, well-developed patient, in some distress 

from pain.


SKIN: No rashes, ecchymoses or lesions. Cool and dry.


HEAD: Atraumatic. Normocephalic. No temporal or scalp tenderness.


EYES: Pupils equal round and reactive. Extraocular motions intact. No scleral 

icterus. No injection or drainage. 


ENT: Nose without bleeding, purulent drainage or septal hematoma. Throat 

without erythema, tonsillar hypertrophy or exudate. Uvula midline. Airway 

patent.


NECK: Trachea midline. No JVD or lymphadenopathy. Supple, nontender, no 

meningeal signs.


CARDIOVASCULAR: Regular rate and rhythm without murmurs, gallops, or rubs. 


RESPIRATORY: Clear to auscultation. Breath sounds equal bilaterally. No wheezes

, rales, or rhonchi.  


GASTROINTESTINAL: Abdomen soft, non-tender, nondistended. No hepato-splenomegaly

, or palpable masses. No guarding.


MUSCULOSKELETAL: Extremities without clubbing, cyanosis, or edema. Tenderness 

to palpation of lower back. 


NEUROLOGICAL: Awake and alert. Cranial nerves II through XII intact.  Motor and 

sensory grossly within normal limits. Five out of 5 muscle strength in all 

muscle groups.  Normal speech.


PSYCH: Anxious.


Medications and IVs





 Current Medications








 Medications


  (Trade)  Dose


 Ordered  Sig/Maisha


 Route  Start Time


 Stop Time Status Last Admin


 


  (NS Flush)  2 ml  UNSCH  PRN


 FLUSH  2/2/17 06:30


     


 


 


  (NS Flush)  2 ml  BID


 FLUSH  2/2/17 09:00


    2/3/17 09:00


 


 


  (Zofran Inj)  4 mg  Q6H  PRN


 IVP  2/2/17 06:30


     


 


 


  (Lovenox Inj)  40 mg  Q24H


 SQ  2/2/17 09:00


    2/2/17 11:12


 


 


  (Narcan Inj)  0.4 mg  UNSCH  PRN


 IV  2/2/17 06:30


     


 


 


  (Folate)  1 mg  DAILY


 PO  2/2/17 09:00


    2/3/17 09:00


 


 


  (Hydrea)  500 mg  DAILY


 PO  2/2/17 09:00


    2/3/17 09:42


 


 


  (Roxicodone)  5 mg  Q4H  PRN


 PO  2/2/17 08:15


     


 


 


  (Roxicodone)  10 mg  Q4H  PRN


 PO  2/2/17 08:15


    2/3/17 14:04


 


 


  (Dilaudid Pf Inj)  3 mg  Q4HR  PRN


 IV PUSH  2/2/17 12:00


    2/3/17 14:58


 


 


  (Colace)  100 mg  BID


 PO  2/2/17 09:30


    2/3/17 09:40


 


 


 Sennosides 17.2 mg  17.2 mg  DAILY


 PO  2/2/17 09:30


    2/3/17 09:00


 


 


  (NS 1000 ml Inj)  1,000 ml @ 


 100 mls/hr  Q10H


 IV  2/3/17 16:00


     


 


 


  (K-Lyte Cl  Eff)  50 meq  ONCE  ONCE


 PO  2/3/17 16:00


 2/3/17 16:01   


 











A/P


Assessment and Plan


Sickle cell crisis


The pt has significant pain in his legs, arms, lower back and chest. Hemoglobin 

is stable following transfusion. Appreciate hematology consult.


- pain control with PO and IV pain meds along with a bowel regimen.


- IVFs.


- trend LFTs.


- follow up with hematology.





Chest pain


Secondary to sickle cell crisis. Trops negative. EKG with NSR, 1st degree AVB.


- Pain control as above.


- oxygen as needed.





Leukocytosis/ SIRS


WBC 21.8 on admission.  The patient was also tachycardic.  May be a stress 

reaction. CXR unremarkable. Afebrile. Improved 2/3.


- check blood cultures, UA.


- follow CBC.


- IVFs.





PPx: SCDs.


Discharge Planning


Awaiting clinical improvement.








Kel Null DO Feb 3, 2017 15:36

## 2017-02-03 NOTE — PD.ONC.PN
Subjective


Subjective


Remarks


Afebrile overnight. Pt sitting in chair at bedside talking on the phone. He c/o 

pain in his back and legs. He has no SOB, minimal chest pain.





Objective


Data











  Date Time  Temp Pulse Resp B/P Pulse Ox O2 Delivery O2 Flow Rate FiO2


 


2/3/17 12:00 96.8 71 16 122/69 98   


 


2/3/17 10:15 96.8 75 16 128/69 98   


 


2/3/17 08:00 96.6 80 16 130/81 98   


 


2/3/17 06:31   20     


 


2/3/17 06:31   20     


 


2/3/17 04:00 98.2 98 19 121/63 95   


 


2/3/17 00:00 97.0 127 19 127/65 94   


 


2/2/17 21:00  82      


 


2/2/17 20:00 97.0 127 19 130/68 94   


 


2/2/17 18:50 97.1 72 16 127/75 99   


 


2/2/17 17:12 98.5 80 15 126/83 94 Room Air  


 


2/2/17 16:36 98.6 89 16 144/80 95 Room Air  


 


2/2/17 15:03 98.4 96 16 134/80 99 Room Air  














 2/3/17 2/3/17 2/3/17





 07:00 15:00 23:00


 


Intake Total 480 ml 480 ml 


 


Balance 480 ml 480 ml 








Result Diagram:  


2/3/17 0533                                                                    

            2/3/17 0533





Laboratory Results





Laboratory Tests








Test 2/3/17





 05:33


 


White Blood Count 13.5 TH/MM3


 


Red Blood Count 3.78 MIL/MM3


 


Hemoglobin 9.8 GM/DL


 


Hematocrit 29.0 %


 


Mean Corpuscular Volume 76.7 FL


 


Mean Corpuscular Hemoglobin 26.0 PG


 


Mean Corpuscular Hemoglobin 33.8 %





Concent 


 


Red Cell Distribution Width 17.5 %


 


Platelet Count 254 TH/MM3


 


Mean Platelet Volume 8.5 FL


 


Neutrophils (%) (Auto) 65.5 %


 


Lymphocytes (%) (Auto) 22.8 %


 


Monocytes (%) (Auto) 7.1 %


 


Eosinophils (%) (Auto) 4.1 %


 


Basophils (%) (Auto) 0.5 %


 


Neutrophils # (Auto) 8.8 TH/MM3


 


Lymphocytes # (Auto) 3.1 TH/MM3


 


Monocytes # (Auto) 1.0 TH/MM3


 


Eosinophils # (Auto) 0.6 TH/MM3


 


Basophils # (Auto) 0.1 TH/MM3


 


CBC Comment AUTO DIFF 


 


Differential Total Cells 100 





Counted 


 


Neutrophils % (Manual) 68 %


 


Band Neutrophils % 1 %


 


Lymphocytes % 26 %


 


Monocytes % 4 %


 


Eosinophils % 1 %


 


Neutrophils # (Manual) 9.3 TH/MM3


 


Nucleated Red Blood Cells 8 /100 WBC


 


Differential Comment FINAL DIFF





 MANUAL


 


Platelet Estimate NORMAL 


 


Platelet Morphology Comment NORMAL 


 


Polychromasia 3.7 %


 


Sickle Cells 1+ 


 


Target Cells 1+ 


 


[place] Bodies PRESENT 


 


Red Cell Morphology Comment  


 


Sodium Level 137 MEQ/L


 


Potassium Level 3.4 MEQ/L


 


Chloride Level 104 MEQ/L


 


Carbon Dioxide Level 25.4 MEQ/L


 


Anion Gap 8 MEQ/L


 


Blood Urea Nitrogen 6 MG/DL


 


Creatinine 0.50 MG/DL


 


Estimat Glomerular Filtration 250 ML/MIN





Rate 


 


Random Glucose 97 MG/DL


 


Calcium Level 9.2 MG/DL


 


Total Bilirubin 4.3 MG/DL


 


Direct Bilirubin 0.2 MG/DL


 


Indirect Bilirubin 4.1 MG/DL


 


Aspartate Amino Transf 40 U/L





(AST/SGOT) 


 


Alanine Aminotransferase 27 U/L





(ALT/SGPT) 


 


Alkaline Phosphatase 74 U/L


 


Troponin I LESS THAN 0.02





 NG/ML


 


Total Protein 7.6 GM/DL


 


Albumin 4.8 GM/DL








Culture Results





Microbiology








 Date/Time Procedure Status





Source Growth 


 


 2/2/17 10:15 Aerobic Blood Culture - Preliminary Resulted





Blood Peripheral NO GROWTH IN 1 DAY 





 2/2/17 10:15 Anaerobic Blood Culture - Preliminary Resulted





Blood Peripheral NO GROWTH IN 1 DAY 


 


 2/2/17 10:20 Aerobic Blood Culture - Preliminary Resulted





Blood Peripheral NO GROWTH IN 1 DAY 





 2/2/17 10:20 Anaerobic Blood Culture - Preliminary Resulted





Blood Peripheral NO GROWTH IN 1 DAY 








Imaging Studies





Last Impressions








Chest X-Ray 2/2/17 0000 Signed





Impressions: 





 Service Date/Time:  Thursday, February 2, 2017 06:43 - CONCLUSION:  1. 





 Cardiomegaly. Minimal basilar atelectasis.     Matteo Abdul MD 











Administered Medications








 Medications


  (Trade)  Dose


 Ordered  Sig/Maisha


 Route


 PRN Reason  Start Time


 Stop Time Status Last Admin


Dose Admin


 


 IV Flush


  (NS Flush)  2 ml  BID


 FLUSH


   2/2/17 09:00


    2/3/17 09:00


 


 


 Enoxaparin Sodium


  (Lovenox Inj)  40 mg  Q24H


 SQ


   2/2/17 09:00


    2/2/17 11:12


 


 


 Folic Acid


  (Folate)  1 mg  DAILY


 PO


   2/2/17 09:00


    2/3/17 09:00


 


 


 Hydroxyurea


  (Hydrea)  500 mg  DAILY


 PO


   2/2/17 09:00


    2/3/17 09:42


 


 


 Oxycodone HCl


  (Roxicodone)  10 mg  Q4H  PRN


 PO


 pain 6-10  2/2/17 08:15


    2/3/17 14:04


 


 


 Hydromorphone HCl


  (Dilaudid Pf Inj)  3 mg  Q4HR  PRN


 IV PUSH


 Breakthrough pain  2/2/17 12:00


    2/3/17 10:31


 


 


 Docusate Sodium


  (Colace)  100 mg  BID


 PO


   2/2/17 09:30


    2/3/17 09:40


 


 


 Sennosides


  (Senokot)  17.2 mg  DAILY


 PO


   2/2/17 09:30


    2/3/17 09:00


 








Objective Remarks


GENERAL: Younger male, sitting up in chair at bedside in no distress. 


SKIN: Warm and dry.


HEAD: Normocephalic.


EYES: No injection or drainage. 


NECK: Supple, trachea midline. 


CARDIOVASCULAR: +S1/S2. No murmur appreciated.


RESPIRATORY: Breath sounds equal bilaterally. No accessory muscle use.


GASTROINTESTINAL: Abdomen soft, non-tender, nondistended. 


EXTREMITIES: No edema. 


NEUROLOGICAL: A&Ox3. Moving all extremities. Normal speech.





Assessment/Plan


Assessment


20 y/o male admitted for sickle cell crisis. 


2/3/17: He received 2L NS since admission. Continue Dilaudid 3mg IV Q4H. No 

transfusion. We will continue to monitor him, give supportive care.


Attending Statement


pain is better with dilaudid 3 mg q 4hrs


No acute chest syndrome.


continue present plan.


The exam, history, and the medical decision-making described in the above note 

were completed with the assistance of the mid-level provider. I reviewed and 

agree with the findings presented.  I attest that I had a face-to-face 

encounter with the patient on the same day, and personally performed and 

documented my assessment and findings in the medical record.








Marie Miranda Feb 3, 2017 14:58


Deanna Glover MD Feb 3, 2017 17:41

## 2017-02-03 NOTE — MB
cc:

HERLINDA GLOVER M.D.

****

 

 

DATE OF CONSULTATION:  02/02/2017.

 

REASON FOR CONSULTATION:

Consult requested by [facility] for evaluation of sickle cell painful crisis.

 

HISTORY OF PRESENT ILLNESS:

Elton is a very pleasant 21-year-old male.  He is well-known to me.  He has a

history of sickle-cell disease with multiple painful crises. The patient ran

out of his pain medications.  He had called our office and the prescription was

supposed to be picked up; however, he did not have any transportation available

and he did not  the prescription. The patient decided to come to the

emergency room with pain.  He is complaining of back pain and leg pain which

started the night before. He denies any chest pain. He denies any shortness of

breath or chest pain. No evidence of acute chest syndrome noted. He denies any

bleeding. The rest of the review of systems is negative.

 

PAST MEDICAL HISTORY:

Sickle-cell disease with multiple painful crises G6PD deficiency.

 

ALLERGIES:

1. SULFA.

2. MORPHINE.

3. Kristie beans.

 

MEDICATIONS:

1. Hydrea 500 milligrams daily.

2. Folate 1 milligrams daily.

3. Narcan.

4. Lovenox.

5. Zofran.

6. Dilaudid.

 

FAMILY HISTORY:

His family history is significant for sickle-cell disease.

 

SOCIAL HISTORY:

The patient does not smoke cigarettes and does not drink alcohol. He is a

student at [employer].

 

 

PHYSICAL EXAMINATION:

GENERAL:  He is a well-developed, well-nourished -American male in mild

to moderate distress due to pain.

VITAL SIGNS:  Temperature 98.5, heart rate is 80, blood pressure 126/83, 02

saturation 94% on room air.

HEAD, EYES, EARS, NOSE, THROAT:  Pupils equal, round and reactive to light and

accommodation. Extraocular muscles intact. Sclera is icteric. No oral lesions 
are

noted.

NECK: The neck is supple.

LYMPHATIC: There is no cervical, supraclavicular, axillary lymphadenopathy

noted.

LUNGS: Clear. No wheezes, rales or rhonchi.

HEART: Regular rate and rhythm.

ABDOMEN: Abdomen soft and nontender. No hepatosplenomegaly.

EXTREMITIES: No pedal edema.

NEUROLOGIC: Awake, alert and oriented times three.

SKIN: No significant lesions are noted.

 

ASSESSMENT:

1. Sickle-cell disease, now he has painful crisis.

2. Symptomatic anemia.

3. Leukocytosis with no evidence of infection.

4. G6PD deficiency.

 

PLAN:

The patient had a CBC today which showed white count 21.8, hemoglobin 7.8,

hematocrit 23.1, platelet count is 225,000.

 

The patient has symptomatic anemia and he is going to get two units of blood

transfusion today. His bilirubin is elevated at 3.6. AST is high at 38.

Creatinine is normal.

 

The patient has leukocytosis but he does not have any fever.

 

The blood cultures have been done and those are still pending.

 

He had a chest x-ray which shows cardiomegaly with minimal basilar

atelectasis.

 

The patient is receiving narcotics, hydration, Hydrea and folic acid. The

patient does not like oxygen through the nasal cannula; he does not think that

helps him.

 

Further recommendations base on his hospital stay.

 

Thank you for asking my opinion.

 

 

 

 

 

                              _________________________________

                              Herlinda Glover MD

 

 

 

AS/ALEJO

D:  2/3/2017/5:48 PM

T:  2/3/2017/5:58 PM

Visit #:  [id number]

Job #:  [id number]

JENIFER

## 2017-02-04 VITALS
DIASTOLIC BLOOD PRESSURE: 76 MMHG | TEMPERATURE: 97.1 F | OXYGEN SATURATION: 98 % | HEART RATE: 72 BPM | RESPIRATION RATE: 20 BRPM | SYSTOLIC BLOOD PRESSURE: 136 MMHG

## 2017-02-04 VITALS
RESPIRATION RATE: 17 BRPM | DIASTOLIC BLOOD PRESSURE: 67 MMHG | TEMPERATURE: 97 F | HEART RATE: 88 BPM | SYSTOLIC BLOOD PRESSURE: 111 MMHG | OXYGEN SATURATION: 99 %

## 2017-02-04 VITALS
DIASTOLIC BLOOD PRESSURE: 66 MMHG | OXYGEN SATURATION: 96 % | HEART RATE: 81 BPM | SYSTOLIC BLOOD PRESSURE: 134 MMHG | RESPIRATION RATE: 17 BRPM | TEMPERATURE: 96.2 F

## 2017-02-04 VITALS
OXYGEN SATURATION: 98 % | RESPIRATION RATE: 20 BRPM | HEART RATE: 68 BPM | TEMPERATURE: 97.2 F | SYSTOLIC BLOOD PRESSURE: 144 MMHG | DIASTOLIC BLOOD PRESSURE: 74 MMHG

## 2017-02-04 LAB
ALP SERPL-CCNC: 71 U/L (ref 45–117)
ALT SERPL-CCNC: 26 U/L (ref 12–78)
ANION GAP SERPL CALC-SCNC: 10 MEQ/L (ref 5–15)
AST SERPL-CCNC: 48 U/L (ref 15–37)
BASOPHILS # BLD AUTO: 0.1 TH/MM3 (ref 0–0.2)
BASOPHILS NFR BLD: 0.5 % (ref 0–2)
BILIRUB SERPL-MCNC: 5.4 MG/DL (ref 0.2–1)
BUN SERPL-MCNC: 5 MG/DL (ref 7–18)
CHLORIDE SERPL-SCNC: 104 MEQ/L (ref 98–107)
EOSINOPHIL # BLD: 0.6 TH/MM3 (ref 0–0.4)
EOSINOPHIL NFR BLD: 4 % (ref 0–4)
EOSINOPHIL NFR BLD: 5.3 % (ref 0–4)
ERYTHROCYTE [DISTWIDTH] IN BLOOD BY AUTOMATED COUNT: 18.9 % (ref 11.6–17.2)
FERRITIN SERPL-MCNC: 1976 NG/ML (ref 26–388)
GFR SERPLBLD BASED ON 1.73 SQ M-ARVRAT: 215 ML/MIN (ref 89–?)
HCO3 BLD-SCNC: 23.1 MEQ/L (ref 21–32)
HCT VFR BLD CALC: 28.7 % (ref 39–51)
HEMO FLAGS: (no result)
HGB S BLD QL SOLY: (no result)
LYMPHOCYTES # BLD AUTO: 2.5 TH/MM3 (ref 1–4.8)
LYMPHOCYTES NFR BLD AUTO: 22.7 % (ref 9–44)
MCH RBC QN AUTO: 26.4 PG (ref 27–34)
MCHC RBC AUTO-ENTMCNC: 33.4 % (ref 32–36)
MCV RBC AUTO: 79.1 FL (ref 80–100)
MONOCYTES NFR BLD: 6 % (ref 0–8)
MYELOCYTES NFR BLD: 1 % (ref 0–0)
NEUTROPHILS # BLD AUTO: 7.3 TH/MM3 (ref 1.8–7.7)
NEUTROPHILS NFR BLD AUTO: 65.5 % (ref 16–70)
NEUTS BAND # BLD MANUAL: 6.4 TH/MM3 (ref 1.8–7.7)
NEUTS BAND NFR BLD: 2 % (ref 0–6)
NEUTS SEG NFR BLD MANUAL: 54 % (ref 16–70)
PLAT MORPH BLD: NORMAL
PLATELET # BLD: 225 TH/MM3 (ref 150–450)
PLATELET BLD QL SMEAR: NORMAL
POLYCHROMASIA BLD QL SMEAR: 2.2 % (ref 0–1.9)
POTASSIUM SERPL-SCNC: 4 MEQ/L (ref 3.5–5.1)
RBC # BLD AUTO: 3.63 MIL/MM3 (ref 4.5–5.9)
SCAN/DIFF: (no result)
SODIUM SERPL-SCNC: 137 MEQ/L (ref 136–145)
TARGETS BLD QL SMEAR: (no result)
WBC # BLD AUTO: 11.2 TH/MM3 (ref 4–11)
WBC DIFF SAMPLE: 100
WBC NRBC COR # BLD: 6 /100 WBC (ref 0–0)

## 2017-02-04 RX ADMIN — SENNOSIDES SCH MG: 8.6 TABLET, FILM COATED ORAL at 09:38

## 2017-02-04 RX ADMIN — HYDROXYUREA SCH MG: 500 CAPSULE ORAL at 09:39

## 2017-02-04 RX ADMIN — ENOXAPARIN SODIUM SCH MG: 40 INJECTION SUBCUTANEOUS at 09:38

## 2017-02-04 RX ADMIN — HYDROMORPHONE HYDROCHLORIDE PRN MG: 4 INJECTION, SOLUTION INTRAMUSCULAR; INTRAVENOUS; SUBCUTANEOUS at 04:19

## 2017-02-04 RX ADMIN — PHENYTOIN SODIUM SCH MLS/HR: 50 INJECTION INTRAMUSCULAR; INTRAVENOUS at 03:23

## 2017-02-04 RX ADMIN — DOCUSATE SODIUM SCH MG: 100 CAPSULE, LIQUID FILLED ORAL at 09:38

## 2017-02-04 RX ADMIN — HYDROMORPHONE HYDROCHLORIDE PRN MG: 4 INJECTION, SOLUTION INTRAMUSCULAR; INTRAVENOUS; SUBCUTANEOUS at 11:30

## 2017-02-04 RX ADMIN — FOLIC ACID SCH MG: 1 TABLET ORAL at 09:38

## 2017-02-04 RX ADMIN — SODIUM CHLORIDE, PRESERVATIVE FREE SCH ML: 5 INJECTION INTRAVENOUS at 09:00

## 2017-02-04 NOTE — HHI.DCPOC
Discharge Care Plan


Diagnosis:  


(1) Sickle cell pain crisis


(2) G6P deficiency (glucose-6-phosphatase deficiency)


(3) Intractable pain


(4) Anemia


Goals to Promote Your Health


* To prevent worsening of your condition and complications


* To maintain your health at the optimal level


Directions to Meet Your Goals


*** Take your medications as prescribed


*** Follow your dietary instruction


*** Follow activity as directed








*** Keep your appointments as scheduled


*** Take your immunizations and boosters as scheduled


*** If your symptoms worsen call your PCP, if no PCP go to Urgent Care Center 

or Emergency Room***


*** Smoking is Dangerous to Your Health. Avoid second hand smoke***


***Call the 24-hour hour crisis hotline for domestic abuse at 1-943.675.1053***








Kel Null DO Feb 4, 2017 09:35

## 2017-02-04 NOTE — PD.ONC.PN
Subjective


Subjective


Remarks


Afebrile overnight. Patient having persistent crisis pain, controlled with the 

IV dilaudid.





Objective


Data











  Date Time  Temp Pulse Resp B/P Pulse Ox O2 Delivery O2 Flow Rate FiO2


 


2/4/17 04:00 97.0 88 17 111/67 99   


 


2/4/17 00:00 96.2 81 17 134/66 96   


 


2/3/17 20:00 96.9 70 16 127/76 95   


 


2/3/17 16:00 96.7 77 16 132/80 98   


 


2/3/17 12:00 96.8 71 16 122/69 98   


 


2/3/17 10:15 96.8 75 16 128/69 98   








Result Diagram:  


2/3/17 0533                                                                    

            2/3/17 0533





Culture Results





Microbiology








 Date/Time Procedure Status





Source Growth 


 


 2/2/17 10:15 Aerobic Blood Culture - Preliminary Resulted





Blood Peripheral NO GROWTH IN 1 DAY 





 2/2/17 10:15 Anaerobic Blood Culture - Preliminary Resulted





Blood Peripheral NO GROWTH IN 1 DAY 


 


 2/2/17 10:20 Aerobic Blood Culture - Preliminary Resulted





Blood Peripheral NO GROWTH IN 1 DAY 





 2/2/17 10:20 Anaerobic Blood Culture - Preliminary Resulted





Blood Peripheral NO GROWTH IN 1 DAY 











Administered Medications








 Medications


  (Trade)  Dose


 Ordered  Sig/Maisha


 Route


 PRN Reason  Start Time


 Stop Time Status Last Admin


Dose Admin


 


 IV Flush


  (NS Flush)  2 ml  BID


 FLUSH


   2/2/17 09:00


    2/3/17 09:00


 


 


 Enoxaparin Sodium


  (Lovenox Inj)  40 mg  Q24H


 SQ


   2/2/17 09:00


    2/2/17 11:12


 


 


 Folic Acid


  (Folate)  1 mg  DAILY


 PO


   2/2/17 09:00


    2/3/17 09:00


 


 


 Hydroxyurea


  (Hydrea)  500 mg  DAILY


 PO


   2/2/17 09:00


    2/3/17 09:42


 


 


 Oxycodone HCl


  (Roxicodone)  10 mg  Q4H  PRN


 PO


 pain 6-10  2/2/17 08:15


    2/4/17 03:23


 


 


 Hydromorphone HCl


  (Dilaudid Pf Inj)  3 mg  Q4HR  PRN


 IV PUSH


 Breakthrough pain  2/2/17 12:00


    2/4/17 04:19


 


 


 Docusate Sodium


  (Colace)  100 mg  BID


 PO


   2/2/17 09:30


    2/3/17 19:17


 


 


 Sennosides 17.2 mg  17.2 mg  DAILY


 PO


   2/2/17 09:30


    2/3/17 09:00


 


 


 Sodium Chloride


  (NS 1000 ml Inj)  1,000 ml @ 


 100 mls/hr  Q10H


 IV


   2/3/17 16:00


    2/4/17 03:23


 








Objective Remarks


GENERAL: Young man, sitting up in bed.


SKIN: Warm and dry.


HEAD: Normocephalic.


EYES: No injection or drainage. 


NECK: Supple, trachea midline. 


CARDIOVASCULAR: Regular rate and rhythm


RESPIRATORY: Breath sounds equal bilaterally. No accessory muscle use.


GASTROINTESTINAL: Abdomen soft, non-tender, nondistended. 


EXTREMITIES: No cyanosis


NEUROLOGICAL: No obvious focal deficit. Awake, alert, and oriented x3.





Assessment/Plan


Problem List:  


(1) Sickle cell pain crisis


Status:  Acute


Plan:  2/4/17: continue Dilaudid 3mg IV q 4 hours with Oxycodone 10mg PO q 4 

hours in between. continue IVF. UPDATE: patient wants to go home, will be d/c. 


2/3/17: He received 2L NS since admission. Continue Dilaudid 3mg IV Q4H. No 

transfusion. We will continue to monitor him, give supportive care.





Assessment


20 y/o male admitted for sickle cell crisis.








Bobbi Yeboah Feb 4, 2017 08:14

## 2017-02-04 NOTE — HHI.DS
__________________________________________________





Discharge Summary


Admission Date


Feb 2, 2017 at 06:37


Discharge Date:  Feb 4, 2017


Admitting Diagnosis


sickle cell painful crisis, blood transfusion





(1) G6P deficiency (glucose-6-phosphatase deficiency)


ICD Code:  E74.01


(2) Anemia


ICD Code:  D64.9


(3) Sickle cell pain crisis


ICD Code:  D57.00


Diagnosis:  Principal





(4) Intractable pain


ICD Code:  R52


Procedures


None


Brief History - From Admission


The patient is a 21-year-old male with a past medical history of sickle cell 

disease and G6PD deficiency who is presenting to the hospital with significant 

pain.  He says his symptoms started around 11 PM last night.  He developed pain 

in his arms, legs, chest and lower back.  He says he last went to his 

hematologist a couple of weeks ago but by the time he got there the physician 

had left the office.  He did get pain prescriptions written for but he was 

unable to fill them because he did not have a ride.  The patient says he has 

been out of pain medications for the past couple of weeks.  He has been trying 

to get by with over-the-counter Tylenol.  He denies any shortness of breath.  

He denies any fever.  He has no urinary symptoms.  He denies any diarrhea or 

constipation.  He received multiple doses of Dilaudid in the emergency 

department but still continues to have significant 10 out of 10 pain.  He 

thinks he needs a blood transfusion.  He wanted something to drink.


CBC/BMP:  


2/3/17 0533                                                                    

            2/3/17 0533





Significant Findings





Laboratory Tests








Test 2/2/17 2/2/17 2/3/17





 04:20 10:15 05:33


 


White Blood Count 21.8 TH/MM3  13.5 TH/MM3





 (4.0-11.0)  (4.0-11.0)


 


Red Blood Count 2.96 MIL/MM3  3.78 MIL/MM3





 (4.50-5.90)  (4.50-5.90)


 


Hemoglobin 7.8 GM/DL  9.8 GM/DL





 (13.0-17.0)  (13.0-17.0)


 


Hematocrit 23.1 %  29.0 %





 (39.0-51.0)  (39.0-51.0)


 


Mean Corpuscular Volume 78.0 FL  76.7 FL





 (80.0-100.0)  (80.0-100.0)


 


Mean Corpuscular Hemoglobin 26.3 PG  26.0 PG





 (27.0-34.0)  (27.0-34.0)


 


Neutrophils # (Auto) 14.8 TH/MM3  8.8 TH/MM3





 (1.8-7.7)  (1.8-7.7)


 


Lymphocytes # (Auto) 5.4 TH/MM3  





 (1.0-4.8)  


 


Monocytes # (Auto) 1.1 TH/MM3  1.0 TH/MM3





 (0-0.9)  (0-0.9)


 


Neutrophils % (Manual) 71 % (16-70)  


 


Neutrophils # (Manual) 15.9 TH/MM3  9.3 TH/MM3





 (1.8-7.7)  (1.8-7.7)


 


Nucleated Red Blood Cells 2 /100 WBC  8 /100 WBC





 (0-0)  (0-0)


 


Polychromasia 8.5 % (0.0-1.9)  3.7 % (0.0-1.9)


 


Sickle Cells 1+  (NORMAL)  1+  (NORMAL)


 


Target Cells 1+  (NORMAL)  1+  (NORMAL)


 


Reticulocyte Count 9.3 % (0.4-3.0)  


 


Absolute Reticulocyte Count 273.8 MIL/L  





 (20.0-150.0)  


 


Chloride Level  110 MEQ/L 





  () 


 


Total Bilirubin  3.6 MG/DL 4.3 MG/DL





  (0.2-1.0) (0.2-1.0)


 


Aspartate Amino Transf  38 U/L (15-37) 40 U/L (15-37)





(AST/SGOT)   


 


Troponin I  LESS THAN 0.02 LESS THAN 0.02





  NG/ML NG/ML





  (0.02-0.05) (0.02-0.05)


 


Red Cell Distribution Width   17.5 %





   (11.6-17.2)


 


Eosinophils (%) (Auto)   4.1 % (0.0-4.0)


 


Eosinophils # (Auto)   0.6 TH/MM3





   (0-0.4)


 


Potassium Level   3.4 MEQ/L





   (3.5-5.1)


 


Blood Urea Nitrogen   6 MG/DL (7-18)


 


Creatinine   0.50 MG/DL





   (0.60-1.30)


 


Indirect Bilirubin   4.1 MG/DL





   (0.0-0.8)








Imaging





Last Impressions








Chest X-Ray 2/2/17 0000 Signed





Impressions: 





 Service Date/Time:  Thursday, February 2, 2017 06:43 - CONCLUSION:  1. 





 Cardiomegaly. Minimal basilar atelectasis.     Matteo Abdul MD 








PE at Discharge


GENERAL: This is a well-nourished, well-developed patient, in some distress 

from pain.


SKIN: No rashes, ecchymoses or lesions. Cool and dry.


HEAD: Atraumatic. Normocephalic. No temporal or scalp tenderness.


EYES: Pupils equal round and reactive. Extraocular motions intact. No scleral 

icterus. No injection or drainage. 


ENT: Nose without bleeding, purulent drainage or septal hematoma. Throat 

without erythema, tonsillar hypertrophy or exudate. Uvula midline. Airway 

patent.


NECK: Trachea midline. No JVD or lymphadenopathy. Supple, nontender, no 

meningeal signs.


CARDIOVASCULAR: Regular rate and rhythm without murmurs, gallops, or rubs. 


RESPIRATORY: Clear to auscultation. Breath sounds equal bilaterally. No wheezes

, rales, or rhonchi.  


GASTROINTESTINAL: Abdomen soft, non-tender, nondistended. No hepato-splenomegaly

, or palpable masses. No guarding.


MUSCULOSKELETAL: Extremities without clubbing, cyanosis, or edema. Tenderness 

to palpation of lower back. 


NEUROLOGICAL: Awake and alert. Cranial nerves II through XII intact.  Motor and 

sensory grossly within normal limits. Five out of 5 muscle strength in all 

muscle groups.  Normal speech.


PSYCH: Slightly flattened affect.


Pt update on day of discharge


The pt said he wanted to go home today. He wanted to know if he'd be getting 

pain medications. Discussed with hematology.


Hospital Course


Sickle cell crisis


The pt has significant pain in his legs, arms, lower back and chest. Hemoglobin 

is stable following transfusion of two units of red cells. Hematology was 

consulted.  He received pain control with PO and IV pain meds along with a 

bowel regimen. He received IVFs. His LFTs were trended. He will follow up with 

hematology as an outpt.





Chest pain


Trops negative. EKG with NSR, 1st degree AVB. He received pain control and 

oxygen as needed.





Leukocytosis/ SIRS


WBC 21.8 on admission.  The patient was also tachycardic. CXR unremarkable. 

Blood cultures with NGTD. Improved.


Pt Condition on Discharge:  Fair


Discharge Disposition:  Discharge Home


Discharge Time:  <= 30 minutes


Discharge Instructions


DIET: Follow Instructions for:  As Tolerated, No Restrictions


Activities you can perform:  Regular-No Restrictions


Follow up Referrals:  


Oncology - 1 Week with Dr. Glover


PCP Follow-up - 1 Week





New Medications:  


Potassium Chloride ER (Potassium Chloride ER) 20 Meq Tab


20 MEQ PO DAILY Electrolyte Replacement #7 Ref 0 TAB


Oxycodone (Oxycodone) 5 Mg Tab


10 MG PO Q4H PRN pain #45 TAB


 


Continued Medications:  


Folic Acid (Folic Acid) 400 Mcg Tab


400 MCG PO DAILY Nutritional Supplement Ref 0 TAB


Hydroxyurea (Hydroxyurea) 500 Mg Cap


500 MG PO DAILY Ref 0 CAP











Kel Null DO Feb 4, 2017 09:42

## 2017-03-09 ENCOUNTER — HOSPITAL ENCOUNTER (EMERGENCY)
Dept: HOSPITAL 17 - NEPC | Age: 22
Discharge: HOME | End: 2017-03-09
Payer: COMMERCIAL

## 2017-03-09 VITALS — HEIGHT: 66 IN | BODY MASS INDEX: 22.32 KG/M2 | WEIGHT: 138.89 LBS

## 2017-03-09 VITALS
DIASTOLIC BLOOD PRESSURE: 60 MMHG | HEART RATE: 66 BPM | RESPIRATION RATE: 16 BRPM | OXYGEN SATURATION: 99 % | SYSTOLIC BLOOD PRESSURE: 133 MMHG

## 2017-03-09 VITALS
RESPIRATION RATE: 15 BRPM | SYSTOLIC BLOOD PRESSURE: 138 MMHG | DIASTOLIC BLOOD PRESSURE: 78 MMHG | HEART RATE: 98 BPM | TEMPERATURE: 98.1 F | OXYGEN SATURATION: 99 %

## 2017-03-09 VITALS
RESPIRATION RATE: 18 BRPM | HEART RATE: 77 BPM | OXYGEN SATURATION: 100 % | SYSTOLIC BLOOD PRESSURE: 122 MMHG | DIASTOLIC BLOOD PRESSURE: 70 MMHG

## 2017-03-09 DIAGNOSIS — D57.00: Primary | ICD-10-CM

## 2017-03-09 LAB
BASOPHILS # BLD AUTO: 0.1 TH/MM3 (ref 0–0.2)
BASOPHILS NFR BLD: 0.7 % (ref 0–2)
EOSINOPHIL # BLD: 0.6 TH/MM3 (ref 0–0.4)
EOSINOPHIL NFR BLD: 4.6 % (ref 0–4)
ERYTHROCYTE [DISTWIDTH] IN BLOOD BY AUTOMATED COUNT: 15.4 % (ref 11.6–17.2)
HCT VFR BLD CALC: 25.6 % (ref 39–51)
HEMO FLAGS: (no result)
HGB S BLD QL SOLY: (no result)
LYMPHOCYTES # BLD AUTO: 4.1 TH/MM3 (ref 1–4.8)
LYMPHOCYTES NFR BLD AUTO: 34.4 % (ref 9–44)
MCH RBC QN AUTO: 25.8 PG (ref 27–34)
MCHC RBC AUTO-ENTMCNC: 32.2 % (ref 32–36)
MCV RBC AUTO: 80.2 FL (ref 80–100)
MONOCYTES NFR BLD: 5.4 % (ref 0–8)
NEUTROPHILS # BLD AUTO: 6.6 TH/MM3 (ref 1.8–7.7)
NEUTROPHILS NFR BLD AUTO: 54.9 % (ref 16–70)
PLAT MORPH BLD: NORMAL
PLATELET # BLD: 233 TH/MM3 (ref 150–450)
PLATELET BLD QL SMEAR: NORMAL
RBC # BLD AUTO: 3.2 MIL/MM3 (ref 4.5–5.9)
RETICS/RBC NFR: 7.3 % (ref 0.4–3)
REVIEW FLAG: (no result)
SCAN/DIFF: (no result)
TARGETS BLD QL SMEAR: (no result)
WBC # BLD AUTO: 12 TH/MM3 (ref 4–11)

## 2017-03-09 PROCEDURE — 96375 TX/PRO/DX INJ NEW DRUG ADDON: CPT

## 2017-03-09 PROCEDURE — 96361 HYDRATE IV INFUSION ADD-ON: CPT

## 2017-03-09 PROCEDURE — 93005 ELECTROCARDIOGRAM TRACING: CPT

## 2017-03-09 PROCEDURE — 96374 THER/PROPH/DIAG INJ IV PUSH: CPT

## 2017-03-09 PROCEDURE — 96376 TX/PRO/DX INJ SAME DRUG ADON: CPT

## 2017-03-09 PROCEDURE — 85044 MANUAL RETICULOCYTE COUNT: CPT

## 2017-03-09 PROCEDURE — 99285 EMERGENCY DEPT VISIT HI MDM: CPT

## 2017-03-09 PROCEDURE — 85025 COMPLETE CBC W/AUTO DIFF WBC: CPT

## 2017-03-09 NOTE — PD
HPI


Chief Complaint:  Sickle Cell


Time Seen by Provider:  17:31


Travel History


International Travel<30 days:  No


Contact w/Intl Traveler<30days:  No


Traveled to known affect area:  No





History of Present Illness


HPI


21-year-old male with history of sickle cell disease who has been in this ER 

multiple times for painful crisis is back again for body wide pain.  He says 

the pain started yesterday.  He says the pain is everywhere.  Vital signs were 

stable.  No aggravating or relieving factors.  No history of fever or chills.  

No history of breathing difficulty.





PFSH


Past Medical History


*** Narrative Medical


List of his past medical, surgical, social and family history is reviewed from 

the nursing note.


Hx Anticoagulant Therapy:  No


Anemia:  Yes


Arthritis:  No


Asthma:  No


Autoimmune Disease:  No


Blood Disorders:  Yes ( G6PD DEFICIENCY)


Anxiety:  No


Depression:  No


Heart Rhythm Problems:  No


Cancer:  No


High Cholesterol:  No


Chemotherapy:  No


Chest Pain:  Yes


Congestive Heart Failure:  No


COPD:  No


Cerebrovascular Accident:  No


Diabetes:  No


Diminished Hearing:  No


Endocrine:  No


Gastrointestinal Disorders:  Yes (gall stones)


GERD:  No


Genitourinary:  No


Hiatal Hernia:  No


Immune Disorder:  Yes (SICKLE CELL)


Implanted Vascular Access Dvce:  No


Kidney Stones:  No


Musculoskeletal:  No


Neurologic:  No


Psychiatric:  Yes


Reproductive:  No


Respiratory:  No


Immunizations Current:  Yes


Migraines:  No


Pneumonia:  Yes


Radiation Therapy:  No


Renal Failure:  No


Seizures:  No


Sickle Cell Disease:  Yes


Sleep Apnea:  No


Thyroid Disease:  No


Ulcer:  No





Past Surgical History


Abdominal Surgery:  No


AICD:  No


Arteriovenous Shunt:  No


Cardiac Surgery:  No


Ear Surgery:  No


Endocrine Surgery:  No


Eye Surgery:  No


Genitourinary Surgery:  No


Gynecologic Surgery:  No


Hysterectomy:  No


Insulin Pump:  No


Joint Replacement:  No


Oral Surgery:  Yes (dental surgery "AS A SMALL CHILD")


Pacemaker:  No


Thoracic Surgery:  No


Other Surgery:  Yes (PICC LINE /picc line removal)





Social History


Alcohol Use:  No


Tobacco Use:  No


Substance Use:  No





Allergies-Medications


(Allergen,Severity, Reaction):  


Coded Allergies:  


     Sulfa (Verified  Allergy, Intermediate, 3/9/17)


     Morphine (Verified  Allergy, Unknown, 3/9/17)


Uncoded Allergies:  


     LEONARD BEANS (Allergy, Intermediate, 10/10/14)


Comments


List of his allergies reviewed from the nursing note.


Reported Meds & Prescriptions





Reported Meds & Active Scripts


Active


Norco (Hydrocodone-Acetaminophen)  Mg Tab 1 Tab PO Q6H PRN


Potassium Chloride ER (Potassium Chloride) 20 Meq Tab 20 Meq PO DAILY


Oxycodone (Oxycodone HCl) 5 Mg Tab 10 Mg PO Q4H PRN


Reported


Hydroxyurea 500 Mg Cap 500 Mg PO DAILY


Folic Acid 400 Mcg Tab 400 Mcg PO DAILY





Narrative Medication


List of his home medications reviewed from the nursing note.





Review of Systems


Except as stated in HPI:  all other systems reviewed are Neg





Physical Exam


Narrative


GENERAL: Awake, alert, no obvious distress


SKIN: Warm and dry.


HEAD: Atraumatic. Normocephalic. 


EYES: Pupils equal and round. No scleral icterus. No injection or drainage. 


ENT: No nasal bleeding or discharge.  Mucous membranes pink and moist.


NECK: Trachea midline. No JVD. 


CARDIOVASCULAR: Regular rate and rhythm.  No murmur appreciated.


RESPIRATORY: No accessory muscle use. Clear to auscultation. Breath sounds 

equal bilaterally. 


GASTROINTESTINAL: Abdomen soft, non-tender, nondistended. Hepatic and splenic 

margins not palpable. 


MUSCULOSKELETAL: No obvious deformities. No clubbing.  No cyanosis.  No edema. 


NEUROLOGICAL: Awake and alert. No obvious cranial nerve deficits.  Motor 

grossly within normal limits. Normal speech.


PSYCHIATRIC: Appropriate mood and affect; insight and judgment normal.





Data


Data


Last Documented VS





Vital Signs








  Date Time  Temp Pulse Resp B/P Pulse Ox O2 Delivery O2 Flow Rate FiO2


 


3/9/17 19:31  77 18 122/70 100 Room Air  


 


3/9/17 15:50 98.1       








Orders





 Electrocardiogram (3/9/17 )


Complete Blood Count With Diff (3/9/17 17:56)


Hydromorphone Pf Inj (Dilaudid Pf Inj) (3/9/17 18:00)


Sodium Chlor 0.9% 1000 Ml Inj (Ns 1000 M (3/9/17 18:00)


Retic Count (3/9/17 17:59)


Hydromorphone Pf Inj (Dilaudid Pf Inj) (3/9/17 19:15)


Diphenhydramine Inj (Benadryl Inj) (3/9/17 19:15)


Ondansetron Inj (Zofran Inj) (3/9/17 19:15)


Sodium Chlor 0.9% 1000 Ml Inj (Ns 1000 M (3/9/17 19:15)


Hydromorphone Pf Inj (Dilaudid Pf Inj) (3/9/17 20:30)





Labs





 Laboratory Tests








Test 3/9/17





 18:10


 


White Blood Count 12.0 TH/MM3


 


Red Blood Count 3.20 MIL/MM3


 


Hemoglobin 8.2 GM/DL


 


Hematocrit 25.6 %


 


Mean Corpuscular Volume 80.2 FL


 


Mean Corpuscular Hemoglobin 25.8 PG


 


Mean Corpuscular Hemoglobin 32.2 %





Concent 


 


Red Cell Distribution Width 15.4 %


 


Platelet Count 233 TH/MM3


 


Mean Platelet Volume 8.2 FL


 


Neutrophils (%) (Auto) 54.9 %


 


Lymphocytes (%) (Auto) 34.4 %


 


Monocytes (%) (Auto) 5.4 %


 


Eosinophils (%) (Auto) 4.6 %


 


Basophils (%) (Auto) 0.7 %


 


Neutrophils # (Auto) 6.6 TH/MM3


 


Lymphocytes # (Auto) 4.1 TH/MM3


 


Monocytes # (Auto) 0.6 TH/MM3


 


Eosinophils # (Auto) 0.6 TH/MM3


 


Basophils # (Auto) 0.1 TH/MM3


 


CBC Comment AUTO DIFF 


 


Differential Comment AUTO DIFF





 CONFIRMED


 


Platelet Estimate NORMAL 


 


Platelet Morphology Comment NORMAL 


 


Sickle Cells 1+ 


 


Target Cells 2+ 


 


Reticulocyte Count 7.3 %


 


Absolute Reticulocyte Count 227.8 MIL/L











MDM


Medical Decision Making


Medical Screen Exam Complete:  Yes


Emergency Medical Condition:  Yes


Medical Record Reviewed:  Yes


Differential Diagnosis


Sickle cell disease, painful crisis


Narrative Course


6:45 PM awaiting for the blood test results.  Patient is getting IV fluid bolus 

and IV Dilaudid.  Case will be signed over to the oncoming ER physician.


Scripts


Hydrocodone-Acetaminophen (Norco) Mg Tab1 Tab PO Q6H PRN (PAIN) #15 TAB  

Ref 0


   Prov:Valdemar Pat MD         3/9/17








Sara Young MD Mar 9, 2017 17:31

## 2017-03-09 NOTE — PD
Physical Exam


Date Seen by Provider:  Mar 9, 2017


Time Seen by Provider:  18:59


Narrative


The patient is a 21-year-old  [place] male who was initially evaluated 

by the previous physician, Dr. Young.  The patient has a history of sickle 

cell disease and presents with vaso-occlusive crisis.  The patient was signed 

out at 7 PM with CBC and reticulocyte count as well as reevaluation of pain 

pending.





Data


Data


Last Documented VS





Vital Signs








  Date Time  Temp Pulse Resp B/P Pulse Ox O2 Delivery O2 Flow Rate FiO2


 


3/9/17 19:31  77 18 122/70 100 Room Air  


 


3/9/17 15:50 98.1       








Orders





 Electrocardiogram (3/9/17 )


Complete Blood Count With Diff (3/9/17 17:56)


Hydromorphone Pf Inj (Dilaudid Pf Inj) (3/9/17 18:00)


Sodium Chlor 0.9% 1000 Ml Inj (Ns 1000 M (3/9/17 18:00)


Retic Count (3/9/17 17:59)


Hydromorphone Pf Inj (Dilaudid Pf Inj) (3/9/17 19:15)


Diphenhydramine Inj (Benadryl Inj) (3/9/17 19:15)


Ondansetron Inj (Zofran Inj) (3/9/17 19:15)


Sodium Chlor 0.9% 1000 Ml Inj (Ns 1000 M (3/9/17 19:15)


Hydromorphone Pf Inj (Dilaudid Pf Inj) (3/9/17 20:30)





Labs





 Laboratory Tests








Test 3/9/17





 18:10


 


White Blood Count 12.0 TH/MM3


 


Red Blood Count 3.20 MIL/MM3


 


Hemoglobin 8.2 GM/DL


 


Hematocrit 25.6 %


 


Mean Corpuscular Volume 80.2 FL


 


Mean Corpuscular Hemoglobin 25.8 PG


 


Mean Corpuscular Hemoglobin 32.2 %





Concent 


 


Red Cell Distribution Width 15.4 %


 


Platelet Count 233 TH/MM3


 


Mean Platelet Volume 8.2 FL


 


Neutrophils (%) (Auto) 54.9 %


 


Lymphocytes (%) (Auto) 34.4 %


 


Monocytes (%) (Auto) 5.4 %


 


Eosinophils (%) (Auto) 4.6 %


 


Basophils (%) (Auto) 0.7 %


 


Neutrophils # (Auto) 6.6 TH/MM3


 


Lymphocytes # (Auto) 4.1 TH/MM3


 


Monocytes # (Auto) 0.6 TH/MM3


 


Eosinophils # (Auto) 0.6 TH/MM3


 


Basophils # (Auto) 0.1 TH/MM3


 


CBC Comment AUTO DIFF 


 


Differential Comment AUTO DIFF





 CONFIRMED


 


Platelet Estimate NORMAL 


 


Platelet Morphology Comment NORMAL 


 


Sickle Cells 1+ 


 


Target Cells 2+ 


 


Reticulocyte Count 7.3 %


 


Absolute Reticulocyte Count 227.8 MIL/L











[facility]


Medical Record Reviewed:  Yes


Supervised Visit with EDA:  No


Interpretation(s)





 Laboratory Tests








Test 3/9/17





 18:10


 


White Blood Count 12.0 TH/MM3


 


Red Blood Count 3.20 MIL/MM3


 


Hemoglobin 8.2 GM/DL


 


Hematocrit 25.6 %


 


Mean Corpuscular Volume 80.2 FL


 


Mean Corpuscular Hemoglobin 25.8 PG


 


Mean Corpuscular Hemoglobin 32.2 %





Concent 


 


Red Cell Distribution Width 15.4 %


 


Platelet Count 233 TH/MM3


 


Mean Platelet Volume 8.2 FL


 


Neutrophils (%) (Auto) 54.9 %


 


Lymphocytes (%) (Auto) 34.4 %


 


Monocytes (%) (Auto) 5.4 %


 


Eosinophils (%) (Auto) 4.6 %


 


Basophils (%) (Auto) 0.7 %


 


Neutrophils # (Auto) 6.6 TH/MM3


 


Lymphocytes # (Auto) 4.1 TH/MM3


 


Monocytes # (Auto) 0.6 TH/MM3


 


Eosinophils # (Auto) 0.6 TH/MM3


 


Basophils # (Auto) 0.1 TH/MM3


 


CBC Comment AUTO DIFF 


 


Differential Comment AUTO DIFF





 CONFIRMED


 


Platelet Estimate NORMAL 


 


Platelet Morphology Comment NORMAL 


 


Sickle Cells 1+ 


 


Target Cells 2+ 


 


Reticulocyte Count 7.3 %


 


Absolute Reticulocyte Count 227.8 MIL/L








Differential Diagnosis


Differential diagnosis includes vaso-occlusive crisis, sickle cell anemia, 

acute chest syndrome, sepsis, symptomatic anemia.


Narrative Course


The patient was initially evaluated by the previous physician, Dr. Young.  

Please refer to the initial history, physical, diagnostic evaluation, and 

treatment modality plan.  The patient signed out at 7 PM with CBC, reticulocyte 

count, and reevaluation of his pain pending.  The patient's white count was 8.2

, recheck count was appropriate.  The patient was reevaluated, continued to 

have pain, therefore, was dosed once again with Dilaudid, Benadryl, and IV 

fluids.  Patient was reevaluated at 8:15 PM, his pain and improved, however, 

was still present.  Therefore, patient was administered another dose of 

Dilaudid.  The patient be discharged home on oral pain medications and is 

advised to follow-up with his hematologist and her primary physician.  Return 

if symptoms worsen or progress.


Diagnosis





 Primary Impression:  


 Vaso-occlusive sickle cell crisis


Patient Instructions:  General Instructions





***Additional Instruction:


Medications as directed.  Follow-up with your hematologist and your primary 

physician.  Return if symptoms worsen or progress.


***Med/Other Pt SpecificInfo:  Prescription(s) given


Scripts


Hydrocodone-Acetaminophen (Norco) Mg Tab1 Tab PO Q6H PRN (PAIN) #15 TAB  

Ref 0


   Prov:Valdemar Pat MD         3/9/17


Disposition:  01 DISCHARGE HOME


Condition:  Stable








Valdemar Pat MD Mar 9, 2017 19:00

## 2017-03-10 NOTE — EKG
Date Performed: 03/09/2017       Time Performed: 15:42:46

 

PTAGE:      21 years

 

EKG:      SINUS BRADYCARDIA BORDERLINE ECG

 

PREVIOUS TRACING       : 02/02/2017 17.16 Compared to prior tracing no significant change

 

DOCTOR:   Smith Matthew  Interpretating Date/Time  03/10/2017 19:56:27

## 2017-04-04 ENCOUNTER — HOSPITAL ENCOUNTER (EMERGENCY)
Dept: HOSPITAL 17 - NEPA | Age: 22
Discharge: HOME | End: 2017-04-04
Payer: COMMERCIAL

## 2017-04-04 VITALS — BODY MASS INDEX: 22.49 KG/M2 | HEIGHT: 67 IN | WEIGHT: 143.3 LBS

## 2017-04-04 VITALS
HEART RATE: 79 BPM | OXYGEN SATURATION: 100 % | RESPIRATION RATE: 16 BRPM | SYSTOLIC BLOOD PRESSURE: 120 MMHG | DIASTOLIC BLOOD PRESSURE: 63 MMHG

## 2017-04-04 VITALS
RESPIRATION RATE: 17 BRPM | DIASTOLIC BLOOD PRESSURE: 65 MMHG | OXYGEN SATURATION: 99 % | HEART RATE: 67 BPM | SYSTOLIC BLOOD PRESSURE: 144 MMHG

## 2017-04-04 VITALS
TEMPERATURE: 97.8 F | OXYGEN SATURATION: 100 % | RESPIRATION RATE: 15 BRPM | SYSTOLIC BLOOD PRESSURE: 119 MMHG | HEART RATE: 71 BPM | DIASTOLIC BLOOD PRESSURE: 86 MMHG

## 2017-04-04 VITALS
SYSTOLIC BLOOD PRESSURE: 108 MMHG | OXYGEN SATURATION: 100 % | RESPIRATION RATE: 16 BRPM | HEART RATE: 69 BPM | DIASTOLIC BLOOD PRESSURE: 65 MMHG

## 2017-04-04 VITALS — OXYGEN SATURATION: 100 %

## 2017-04-04 DIAGNOSIS — D57.00: Primary | ICD-10-CM

## 2017-04-04 LAB
BASO STIPL BLD QL SMEAR: (no result)
BASOPHILS # BLD AUTO: 0.1 TH/MM3 (ref 0–0.2)
BASOPHILS NFR BLD: 0.5 % (ref 0–2)
DACRYOCYTES BLD QL SMEAR: (no result)
EOSINOPHIL # BLD: 0.3 TH/MM3 (ref 0–0.4)
EOSINOPHIL NFR BLD: 2 % (ref 0–4)
EOSINOPHIL NFR BLD: 2.5 % (ref 0–4)
ERYTHROCYTE [DISTWIDTH] IN BLOOD BY AUTOMATED COUNT: 17.4 % (ref 11.6–17.2)
HCT VFR BLD CALC: 27.1 % (ref 39–51)
HEMO FLAGS: (no result)
HGB S BLD QL SOLY: (no result)
HOWELL-JOLLY BOD BLD QL SMEAR: PRESENT
LYMPHOCYTES # BLD AUTO: 3.1 TH/MM3 (ref 1–4.8)
LYMPHOCYTES NFR BLD AUTO: 28.3 % (ref 9–44)
MCH RBC QN AUTO: 25.4 PG (ref 27–34)
MCHC RBC AUTO-ENTMCNC: 31.9 % (ref 32–36)
MCV RBC AUTO: 79.5 FL (ref 80–100)
MONOCYTES NFR BLD: 7.5 % (ref 0–8)
NEUTROPHILS # BLD AUTO: 6.7 TH/MM3 (ref 1.8–7.7)
NEUTROPHILS NFR BLD AUTO: 61.2 % (ref 16–70)
NEUTS BAND # BLD MANUAL: 6.9 TH/MM3 (ref 1.8–7.7)
NEUTS BAND NFR BLD: 5 % (ref 0–6)
NEUTS SEG NFR BLD MANUAL: 58 % (ref 16–70)
OVALOCYTES BLD QL SMEAR: (no result)
PLAT MORPH BLD: NORMAL
PLATELET # BLD: 268 TH/MM3 (ref 150–450)
PLATELET BLD QL SMEAR: NORMAL
RBC # BLD AUTO: 3.4 MIL/MM3 (ref 4.5–5.9)
RETICS/RBC NFR: 9.3 % (ref 0.4–3)
REVIEW FLAG: (no result)
SCAN/DIFF: (no result)
TARGETS BLD QL SMEAR: (no result)
WBC # BLD AUTO: 11 TH/MM3 (ref 4–11)
WBC DIFF SAMPLE: 100
WBC NRBC COR # BLD: 6 /100 WBC (ref 0–0)
WBC TOXIC VACUOLES BLD QL SMEAR: PRESENT

## 2017-04-04 PROCEDURE — 96375 TX/PRO/DX INJ NEW DRUG ADDON: CPT

## 2017-04-04 PROCEDURE — 96361 HYDRATE IV INFUSION ADD-ON: CPT

## 2017-04-04 PROCEDURE — 96374 THER/PROPH/DIAG INJ IV PUSH: CPT

## 2017-04-04 PROCEDURE — 99284 EMERGENCY DEPT VISIT MOD MDM: CPT

## 2017-04-04 PROCEDURE — 85007 BL SMEAR W/DIFF WBC COUNT: CPT

## 2017-04-04 PROCEDURE — 85027 COMPLETE CBC AUTOMATED: CPT

## 2017-04-04 PROCEDURE — 85044 MANUAL RETICULOCYTE COUNT: CPT

## 2017-04-04 PROCEDURE — 96376 TX/PRO/DX INJ SAME DRUG ADON: CPT

## 2017-04-04 NOTE — PD
Physical Exam


Date Seen by Provider:  Apr 4, 2017


Time Seen by Provider:  14:39


Narrative


21 YOBM C/O SICKLE CELL CRISIS PAIN SINCE LAST NIGHT.





VSS, AWAITING BED PLACEMENT





Data


Data


Last Documented VS





Vital Signs








  Date Time  Temp Pulse Resp B/P Pulse Ox O2 Delivery O2 Flow Rate FiO2


 


4/4/17 14:36 97.8 71 15 119/86 100   











MDM


Medical Record Reviewed:  Yes


Supervised Visit with EDA:  Yes








Matteo Dumont Apr 4, 2017 14:41

## 2017-04-04 NOTE — PD
HPI


Chief Complaint:  Sickle Cell


Time Seen by Provider:  17:03


Travel History


International Travel<30 days:  No


Contact w/Intl Traveler<30days:  No


Traveled to known affect area:  No





History of Present Illness


HPI


The patient is a 21-year-old  [place] male who presents emergency 

department for sickle cell pain crisis.  The patient has a history of sickle 

cell SS and is followed by his hematologist, Dr. Glover.  The patient states 

this pain started last night at 2 AM, includes chest pain, back pain, and 

extremity pain.  The patient states that this is his typical sickle cell pain 

crisis.  The patient denies any fever, cough, or shortness of breath.  He 

denies any associated nausea, vomiting, diarrhea, or abdominal pain.  The 

patient normally takes hydrocodone on an outpatient basis, however, ran out of 

his hydrocodone.  This pain is moderate, but should it is sickle cell SS, and 

alleviated in the past with pain medications.  The patient was last seen by Dr. Glover in the office one week ago Monday.  The patient does not have a 

primary physician.





PFSH


Past Medical History


Hx Anticoagulant Therapy:  No


Anemia:  Yes


Arthritis:  No


Asthma:  No


Autoimmune Disease:  No


Blood Disorders:  Yes ( G6PD DEFICIENCY)


Anxiety:  No


Depression:  No


Heart Rhythm Problems:  No


Cancer:  No


High Cholesterol:  No


Chemotherapy:  No


Chest Pain:  Yes


Congestive Heart Failure:  No


COPD:  No


Cerebrovascular Accident:  No


Diabetes:  No


Diminished Hearing:  No


Endocrine:  No


Gastrointestinal Disorders:  Yes (gall stones)


GERD:  No


Genitourinary:  No


Hiatal Hernia:  No


Immune Disorder:  Yes (SICKLE CELL)


Implanted Vascular Access Dvce:  No


Kidney Stones:  No


Musculoskeletal:  No


Neurologic:  No


Psychiatric:  Yes


Reproductive:  No


Respiratory:  No


Immunizations Current:  Yes


Migraines:  No


Pneumonia:  Yes


Radiation Therapy:  No


Renal Failure:  No


Seizures:  No


Sickle Cell Disease:  Yes


Sleep Apnea:  No


Thyroid Disease:  No


Ulcer:  No





Past Surgical History


Abdominal Surgery:  No


AICD:  No


Arteriovenous Shunt:  No


Cardiac Surgery:  No


Ear Surgery:  No


Endocrine Surgery:  No


Eye Surgery:  No


Genitourinary Surgery:  No


Gynecologic Surgery:  No


Hysterectomy:  No


Insulin Pump:  No


Joint Replacement:  No


Oral Surgery:  Yes (dental surgery "AS A SMALL CHILD")


Pacemaker:  No


Thoracic Surgery:  No


Other Surgery:  Yes (PICC LINE /picc line removal)





Social History


Alcohol Use:  No


Tobacco Use:  No


Substance Use:  No





Allergies-Medications


(Allergen,Severity, Reaction):  


Coded Allergies:  


     Sulfa (Verified  Allergy, Intermediate, 3/9/17)


     Morphine (Verified  Allergy, Unknown, 3/9/17)


Uncoded Allergies:  


     LEONARD BEANS (Allergy, Intermediate, 10/10/14)


Reported Meds & Prescriptions





Reported Meds & Active Scripts


Active


Norco (Hydrocodone-Acetaminophen)  Mg Tab 1 Tab PO Q6H PRN


Norco (Hydrocodone-Acetaminophen)  Mg Tab 1 Tab PO Q6H PRN


Reported


Hydroxyurea 500 Mg Cap 500 Mg PO DAILY


Folic Acid 400 Mcg Tab 400 Mcg PO DAILY








Review of Systems


Except as stated in HPI:  all other systems reviewed are Neg


General / Constitutional:  No: Fever, Chills


Cardiovascular:  Positive: Chest Pain or Discomfort


Respiratory:  No: Cough, Shortness of Breath


Gastrointestinal:  No: Nausea, Vomiting, Abdominal Pain


Musculoskeletal:  Positive: Myalgias, Arthralgias





Physical Exam


Narrative


GENERAL: Awake, alert, 21-year-old  [place] male who appears his stated 

age and is in no acute respiratory distress.


SKIN: Focused skin assessment warm/dry.  Tattoos noted over the anterior chest 

wall.


HEAD: Atraumatic. Normocephalic. 


EYES: Pupils equal and round.  Bilateral scleral icterus.


ENT: No nasal bleeding or discharge.  Mucous membranes pink and moist.


NECK: Trachea midline. No JVD. 


CARDIOVASCULAR: Regular rate and rhythm.  No murmur appreciated.


RESPIRATORY: No accessory muscle use. Clear to auscultation. Breath sounds 

equal bilaterally. 


GASTROINTESTINAL: Abdomen soft, non-tender, nondistended.  No rebound 

tenderness.


MUSCULOSKELETAL: No obvious deformities. No clubbing.  No cyanosis.  No edema. 


NEUROLOGICAL: Awake and alert. No obvious cranial nerve deficits.  Motor 

grossly within normal limits. Normal speech.


PSYCHIATRIC: Appropriate mood and affect; insight and judgment normal.





Data


Data


Last Documented VS





Vital Signs








  Date Time  Temp Pulse Resp B/P Pulse Ox O2 Delivery O2 Flow Rate FiO2


 


4/4/17 21:07  67 17 144/65 99   


 


4/4/17 17:26      Room Air  


 


4/4/17 14:36 97.8       








Orders





 Complete Blood Count With Diff (4/4/17 17:11)


Retic Count (4/4/17 17:11)


Ecg Monitoring (4/4/17 17:11)


Iv Access Insert/Monitor (4/4/17 17:11)


Oximetry (4/4/17 17:11)


Hydromorphone Pf Inj (Dilaudid Pf Inj) (4/4/17 17:15)


Ketorolac Inj (Toradol Inj) (4/4/17 17:15)


Ondansetron Inj (Zofran Inj) (4/4/17 17:15)


Sodium Chloride 0.9% Flush (Ns Flush) (4/4/17 17:15)


Sodium Chlor 0.9% 1000 Ml Inj (Ns 1000 M (4/4/17 17:11)


Diphenhydramine Inj (Benadryl Inj) (4/4/17 17:15)


Hydromorphone Pf Inj (Dilaudid Pf Inj) (4/4/17 19:00)


Diphenhydramine Inj (Benadryl Inj) (4/4/17 19:00)


Sodium Chlor 0.9% 1000 Ml Inj (Ns 1000 M (4/4/17 19:00)


Hydromorphone Pf Inj (Dilaudid Pf Inj) (4/4/17 20:15)





Labs





 Laboratory Tests








Test 4/4/17





 17:25


 


White Blood Count 11.0 TH/MM3


 


Red Blood Count 3.40 MIL/MM3


 


Hemoglobin 8.6 GM/DL


 


Hematocrit 27.1 %


 


Mean Corpuscular Volume 79.5 FL


 


Mean Corpuscular Hemoglobin 25.4 PG


 


Mean Corpuscular Hemoglobin 31.9 %





Concent 


 


Red Cell Distribution Width 17.4 %


 


Platelet Count 268 TH/MM3


 


Mean Platelet Volume 8.1 FL


 


Neutrophils (%) (Auto) 61.2 %


 


Lymphocytes (%) (Auto) 28.3 %


 


Monocytes (%) (Auto) 7.5 %


 


Eosinophils (%) (Auto) 2.5 %


 


Basophils (%) (Auto) 0.5 %


 


Neutrophils # (Auto) 6.7 TH/MM3


 


Lymphocytes # (Auto) 3.1 TH/MM3


 


Monocytes # (Auto) 0.8 TH/MM3


 


Eosinophils # (Auto) 0.3 TH/MM3


 


Basophils # (Auto) 0.1 TH/MM3


 


CBC Comment AUTO DIFF 


 


Differential Total Cells 100 





Counted 


 


Neutrophils % (Manual) 58 %


 


Band Neutrophils % 5 %


 


Lymphocytes % 29 %


 


Monocytes % 6 %


 


Eosinophils % 2 %


 


Neutrophils # (Manual) 6.9 TH/MM3


 


Nucleated Red Blood Cells 6 /100 WBC


 


Differential Comment FINAL DIFF





 MANUAL


 


Atypical Lymphocytes  %


 


Toxic Vacuolation PRESENT 


 


Platelet Estimate NORMAL 


 


Platelet Morphology Comment NORMAL 


 


Basophilic Stippling FAINT 


 


Sickle Cells 2+ 


 


Target Cells 1+ 


 


Tear Drop Cells 1+ 


 


Ovalocytes 1+ 


 


[place] Bodies PRESENT 


 


Reticulocyte Count 9.3 %


 


Absolute Reticulocyte Count 318.1 MIL/L











MDM


Medical Decision Making


Medical Screen Exam Complete:  Yes


Emergency Medical Condition:  Yes


Medical Record Reviewed:  Yes


Differential Diagnosis


Differential diagnosis includes vaso-occlusive crisis, dehydration, chronic pain

, acute chest syndrome, symptomatic anemia, G6PD deficiency.


Narrative Course


IV was established, labs are drawn and sent, and the patient was placed on 

cardiac telemetry monitoring and continuous pulse oximetry monitoring.  Patient 

was administered Dilaudid, Benadryl, Zofran, and IV fluids.  CBC and retake 

count were sent to lab.  CBC reveals a hemoglobin 8.6, retake count is 

appropriate.  The patient was administered several doses of pain medications, 

his symptoms did improve.  The patient be discharged home on pain medications.  

The patient is advised to follow-up with his hematologist.  Return if symptoms 

worsen or progress.





Diagnosis





 Primary Impression:  


 Vaso-occlusive sickle cell crisis


Patient Instructions:  General Instructions





***Additional Instructions:


Medications as directed.  Follow-up with her primary physician.  Return if 

symptoms worsen or progress.


***Med/Other Pt SpecificInfo:  Prescription(s) given


Scripts


Hydrocodone-Acetaminophen (Norco) Mg Tab1 Tab PO Q6H PRN (PAIN) #20 TAB  

Ref 0


   Prov:Valdemar Pat MD         4/4/17


Disposition:  01 DISCHARGE HOME


Condition:  Stable








Valdemar Pat MD Apr 4, 2017 17:16

## 2017-04-13 ENCOUNTER — HOSPITAL ENCOUNTER (EMERGENCY)
Dept: HOSPITAL 17 - NEPE | Age: 22
Discharge: HOME | End: 2017-04-13
Payer: COMMERCIAL

## 2017-04-13 ENCOUNTER — HOSPITAL ENCOUNTER (EMERGENCY)
Dept: HOSPITAL 17 - NEPD | Age: 22
Discharge: HOME | End: 2017-04-13
Payer: COMMERCIAL

## 2017-04-13 VITALS
OXYGEN SATURATION: 100 % | SYSTOLIC BLOOD PRESSURE: 137 MMHG | DIASTOLIC BLOOD PRESSURE: 89 MMHG | RESPIRATION RATE: 16 BRPM | HEART RATE: 77 BPM

## 2017-04-13 VITALS — RESPIRATION RATE: 17 BRPM

## 2017-04-13 VITALS — HEIGHT: 67 IN | BODY MASS INDEX: 22.49 KG/M2 | WEIGHT: 143.3 LBS

## 2017-04-13 VITALS
HEART RATE: 75 BPM | DIASTOLIC BLOOD PRESSURE: 75 MMHG | RESPIRATION RATE: 18 BRPM | SYSTOLIC BLOOD PRESSURE: 122 MMHG | OXYGEN SATURATION: 97 %

## 2017-04-13 VITALS
DIASTOLIC BLOOD PRESSURE: 74 MMHG | OXYGEN SATURATION: 95 % | RESPIRATION RATE: 28 BRPM | HEART RATE: 120 BPM | TEMPERATURE: 97.9 F | SYSTOLIC BLOOD PRESSURE: 144 MMHG

## 2017-04-13 VITALS
DIASTOLIC BLOOD PRESSURE: 81 MMHG | TEMPERATURE: 97.7 F | SYSTOLIC BLOOD PRESSURE: 160 MMHG | HEART RATE: 112 BPM | RESPIRATION RATE: 18 BRPM | OXYGEN SATURATION: 100 %

## 2017-04-13 VITALS — HEIGHT: 67 IN | BODY MASS INDEX: 22.15 KG/M2 | WEIGHT: 141.1 LBS

## 2017-04-13 VITALS
HEART RATE: 100 BPM | DIASTOLIC BLOOD PRESSURE: 57 MMHG | RESPIRATION RATE: 21 BRPM | SYSTOLIC BLOOD PRESSURE: 107 MMHG | OXYGEN SATURATION: 98 %

## 2017-04-13 VITALS
SYSTOLIC BLOOD PRESSURE: 127 MMHG | HEART RATE: 74 BPM | OXYGEN SATURATION: 99 % | RESPIRATION RATE: 18 BRPM | DIASTOLIC BLOOD PRESSURE: 74 MMHG

## 2017-04-13 VITALS — DIASTOLIC BLOOD PRESSURE: 78 MMHG | SYSTOLIC BLOOD PRESSURE: 120 MMHG

## 2017-04-13 DIAGNOSIS — D57.00: Primary | ICD-10-CM

## 2017-04-13 DIAGNOSIS — D56.1: Primary | ICD-10-CM

## 2017-04-13 DIAGNOSIS — E87.1: ICD-10-CM

## 2017-04-13 DIAGNOSIS — D57.00: ICD-10-CM

## 2017-04-13 LAB
ALP SERPL-CCNC: 66 U/L (ref 45–117)
ALT SERPL-CCNC: 23 U/L (ref 12–78)
ANION GAP SERPL CALC-SCNC: 10 MEQ/L (ref 5–15)
AST SERPL-CCNC: 36 U/L (ref 15–37)
BASOPHILS # BLD AUTO: 0 TH/MM3 (ref 0–0.2)
BASOPHILS NFR BLD: 0.2 % (ref 0–2)
BILIRUB SERPL-MCNC: 3.3 MG/DL (ref 0.2–1)
BUN SERPL-MCNC: 9 MG/DL (ref 7–18)
CHLORIDE SERPL-SCNC: 109 MEQ/L (ref 98–107)
EOSINOPHIL # BLD: 0.3 TH/MM3 (ref 0–0.4)
EOSINOPHIL NFR BLD: 3 % (ref 0–4)
EOSINOPHIL NFR BLD: 3 % (ref 0–4)
ERYTHROCYTE [DISTWIDTH] IN BLOOD BY AUTOMATED COUNT: 16.3 % (ref 11.6–17.2)
GFR SERPLBLD BASED ON 1.73 SQ M-ARVRAT: 191 ML/MIN (ref 89–?)
HCO3 BLD-SCNC: 21.9 MEQ/L (ref 21–32)
HCT VFR BLD CALC: 24.2 % (ref 39–51)
HEMO FLAGS: (no result)
HGB S BLD QL SOLY: (no result)
HOWELL-JOLLY BOD BLD QL SMEAR: PRESENT
LYMPHOCYTES # BLD AUTO: 3.6 TH/MM3 (ref 1–4.8)
LYMPHOCYTES NFR BLD AUTO: 32.7 % (ref 9–44)
MCH RBC QN AUTO: 25.8 PG (ref 27–34)
MCHC RBC AUTO-ENTMCNC: 33.8 % (ref 32–36)
MCV RBC AUTO: 76.4 FL (ref 80–100)
MONOCYTES NFR BLD: 8.3 % (ref 0–8)
NEUTROPHILS # BLD AUTO: 6.1 TH/MM3 (ref 1.8–7.7)
NEUTROPHILS NFR BLD AUTO: 55.8 % (ref 16–70)
NEUTS BAND # BLD MANUAL: 5.4 TH/MM3 (ref 1.8–7.7)
NEUTS BAND NFR BLD: 1 % (ref 0–6)
NEUTS SEG NFR BLD MANUAL: 48 % (ref 16–70)
PLAT MORPH BLD: NORMAL
PLATELET # BLD: 241 TH/MM3 (ref 150–450)
PLATELET BLD QL SMEAR: NORMAL
POLYCHROMASIA BLD QL SMEAR: 2.5 % (ref 0–1.9)
POTASSIUM SERPL-SCNC: 3.6 MEQ/L (ref 3.5–5.1)
RBC # BLD AUTO: 3.17 MIL/MM3 (ref 4.5–5.9)
RETICS/RBC NFR: 6.9 % (ref 0.4–3)
REVIEW FLAG: (no result)
SCAN/DIFF: (no result)
SODIUM SERPL-SCNC: 141 MEQ/L (ref 136–145)
TARGETS BLD QL SMEAR: (no result)
WBC # BLD AUTO: 11 TH/MM3 (ref 4–11)
WBC DIFF SAMPLE: 100
WBC NRBC COR # BLD: 4 /100 WBC (ref 0–0)

## 2017-04-13 PROCEDURE — 96374 THER/PROPH/DIAG INJ IV PUSH: CPT

## 2017-04-13 PROCEDURE — 85027 COMPLETE CBC AUTOMATED: CPT

## 2017-04-13 PROCEDURE — 80053 COMPREHEN METABOLIC PANEL: CPT

## 2017-04-13 PROCEDURE — 96376 TX/PRO/DX INJ SAME DRUG ADON: CPT

## 2017-04-13 PROCEDURE — 99283 EMERGENCY DEPT VISIT LOW MDM: CPT

## 2017-04-13 PROCEDURE — 85007 BL SMEAR W/DIFF WBC COUNT: CPT

## 2017-04-13 PROCEDURE — 99284 EMERGENCY DEPT VISIT MOD MDM: CPT

## 2017-04-13 PROCEDURE — 96361 HYDRATE IV INFUSION ADD-ON: CPT

## 2017-04-13 PROCEDURE — 96375 TX/PRO/DX INJ NEW DRUG ADDON: CPT

## 2017-04-13 PROCEDURE — 85044 MANUAL RETICULOCYTE COUNT: CPT

## 2017-04-13 NOTE — PD
HPI


Chief Complaint:  Sickle Cell


Time Seen by Provider:  06:19


Travel History


International Travel<30 days:  No


Contact w/Intl Traveler<30days:  No


Traveled to known affect area:  No





History of Present Illness


HPI


Patient is a 21-year-old  male who presents to ER with c/o of 

sickle cell crisis.  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 

with Dr. Glover with hematolgy.  Patient reports that around 11 PM last night

, he began to have his typical sickle cell crisis which consists of low back 

pain, pains to his legs and his heels.  Patient reports that his pain is 

similar to his previous episodes of sickle cell crisis in the past.  Patient 

reports no fevers or chills, denies any chest pain or shortness of breath at 

this time.  Patient denies any abdominal pain, nausea or vomiting.





PFSH


Past Medical History


Hx Anticoagulant Therapy:  No


Anemia:  Yes


Arthritis:  No


Asthma:  No


Autoimmune Disease:  No


Blood Disorders:  Yes ( G6PD DEFICIENCY)


Anxiety:  No


Depression:  No


Heart Rhythm Problems:  No


Cancer:  No


High Cholesterol:  No


Chemotherapy:  No


Chest Pain:  Yes


Congestive Heart Failure:  No


COPD:  No


Cerebrovascular Accident:  No


Diabetes:  No


Diminished Hearing:  No


Endocrine:  No


Gastrointestinal Disorders:  Yes (gall stones)


GERD:  No


Genitourinary:  No


Hiatal Hernia:  No


Immune Disorder:  Yes (SICKLE CELL)


Implanted Vascular Access Dvce:  No


Kidney Stones:  No


Musculoskeletal:  No


Neurologic:  No


Psychiatric:  Yes


Reproductive:  No


Respiratory:  No


Immunizations Current:  Yes


Migraines:  No


Pneumonia:  Yes


Radiation Therapy:  No


Renal Failure:  No


Seizures:  No


Sickle Cell Disease:  Yes


Sleep Apnea:  No


Thyroid Disease:  No


Ulcer:  No


Influenza Vaccination:  No





Past Surgical History


Abdominal Surgery:  No


AICD:  No


Arteriovenous Shunt:  No


Cardiac Surgery:  No


Ear Surgery:  No


Endocrine Surgery:  No


Eye Surgery:  No


Genitourinary Surgery:  No


Gynecologic Surgery:  No


Hysterectomy:  No


Insulin Pump:  No


Joint Replacement:  No


Oral Surgery:  Yes (dental surgery "AS A SMALL CHILD")


Pacemaker:  No


Thoracic Surgery:  No


Other Surgery:  Yes (PICC LINE /picc line removal)





Social History


Alcohol Use:  No


Tobacco Use:  No


Substance Use:  No





Allergies-Medications


(Allergen,Severity, Reaction):  


Coded Allergies:  


     Sulfa (Verified  Allergy, Intermediate, 4/13/17)


     Morphine (Verified  Allergy, Unknown, 4/13/17)


Uncoded Allergies:  


     LEONARD BEANS (Allergy, Intermediate, 10/10/14)


Reported Meds & Prescriptions





Reported Meds & Active Scripts


Active


Reported


Hydroxyurea 500 Mg Cap 500 Mg PO DAILY


Folic Acid 400 Mcg Tab 400 Mcg PO DAILY








Review of Systems


General / Constitutional:  No: Fever


Eyes:  No: Visual changes


HENT:  No: Headaches


Cardiovascular:  No: Chest Pain or Discomfort


Respiratory:  No: Shortness of Breath


Gastrointestinal:  No: Abdominal Pain


Genitourinary:  No: Dysuria


Musculoskeletal:  Positive: Myalgias, Pain


Skin:  No Rash


Neurologic:  No: Weakness


Psychiatric:  No: Depression


Endocrine:  No: Polydipsia


Hematologic/Lymphatic:  No: Easy Bruising





Physical Exam


Narrative


GENERAL: moderate distress


SKIN: Focused skin assessment warm/dry.


HEAD: Atraumatic. Normocephalic. 


EYES: Pupils equal and round. No scleral icterus. No injection or drainage. 


ENT: No nasal bleeding or discharge.  Mucous membranes pink and moist.


NECK: Trachea midline. No JVD. 


CARDIOVASCULAR: Regular rate and rhythm.  No murmur appreciated.


RESPIRATORY: No accessory muscle use. Clear to auscultation. Breath sounds 

equal bilaterally. 


GASTROINTESTINAL: Abdomen soft, non-tender, nondistended. Hepatic and splenic 

margins not palpable. 


MUSCULOSKELETAL: No obvious deformities. No clubbing.  No cyanosis.  No edema. 


NEUROLOGICAL: Awake and alert.Motor grossly within normal limits. Normal speech.





Data


Data


Last Documented VS





Vital Signs








  Date Time  Temp Pulse Resp B/P Pulse Ox O2 Delivery O2 Flow Rate FiO2


 


4/13/17 06:15  77 16 137/89 100 Room Air  


 


4/13/17 05:59 97.7       








Orders





 Complete Blood Count With Diff (4/13/17 06:19)


Comprehensive Metabolic Panel (4/13/17 06:19)


Retic Count (4/13/17 06:19)


Urinalysis - C+S If Indicated (4/13/17 06:19)


Iv Access Insert/Monitor (4/13/17 06:19)


Sodium Chloride 0.9% Flush (Ns Flush) (4/13/17 06:30)


Sodium Chlor 0.9% 1000 Ml Inj (Ns 1000 M (4/13/17 06:19)


Ondansetron Inj (Zofran Inj) (4/13/17 06:30)


Sodium Chlor 0.9% 1000 Ml Inj (Ns 1000 M (4/13/17 06:23)


Hydromorphone Pf Inj (Dilaudid Pf Inj) (4/13/17 06:30)


Diphenhydramine Inj (Benadryl Inj) (4/13/17 06:30)








MDM


Medical Decision Making


Medical Screen Exam Complete:  Yes


Emergency Medical Condition:  Yes


Interpretation(s)





Vital Signs








  Date Time  Temp Pulse Resp B/P Pulse Ox O2 Delivery O2 Flow Rate FiO2


 


4/13/17 06:15  77 16 137/89 100 Room Air  


 


4/13/17 05:59 97.7 112 18 160/81 100 Room Air  








Differential Diagnosis


Vaso-occlusive crisis, chronic pain, anemia, dehydration


Narrative Course


21-year-old male who presents to emergency room with complaints of sickle cell 

crisis.  Reports that his symptoms are typical of his normal sickle cell 

disease.





An IV line established upon presentation to the emergency room, labs including 

retic count ordered.  Patient was administered Dilaudid, Zofran, IV fluids as 

well as Benadryl.  Plan to observe patient





Patient signed out to oncoming physician at shift change








Sarah Munoz DO Apr 13, 2017 06:45

## 2017-04-13 NOTE — PD
Physical Exam


Date Seen by Provider:  Apr 13, 2017


Time Seen by Provider:  08:14


Narrative


21-year-old male came to the emergency room with history of sickle cell crisis.

  He has had ER visits multiple times for the same complain.  He was seen by 

the previous ER physician who started a workup and gave him IV Dilaudid and 2 L 

of IV fluid bolus.  Case was signed over to me to follow-up on the blood test 

result.  Patient has slight anemia which is not much different from his 

previous anemia.  Also his retic count is elevated which is again not much 

different from his past.  He told the nurse that he still in pain and I ordered 

4 mg of morphine which even though it has been mentioned as an allergy.  After 

receiving the morphine patient has not had any allergic reaction.  I will 

delete morphine from his list of allergies.





Data


Data


Last Documented VS





Orders





 Complete Blood Count With Diff (4/13/17 06:19)


Comprehensive Metabolic Panel (4/13/17 06:19)


Retic Count (4/13/17 06:19)


Iv Access Insert/Monitor (4/13/17 06:19)


Sodium Chloride 0.9% Flush (Ns Flush) (4/13/17 06:30)


Sodium Chlor 0.9% 1000 Ml Inj (Ns 1000 M (4/13/17 06:19)


Ondansetron Inj (Zofran Inj) (4/13/17 06:30)


Sodium Chlor 0.9% 1000 Ml Inj (Ns 1000 M (4/13/17 06:23)


Hydromorphone Pf Inj (Dilaudid Pf Inj) (4/13/17 06:30)


Diphenhydramine Inj (Benadryl Inj) (4/13/17 06:30)


Morphine Inj (Morphine Inj) (4/13/17 07:30)


Morphine Inj (Morphine Inj) (4/13/17 08:45)





Labs








MDM


Supervised Visit with EDA:  No


Diagnosis





 Primary Impression:  


 Sickle cell pain crisis


Referrals:  


Primary Care Physician


2 days





***Additional Instruction:


Please return to the ER if the condition worsens or any other new concerns.  

Follow-up with a hematologist.  Take folate acid like is supposed to.


***Med/Other Pt SpecificInfo:  No Change to Meds


Disposition:  01 DISCHARGE HOME


Condition:  Stable








Sara Young MD Apr 13, 2017 07:30


 


Hemoglobin 8.2 GM/DL


 


Hematocrit 24.2 %


 


Mean Corpuscular Volume 76.4 FL


 


Mean Corpuscular Hemoglobin 25.8 PG


 


Mean Corpuscular Hemoglobin 33.8 %





Concent 


 


Red Cell Distribution Width 16.3 %


 


Platelet Count 241 TH/MM3


 


Mean Platelet Volume 8.2 FL


 


Neutrophils (%) (Auto) 55.8 %


 


Lymphocytes (%) (Auto) 32.7 %


 


Monocytes (%) (Auto) 8.3 %


 


Eosinophils (%) (Auto) 3.0 %


 


Basophils (%) (Auto) 0.2 %


 


Neutrophils # (Auto) 6.1 TH/MM3


 


Lymphocytes # (Auto) 3.6 TH/MM3


 


Monocytes # (Auto) 0.9 TH/MM3


 


Eosinophils # (Auto) 0.3 TH/MM3


 


Basophils # (Auto) 0.0 TH/MM3


 


CBC Comment AUTO DIFF 


 


Reticulocyte Count 6.9 %


 


Absolute Reticulocyte Count 217.5 MIL/L











MDM


Supervised Visit with EDA:  No


Diagnosis





 Primary Impression:  


 Sickle cell pain crisis


Referrals:  


Primary Care Physician


2 days





***Additional Instruction:


Please return to the ER if the condition worsens or any other new concerns.  

Follow-up with a hematologist.  Take folate acid like is supposed to.


***Med/Other Pt SpecificInfo:  No Change to Meds


Disposition:  01 DISCHARGE HOME


Condition:  Stable








Sara Young MD Apr 13, 2017 07:30

## 2017-04-13 NOTE — PD
HPI


Chief Complaint:  Sickle Cell


Time Seen by Provider:  09:39


Travel History


International Travel<30 days:  No


Contact w/Intl Traveler<30days:  No


Traveled to known affect area:  No





History of Present Illness


HPI


The 21-year-old male with history of sickle cell disease (sickle cell/beta 

thalassemia) with monthly exacerbations of chronic pain who presents to the 

emergency department with the same.  He states pain started getting that about 

midnight last night, was worse around 3 AM so he came to the emergency 

department.  Patient was just discharged when he felt his pain was still 

uncontrolled and so he checked in again to the emergency department.  States 

she otherwise had been feeling generally well.  Denies any recent illness or 

injury.  No trouble breathing.  No fevers or chills.  No abdominal pain.  No 

other complaints.  States it feels similar to his previous vaso-occlusive pain 

crises.





History


Past Medical History


*** Narrative Medical


Sickle cell/beta thalassemia


G6 PD


Transfusion associated hemosiderosis





Social History


Alcohol Use:  No


Tobacco Use:  No





Allergies-Medications


(Allergen,Severity, Reaction):  


Coded Allergies:  


     Sulfa (Verified  Allergy, Intermediate, 4/13/17)


     Morphine (Verified  Adverse Reaction, Mild, 4/13/17)


 states it doesn't work and causes him to itch


Uncoded Allergies:  


     LEONARD BEANS (Allergy, Intermediate, 10/10/14)


Reported Meds & Prescriptions





Reported Meds & Active Scripts


Active


Reported


Hydroxyurea 500 Mg Cap 500 Mg PO DAILY


Folic Acid 400 Mcg Tab 400 Mcg PO DAILY








Review of Systems


Except as stated in HPI:  all other systems reviewed are Neg





Physical Exam


Narrative


GENERAL: 20-year-old man, appears uncomfortable, nontoxic.


SKIN: Focused skin assessment warm/dry.


CARDIOVASCULAR: Regular rate and rhythm.  No murmur appreciated.


RESPIRATORY: No accessory muscle use. Clear to auscultation. Breath sounds 

equal bilaterally. 


GASTROINTESTINAL: Abdomen soft, non-tender, nondistended. Hepatic and splenic 

margins not palpable. 


MUSCULOSKELETAL: No obvious deformities.  No edema.


NEUROLOGICAL: Awake and alert. No obvious cranial nerve deficits.  Motor 

grossly within normal limits. Normal speech.


PSYCHIATRIC: Appropriate mood and affect; insight and judgment normal.





Data


Data


Last Documented VS





Vital Signs








  Date Time  Temp Pulse Resp B/P Pulse Ox O2 Delivery O2 Flow Rate FiO2


 


4/13/17 10:58  74 18 127/74 99 Room Air  


 


4/13/17 09:22 97.9       








Orders





 Hydromorphone Pf Inj (Dilaudid Pf Inj) (4/13/17 10:00)


Sodium Chlor 0.9% 1000 Ml Inj (Ns 1000 M (4/13/17 10:00)


Hydromorphone Pf Inj (Dilaudid Pf Inj) (4/13/17 11:00)








[facility]


Medical Decision Making


Medical Screen Exam Complete:  Yes


Emergency Medical Condition:  Yes


Differential Diagnosis


Vaso-occlusive pain crisis, dehydration, occult infection, acute chest syndrome

, other


Narrative Course


Medical decision making





21-year-old man who presents to the emergency department complaining of ongoing 

vaso-occlusive pain crises.  He received 1 of Dilaudid and 4 morphine overnight 

previous ER physician.  Review of records shows Dr. Glover in the office gave 

him 4 mg of IV Dilaudid. We'll give patient pain medicine, IV fluids, reassess.

  Possible admission.





11:25 AM: Went to go speak with patient.  We had received a second dose of 2 mg 

of IV Dilaudid.  He states he feels like he is on top of his pain now.  I 

offered him admission to the hospital.  Patient declines states he rather try 

to go back home.  He needs prescription refills on his medications.  He 

understands he can return at any time.





Diagnosis





 Primary Impression:  


 Sickle cell disease with crisis





***Additional Instructions:


Take Percocet as prescribed.





Follow-up with Dr. Glover.





Return to the emergency department for new or worsening symptoms.


***Med/Other Pt SpecificInfo:  Prescription(s) given


Scripts


Hydrocodone-Acetaminophen (Norco) Mg Tab1 Tab PO Q6H PRN (PAIN) #20 TAB  

Ref 0


   Prov:Fadi Holguin MD         4/13/17


Disposition:  01 DISCHARGE HOME


Condition:  Stable








Fadi Holguin MD Apr 13, 2017 10:17

## 2017-05-06 ENCOUNTER — HOSPITAL ENCOUNTER (EMERGENCY)
Dept: HOSPITAL 17 - NEPD | Age: 22
Discharge: HOME | End: 2017-05-06
Payer: COMMERCIAL

## 2017-05-06 VITALS
OXYGEN SATURATION: 99 % | SYSTOLIC BLOOD PRESSURE: 109 MMHG | HEART RATE: 88 BPM | RESPIRATION RATE: 16 BRPM | DIASTOLIC BLOOD PRESSURE: 58 MMHG

## 2017-05-06 VITALS
TEMPERATURE: 99 F | OXYGEN SATURATION: 98 % | HEART RATE: 86 BPM | SYSTOLIC BLOOD PRESSURE: 120 MMHG | RESPIRATION RATE: 24 BRPM | DIASTOLIC BLOOD PRESSURE: 60 MMHG

## 2017-05-06 VITALS — WEIGHT: 130.07 LBS | HEIGHT: 66 IN | BODY MASS INDEX: 20.9 KG/M2

## 2017-05-06 DIAGNOSIS — R07.9: ICD-10-CM

## 2017-05-06 DIAGNOSIS — D57.00: Primary | ICD-10-CM

## 2017-05-06 LAB
ANION GAP SERPL CALC-SCNC: 8 MEQ/L (ref 5–15)
BASOPHILS # BLD AUTO: 0.1 TH/MM3 (ref 0–0.2)
BASOPHILS NFR BLD: 1 % (ref 0–2)
BUN SERPL-MCNC: 6 MG/DL (ref 7–18)
CHLORIDE SERPL-SCNC: 108 MEQ/L (ref 98–107)
EOSINOPHIL # BLD: 0.3 TH/MM3 (ref 0–0.4)
EOSINOPHIL NFR BLD: 2 % (ref 0–4)
ERYTHROCYTE [DISTWIDTH] IN BLOOD BY AUTOMATED COUNT: 17.4 % (ref 11.6–17.2)
GFR SERPLBLD BASED ON 1.73 SQ M-ARVRAT: 206 ML/MIN (ref 89–?)
HCO3 BLD-SCNC: 25.9 MEQ/L (ref 21–32)
HCT VFR BLD CALC: 20.9 % (ref 39–51)
HEMO FLAGS: (no result)
HGB S BLD QL SOLY: (no result)
HOWELL-JOLLY BOD BLD QL SMEAR: PRESENT
LYMPHOCYTES # BLD AUTO: 3.1 TH/MM3 (ref 1–4.8)
LYMPHOCYTES NFR BLD AUTO: 23.1 % (ref 9–44)
MCH RBC QN AUTO: 27 PG (ref 27–34)
MCHC RBC AUTO-ENTMCNC: 34.9 % (ref 32–36)
MCV RBC AUTO: 77.4 FL (ref 80–100)
MONOCYTES NFR BLD: 6.7 % (ref 0–8)
NEUTROPHILS # BLD AUTO: 9 TH/MM3 (ref 1.8–7.7)
NEUTROPHILS NFR BLD AUTO: 67.2 % (ref 16–70)
PLATELET # BLD: 248 TH/MM3 (ref 150–450)
POLYCHROMASIA BLD QL SMEAR: 2.4 % (ref 0–1.9)
POTASSIUM SERPL-SCNC: 4.1 MEQ/L (ref 3.5–5.1)
RBC # BLD AUTO: 2.7 MIL/MM3 (ref 4.5–5.9)
RETICS/RBC NFR: 10.5 % (ref 0.4–3)
REVIEW FLAG: (no result)
SCAN/DIFF: (no result)
SODIUM SERPL-SCNC: 142 MEQ/L (ref 136–145)
WBC # BLD AUTO: 13.4 TH/MM3 (ref 4–11)

## 2017-05-06 PROCEDURE — 71010: CPT

## 2017-05-06 PROCEDURE — 93005 ELECTROCARDIOGRAM TRACING: CPT

## 2017-05-06 PROCEDURE — 85044 MANUAL RETICULOCYTE COUNT: CPT

## 2017-05-06 PROCEDURE — 96361 HYDRATE IV INFUSION ADD-ON: CPT

## 2017-05-06 PROCEDURE — 96376 TX/PRO/DX INJ SAME DRUG ADON: CPT

## 2017-05-06 PROCEDURE — 96374 THER/PROPH/DIAG INJ IV PUSH: CPT

## 2017-05-06 PROCEDURE — 99284 EMERGENCY DEPT VISIT MOD MDM: CPT

## 2017-05-06 PROCEDURE — 96375 TX/PRO/DX INJ NEW DRUG ADDON: CPT

## 2017-05-06 PROCEDURE — 80048 BASIC METABOLIC PNL TOTAL CA: CPT

## 2017-05-06 PROCEDURE — 86140 C-REACTIVE PROTEIN: CPT

## 2017-05-06 PROCEDURE — 85025 COMPLETE CBC W/AUTO DIFF WBC: CPT

## 2017-05-06 NOTE — PD
Physical Exam


Date Seen by Provider:  May 6, 2017


Time Seen by Provider:  14:01


Narrative


21 year old male presents to the emergency department reporting sickle cell 

crisis in his chest and lower back since yesterday morning.  





Vital signs reviewed.  Patient waiting bed placement.





Data


Data


Last Documented VS





Vital Signs








  Date Time  Temp Pulse Resp B/P Pulse Ox O2 Delivery O2 Flow Rate FiO2


 


5/6/17 13:58 99.0 86 24 120/60 98 Room Air  











MDM


Supervised Visit with EDA:  Brandi Jones May 6, 2017 14:02

## 2017-05-06 NOTE — PD
HPI


Chief Complaint:  Sickle Cell


Time Seen by Provider:  14:15


Travel History


International Travel<30 days:  No


Contact w/Intl Traveler<30days:  No


Traveled to known affect area:  No





History of Present Illness


HPI


21-year-old male with history of sickle cell disorder, presents to the ER today 

because he states that 2 days ago he started having a sickle cell crisis with 

pains in both legs, and today's having upper back and chest pains but she 

currently rates it a 6 out of 10.  He states he feels is his usual crisis.  He 

denies any shortness of breath, coughing, fevers, vomiting, or any other 

symptoms.  He had taken his own Dilaudid at home without significant 

improvement.





Modifying Factors: None


Associated Signs & Symptoms: Chest pain, back pain, bilateral leg pains, sickle 

cell crisis


Risk Factors: History of sickle cell





PFSH


Past Medical History


Hx Anticoagulant Therapy:  No


Anemia:  Yes


Arthritis:  No


Asthma:  No


Autoimmune Disease:  No


Blood Disorders:  Yes ( G6PD DEFICIENCY)


Anxiety:  No


Depression:  No


Heart Rhythm Problems:  No


Cancer:  No


High Cholesterol:  No


Chemotherapy:  No


Chest Pain:  Yes


Congestive Heart Failure:  No


COPD:  No


Cerebrovascular Accident:  No


Diabetes:  No


Diminished Hearing:  No


Endocrine:  No


Gastrointestinal Disorders:  Yes (gall stones)


GERD:  No


Genitourinary:  No


Hiatal Hernia:  No


Immune Disorder:  Yes (SICKLE CELL)


Implanted Vascular Access Dvce:  No


Kidney Stones:  No


Musculoskeletal:  No


Neurologic:  No


Psychiatric:  Yes


Reproductive:  No


Respiratory:  No


Immunizations Current:  Yes


Migraines:  No


Pneumonia:  Yes


Radiation Therapy:  No


Renal Failure:  No


Seizures:  No


Sickle Cell Disease:  Yes


Sleep Apnea:  No


Thyroid Disease:  No


Ulcer:  No





Past Surgical History


Abdominal Surgery:  No


AICD:  No


Arteriovenous Shunt:  No


Cardiac Surgery:  No


Ear Surgery:  No


Endocrine Surgery:  No


Eye Surgery:  No


Genitourinary Surgery:  No


Gynecologic Surgery:  No


Hysterectomy:  No


Insulin Pump:  No


Joint Replacement:  No


Oral Surgery:  Yes (dental surgery "AS A SMALL CHILD")


Pacemaker:  No


Thoracic Surgery:  No


Other Surgery:  Yes (PICC LINE /picc line removal)





Social History


Alcohol Use:  No


Tobacco Use:  No


Substance Use:  No





Allergies-Medications


(Allergen,Severity, Reaction):  


Coded Allergies:  


     Sulfa (Verified  Allergy, Intermediate, 5/6/17)


     Morphine (Verified  Adverse Reaction, Mild, 5/6/17)


 states it doesn't work and causes him to itch


Uncoded Allergies:  


     LEONARD BEANS (Allergy, Intermediate, 10/10/14)


Reported Meds & Prescriptions





Reported Meds & Active Scripts


Active


Norco (Hydrocodone-Acetaminophen)  Mg Tab 1 Tab PO Q6H PRN


Reported


Hydroxyurea 500 Mg Cap 500 Mg PO DAILY


Folic Acid 400 Mcg Tab 400 Mcg PO DAILY








Review of Systems


Except as stated in HPI:  all other systems reviewed are Neg





Physical Exam


Narrative


GENERAL: Well-developed thin young -American male patient currently in 

moderate distress.


SKIN: Focused skin assessment warm/dry.


HEAD: Atraumatic. Normocephalic. 


EYES: Pupils equal and round. No scleral icterus. No injection or drainage. 


ENT: No nasal bleeding or discharge.  Mucous membranes pink and moist.


NECK: Trachea midline. No JVD. 


CARDIOVASCULAR: Regular rate and rhythm.  No murmur appreciated.


RESPIRATORY: No accessory muscle use. Clear to auscultation. Breath sounds 

equal bilaterally. 


GASTROINTESTINAL: Abdomen soft, mild diffuse abdominal tenderness without 

guarding or rebound, nondistended. Hepatic and splenic margins not palpable. 


MUSCULOSKELETAL: No obvious deformities. No clubbing.  No cyanosis.  No edema. 


NEUROLOGICAL: Awake and alert. No obvious cranial nerve deficits.  Motor 

grossly within normal limits. Normal speech.


PSYCHIATRIC: Appropriate mood and affect; insight and judgment normal.





Data


Data


Last Documented VS





Vital Signs








  Date Time  Temp Pulse Resp B/P Pulse Ox O2 Delivery O2 Flow Rate FiO2


 


5/6/17 13:58 99.0 86 24 120/60 98 Room Air  








Orders





 Basic Metabolic Panel (Bmp) (5/6/17 14:12)


C-Reactive Protein (Crp) (5/6/17 14:12)


Complete Blood Count With Diff (5/6/17 14:12)


Retic Count (5/6/17 14:12)


Ecg Monitoring (5/6/17 14:12)


Iv Access Insert/Monitor (5/6/17 14:12)


Oximetry (5/6/17 14:12)


Ondansetron Inj (Zofran Inj) (5/6/17 14:15)


Sodium Chloride 0.9% Flush (Ns Flush) (5/6/17 14:15)


Sodium Chlor 0.9% 1000 Ml Inj (Ns 1000 M (5/6/17 14:12)


Hydromorphone Pf Inj (Dilaudid Pf Inj) (5/6/17 14:15)


Diphenhydramine Inj (Benadryl Inj) (5/6/17 14:15)


Chest, Single Ap (5/6/17 14:15)


Electrocardiogram (5/6/17 14:15)


Hydromorphone Pf Inj (Dilaudid Pf Inj) (5/6/17 15:15)





Labs








 Laboratory Tests








Test 5/6/17





 14:20


 


White Blood Count 13.4 TH/MM3


 


Red Blood Count 2.70 MIL/MM3


 


Hemoglobin 7.3 GM/DL


 


Hematocrit 20.9 %


 


Mean Corpuscular Volume 77.4 FL


 


Mean Corpuscular Hemoglobin 27.0 PG


 


Mean Corpuscular Hemoglobin 34.9 %





Concent 


 


Red Cell Distribution Width 17.4 %


 


Platelet Count 248 TH/MM3


 


Mean Platelet Volume 8.0 FL


 


Neutrophils (%) (Auto) 67.2 %


 


Lymphocytes (%) (Auto) 23.1 %


 


Monocytes (%) (Auto) 6.7 %


 


Eosinophils (%) (Auto) 2.0 %


 


Basophils (%) (Auto) 1.0 %


 


Neutrophils # (Auto) 9.0 TH/MM3


 


Lymphocytes # (Auto) 3.1 TH/MM3


 


Monocytes # (Auto) 0.9 TH/MM3


 


Eosinophils # (Auto) 0.3 TH/MM3


 


Basophils # (Auto) 0.1 TH/MM3


 


CBC Comment AUTO DIFF 


 


Differential Comment AUTO DIFF





 CONFIRMED


 


Polychromasia 2.4 %


 


Sickle Cells 1+ 


 


[place] Bodies PRESENT 


 


Reticulocyte Count 10.5 %


 


Absolute Reticulocyte Count 283.0 MIL/L


 


Sodium Level 142 MEQ/L


 


Potassium Level 4.1 MEQ/L


 


Chloride Level 108 MEQ/L


 


Carbon Dioxide Level 25.9 MEQ/L


 


Anion Gap 8 MEQ/L


 


Blood Urea Nitrogen 6 MG/DL


 


Creatinine 0.59 MG/DL


 


Estimat Glomerular Filtration 206 ML/MIN





Rate 


 


Random Glucose 81 MG/DL


 


Calcium Level 8.9 MG/DL


 


C-Reactive Protein 1.35 MG/DL














[facility]


Medical Decision Making


Medical Screen Exam Complete:  Yes


Emergency Medical Condition:  Yes


Medical Record Reviewed:  Yes


Interpretation(s)


EKG shows NSR, no ST elevation or depression, and no arrhythmias.  No  

significant T-wave inversions.








Last 24 hours Impressions








Chest X-Ray 5/6/17 2210 Signed





Impressions: 





 Service Date/Time:  Saturday, May 6, 2017 14:24 - CONCLUSION: No acute 

disease.  





      Kel Nathan MD 








Laboratory Tests








Test 5/6/17





 14:20


 


White Blood Count 13.4 TH/MM3





 (4.0-11.0)


 


Red Blood Count 2.70 MIL/MM3





 (4.50-5.90)


 


Hemoglobin 7.3 GM/DL





 (13.0-17.0)


 


Hematocrit 20.9 %





 (39.0-51.0)


 


Mean Corpuscular Volume 77.4 FL





 (80.0-100.0)


 


Red Cell Distribution Width 17.4 %





 (11.6-17.2)


 


Neutrophils # (Auto) 9.0 TH/MM3





 (1.8-7.7)


 


Polychromasia 2.4 % (0.0-1.9)


 


Sickle Cells 1+  (NORMAL)


 


Reticulocyte Count 10.5 %





 (0.4-3.0)


 


Absolute Reticulocyte Count 283.0 MIL/L





 (20.0-150.0)


 


Chloride Level 108 MEQ/L





 ()


 


Blood Urea Nitrogen 6 MG/DL (7-18)


 


Creatinine 0.59 MG/DL





 (0.60-1.30)


 


C-Reactive Protein 1.35 MG/DL





 (0.00-0.30)








Differential Diagnosis


Chest, back, lower leg painssickle cell crisis versus metabolic issues versus 

acute chest syndrome versus pneumonia


Narrative Course


Patient was given IV fluids, Dilaudid, Zofran, and Benadryl in the ER.  Lab 

work and EKG and chest x-rays were done.  EKG and chest x-ray did not reveal 

any signs of acute processes.  His hemoglobin is 7 and he usually has a 

baseline of 8.  At this point, I have talked to the patient regarding his 

findings and I suspect that he does have some underlying sickle cell crisis.  I 

have encouraged patient to stay in the hospital for further treatment.  However

, patient states that he thinks he is feeling well enough and wants to go home 

and try to manage as an outpatient.  He has been told about his anemia which is 

a bit worse than baseline as well.  Patient states understanding.  He should 

return for any worsening in symptoms.  Follow-up closely with hematology on 

Monday.  The plan was discussed with the patient and he states he wants to try 

to manage the symptoms on his own as an outpatient.





Diagnosis





 Primary Impression:  


 Sickle cell anemia


 Additional Impression:  


 Sickle cell pain crisis


***Med/Other Pt SpecificInfo:  Prescription(s) given


Scripts


Hydrocodone-Acetaminophen (Lortab)5-325 Mg Tab1-2 Tab PO Q6H PRN (PAIN) #20 TAB

  Ref 0


   Prov:Agata Pratt MD         5/6/17


Disposition:  01 DISCHARGE HOME


Condition:  Stable








Agata Pratt MD May 6, 2017 14:19

## 2017-05-06 NOTE — EKG
Date Performed: 05/06/2017       Time Performed: 15:04:03

 

PTAGE:      21 years

 

EKG:      Sinus rhythm 

 

 NORMAL ECG

 

PREVIOUS TRACING       : 03/09/2017 15.42

 

DOCTOR:   Zena Jack  Interpretating Date/Time  05/06/2017 21:45:46

## 2017-05-06 NOTE — RADRPT
EXAM DATE/TIME:  05/06/2017 14:24 

 

[facility] COMPARISON:     

CHEST SINGLE AP, February 02, 2017, 6:43.

 

                     

INDICATIONS :     

Chest pain

                     

 

MEDICAL HISTORY :     

Sickle Cell disease.          

 

SURGICAL HISTORY :     

None.   

 

ENCOUNTER:     

Initial                                        

 

ACUITY:     

1 day      

 

PAIN SCORE:     

6/10

 

LOCATION:     

Bilateral chest 

 

FINDINGS:     

A single view of the chest demonstrates the lungs to be symmetrically aerated without evidence of mas
s, infiltrate or effusion.  The cardiomediastinal contours are unremarkable.  Osseous structures are 
intact.

 

CONCLUSION:     No acute disease.  

 

 

 

 Kel Nathan MD on May 06, 2017 at 14:46           

Board Certified Radiologist.

 This report was verified electronically.

## 2017-05-12 ENCOUNTER — HOSPITAL ENCOUNTER (EMERGENCY)
Dept: HOSPITAL 17 - NEPD | Age: 22
Discharge: HOME | End: 2017-05-12
Payer: COMMERCIAL

## 2017-05-12 VITALS — HEIGHT: 66 IN | WEIGHT: 132.28 LBS | BODY MASS INDEX: 21.26 KG/M2

## 2017-05-12 VITALS
SYSTOLIC BLOOD PRESSURE: 127 MMHG | RESPIRATION RATE: 20 BRPM | HEART RATE: 76 BPM | OXYGEN SATURATION: 95 % | DIASTOLIC BLOOD PRESSURE: 80 MMHG | TEMPERATURE: 98 F

## 2017-05-12 VITALS — SYSTOLIC BLOOD PRESSURE: 122 MMHG | TEMPERATURE: 97.8 F | DIASTOLIC BLOOD PRESSURE: 77 MMHG

## 2017-05-12 VITALS — RESPIRATION RATE: 16 BRPM

## 2017-05-12 DIAGNOSIS — D57.00: Primary | ICD-10-CM

## 2017-05-12 PROCEDURE — 96375 TX/PRO/DX INJ NEW DRUG ADDON: CPT

## 2017-05-12 PROCEDURE — 96361 HYDRATE IV INFUSION ADD-ON: CPT

## 2017-05-12 PROCEDURE — 99283 EMERGENCY DEPT VISIT LOW MDM: CPT

## 2017-05-12 PROCEDURE — 96374 THER/PROPH/DIAG INJ IV PUSH: CPT

## 2017-05-12 NOTE — PD
Data


Data


Last Documented VS





Vital Signs








  Date Time  Temp Pulse Resp B/P Pulse Ox O2 Delivery O2 Flow Rate FiO2


 


5/12/17 16:58   17  99 Room Air  


 


5/12/17 16:45 98.0 76  127/80    








Orders





 Sodium Chlor 0.9% 1000 Ml Inj (Ns 1000 M (5/12/17 17:15)


Ondansetron Inj (Zofran Inj) (5/12/17 17:15)


Hydromorphone Pf Inj (Dilaudid Pf Inj) (5/12/17 17:15)








MDM


Supervised Visit with EDA:  Yes


Narrative Course


I, Dr. Reardon, have reviewed the advance practice practioner's documentation and 

am in agreement, met with the patient face to face, made the diagnosis, and the 

medical decision making was done by me.  





*My assessment and Findings: 21-year-old male with history of sickle cell well-

known to myself and our emergency department here with complaint of bilateral 

arm, leg, low back pain 2 days consistent with his typical sickle cell pain.  

No chest pain, shortness breath, fevers, chills.  Patient well-appearing, he 

recently had laboratory work testing in our emergency department and does not 

warrant this to be repeated.  No signs of acute chest syndrome.  Differential 

includes chronic pain syndrome, sickle cell vaso-occlusive pain crisis, drug-

seeking behavior.  Patient given IV fluids, Zofran, Dilaudid with improvement 

of his symptoms and discharged home.


Diagnosis





 Primary Impression:  


 Sickle cell pain crisis


Referrals:  


Deanna Glover MD


call for appointment


Patient Instructions:  General Instructions, Sickle Cell Crisis (ED)





***Additional Instruction:


Follow up with Dr. Glover.


Return to the emergency department for any acute, worsening of symptoms.


Disposition:  01 DISCHARGE HOME


Condition:  Stable








Kelly Reardon MD May 12, 2017 17:46

## 2017-05-12 NOTE — PD
HPI


Chief Complaint:  Sickle Cell


Time Seen by Provider:  17:09


Travel History


International Travel<30 days:  No


Contact w/Intl Traveler<30days:  No


Traveled to known affect area:  No





History of Present Illness


HPI


21 year old male presents to the emergency department for evaluation of sickle 

cell crisis.  Patient states he started with bilateral arm and leg pain as well 

as back pain 2 days ago.  This is consistent with his typical sickle cell 

crisis.  Patient denies any chest pain or shortness of breath.  No abdominal 

pain.  No nausea or vomiting.  No fevers or chills.  His hematologist is Dr. Glover.  Patient denies any other complaints at this time.





PFSH


Past Medical History


Hx Anticoagulant Therapy:  No


Anemia:  Yes


Arthritis:  No


Asthma:  No


Autoimmune Disease:  No


Blood Disorders:  Yes ( G6PD DEFICIENCY)


Anxiety:  No


Depression:  No


Heart Rhythm Problems:  No


Cancer:  No


High Cholesterol:  No


Chemotherapy:  No


Chest Pain:  Yes


Congestive Heart Failure:  No


COPD:  No


Cerebrovascular Accident:  No


Diabetes:  No


Diminished Hearing:  No


Endocrine:  No


Gastrointestinal Disorders:  Yes (gall stones)


GERD:  No


Genitourinary:  No


Hiatal Hernia:  No


Immune Disorder:  Yes (SICKLE CELL)


Implanted Vascular Access Dvce:  No


Kidney Stones:  No


Musculoskeletal:  No


Neurologic:  No


Psychiatric:  Yes


Reproductive:  No


Respiratory:  No


Immunizations Current:  Yes


Migraines:  No


Pneumonia:  Yes


Radiation Therapy:  No


Renal Failure:  No


Seizures:  No


Sickle Cell Disease:  Yes


Sleep Apnea:  No


Thyroid Disease:  No


Ulcer:  No


Tetanus Vaccination:  < 5 Years


Influenza Vaccination:  Yes





Past Surgical History


Abdominal Surgery:  No


AICD:  No


Arteriovenous Shunt:  No


Cardiac Surgery:  No


Ear Surgery:  No


Endocrine Surgery:  No


Eye Surgery:  No


Genitourinary Surgery:  No


Gynecologic Surgery:  No


Hysterectomy:  No


Insulin Pump:  No


Joint Replacement:  No


Oral Surgery:  Yes (dental surgery "AS A SMALL CHILD")


Pacemaker:  No


Thoracic Surgery:  No


Other Surgery:  Yes (PICC LINE /picc line removal)





Social History


Alcohol Use:  No


Tobacco Use:  No


Substance Use:  No





Allergies-Medications


(Allergen,Severity, Reaction):  


Coded Allergies:  


     Sulfa (Verified  Allergy, Intermediate, itching, 5/12/17)


     Morphine (Verified  Adverse Reaction, Mild, itching, 5/12/17)


 states it doesn't work and causes him to itch


Uncoded Allergies:  


     LEONARD BEANS (Allergy, Intermediate, 10/10/14)


Reported Meds & Prescriptions





Reported Meds & Active Scripts


Active


Reported


Hydroxyurea 500 Mg Cap 500 Mg PO DAILY


Folic Acid 400 Mcg Tab 400 Mcg PO DAILY








Review of Systems


Except as stated in HPI:  all other systems reviewed are Neg





Physical Exam


Narrative


GENERAL: Well-nourished, well-developed male patient, ambulatory.  Afebrile.


SKIN: Focused skin assessment warm/dry.


HEAD: Normocephalic.  Atraumatic.


EYES: No scleral icterus. No injection or drainage. 


NECK: Supple, trachea midline. No JVD or lymphadenopathy.


CARDIOVASCULAR: Regular rate and rhythm without murmurs, gallops, or rubs. 


RESPIRATORY: Breath sounds equal bilaterally. No accessory muscle use.  Lungs 

sounds are clear to auscultation


GASTROINTESTINAL: Abdomen soft, non-tender, nondistended. 


MUSCULOSKELETAL: No cyanosis, or edema. 


BACK: Nontender without obvious deformity. No CVA tenderness.





Data


Data


Last Documented VS





Vital Signs








  Date Time  Temp Pulse Resp B/P Pulse Ox O2 Delivery O2 Flow Rate FiO2


 


5/12/17 16:58   17  99 Room Air  


 


5/12/17 16:45 98.0 76  127/80    








Orders





 Sodium Chlor 0.9% 1000 Ml Inj (Ns 1000 M (5/12/17 17:15)


Ondansetron Inj (Zofran Inj) (5/12/17 17:15)


Hydromorphone Pf Inj (Dilaudid Pf Inj) (5/12/17 17:15)








[facility]


Medical Decision Making


Medical Screen Exam Complete:  Yes


Emergency Medical Condition:  Yes


Medical Record Reviewed:  Yes


Differential Diagnosis


Sickle cell crisis versus selection abnormality versus dehydration


Narrative Course


21-year-old male presents to the emergency department with bilateral arm, 

bilateral leg, back pain for 2 days consistent with his typical sickle cell 

crisis.  I discussed the case with my attending physician, Dr. Reardon.  Patient 

will be given normal saline 1 L IV bolus, Dilaudid 2 mg IV, Zofran 4 mg IV.  

Patient will be discharged to follow up with his hematologist.  He verbalizes 

agreement and understanding.





Diagnosis





 Primary Impression:  


 Sickle cell pain crisis


Referrals:  


Deanna Glover MD


call for appointment


Patient Instructions:  General Instructions, Sickle Cell Crisis (ED)





***Additional Instructions:


Follow up with Dr. Glover.


Return to the emergency department for any acute, worsening of symptoms.


***Med/Other Pt SpecificInfo:  No Change to Meds


Disposition:  01 DISCHARGE HOME


Condition:  Brandi Silvestre May 12, 2017 17:09

## 2017-05-12 NOTE — PD
Physical Exam


Date Seen by Provider:  May 12, 2017


Time Seen by Provider:  16:47


Narrative


21 YOBM  C/O SS CRISIS SINCE YEST. 8/10 PAIN. WENT TO [place] [facility] Y[place] WITH FAMILY.





VSS





wating for bed asignment





Data


Data


Last Documented VS





Vital Signs








  Date Time  Temp Pulse Resp B/P Pulse Ox O2 Delivery O2 Flow Rate FiO2


 


5/12/17 16:45 98.0 76 20 127/80 95 Room Air  











[facility]


Medical Record Reviewed:  No


Supervised Visit with EDA:  Matteo Martell May 12, 2017 16:50

## 2017-05-13 ENCOUNTER — HOSPITAL ENCOUNTER (EMERGENCY)
Dept: HOSPITAL 17 - NEPC | Age: 22
Discharge: HOME | End: 2017-05-13
Payer: COMMERCIAL

## 2017-05-13 VITALS
SYSTOLIC BLOOD PRESSURE: 121 MMHG | DIASTOLIC BLOOD PRESSURE: 61 MMHG | RESPIRATION RATE: 16 BRPM | OXYGEN SATURATION: 96 % | HEART RATE: 76 BPM

## 2017-05-13 VITALS
DIASTOLIC BLOOD PRESSURE: 65 MMHG | TEMPERATURE: 98.1 F | SYSTOLIC BLOOD PRESSURE: 122 MMHG | RESPIRATION RATE: 15 BRPM | OXYGEN SATURATION: 93 % | HEART RATE: 75 BPM

## 2017-05-13 VITALS — WEIGHT: 145.51 LBS | BODY MASS INDEX: 23.38 KG/M2 | HEIGHT: 66 IN

## 2017-05-13 VITALS — OXYGEN SATURATION: 96 %

## 2017-05-13 DIAGNOSIS — D57.00: Primary | ICD-10-CM

## 2017-05-13 LAB
ANION GAP SERPL CALC-SCNC: 8 MEQ/L (ref 5–15)
BASOPHILS # BLD AUTO: 0.1 TH/MM3 (ref 0–0.2)
BASOPHILS NFR BLD: 0.7 % (ref 0–2)
BUN SERPL-MCNC: 5 MG/DL (ref 7–18)
CHLORIDE SERPL-SCNC: 107 MEQ/L (ref 98–107)
EOSINOPHIL # BLD: 0.3 TH/MM3 (ref 0–0.4)
EOSINOPHIL NFR BLD: 1.9 % (ref 0–4)
ERYTHROCYTE [DISTWIDTH] IN BLOOD BY AUTOMATED COUNT: 18.6 % (ref 11.6–17.2)
GFR SERPLBLD BASED ON 1.73 SQ M-ARVRAT: 234 ML/MIN (ref 89–?)
HCO3 BLD-SCNC: 25.6 MEQ/L (ref 21–32)
HCT VFR BLD CALC: 22.1 % (ref 39–51)
HEMO FLAGS: (no result)
HGB S BLD QL SOLY: (no result)
HOWELL-JOLLY BOD BLD QL SMEAR: PRESENT
LYMPHOCYTES # BLD AUTO: 3.5 TH/MM3 (ref 1–4.8)
LYMPHOCYTES NFR BLD AUTO: 24.2 % (ref 9–44)
MCH RBC QN AUTO: 25.6 PG (ref 27–34)
MCHC RBC AUTO-ENTMCNC: 32.9 % (ref 32–36)
MCV RBC AUTO: 77.8 FL (ref 80–100)
MONOCYTES NFR BLD: 8.4 % (ref 0–8)
NEUTROPHILS # BLD AUTO: 9.5 TH/MM3 (ref 1.8–7.7)
NEUTROPHILS NFR BLD AUTO: 64.8 % (ref 16–70)
PLATELET # BLD: 267 TH/MM3 (ref 150–450)
POLYCHROMASIA BLD QL SMEAR: 4.3 % (ref 0–1.9)
POTASSIUM SERPL-SCNC: 3.4 MEQ/L (ref 3.5–5.1)
RBC # BLD AUTO: 2.84 MIL/MM3 (ref 4.5–5.9)
RETICS/RBC NFR: 12.4 % (ref 0.4–3)
REVIEW FLAG: (no result)
SCAN/DIFF: (no result)
SODIUM SERPL-SCNC: 141 MEQ/L (ref 136–145)
WBC # BLD AUTO: 14.6 TH/MM3 (ref 4–11)

## 2017-05-13 PROCEDURE — 96375 TX/PRO/DX INJ NEW DRUG ADDON: CPT

## 2017-05-13 PROCEDURE — 99284 EMERGENCY DEPT VISIT MOD MDM: CPT

## 2017-05-13 PROCEDURE — 85044 MANUAL RETICULOCYTE COUNT: CPT

## 2017-05-13 PROCEDURE — 80048 BASIC METABOLIC PNL TOTAL CA: CPT

## 2017-05-13 PROCEDURE — 96374 THER/PROPH/DIAG INJ IV PUSH: CPT

## 2017-05-13 PROCEDURE — 85025 COMPLETE CBC W/AUTO DIFF WBC: CPT

## 2017-05-13 NOTE — PD
HPI


Chief Complaint:  Sickle Cell


Time Seen by Provider:  04:47


Travel History


International Travel<30 days:  No


Contact w/Intl Traveler<30days:  No


Traveled to known affect area:  No





History of Present Illness


HPI


21-year-old male complains of joint pain.  Patient has history of sickle cell 

disease with frequent sickle cell crisis pain.  Patient was seen in emergency 

room yesterday and given IV fluids, Dilaudid 1 mg IV and Zofran.  Patient 

states that he has persistent joint pain all over the body.  Patient denies any 

fever chills.  Patient denies any chest pain or shortness of breath.  Patient 

denies abdominal pain.  Patient denies any nausea vomiting diarrhea.





PFSH


Past Medical History


Hx Anticoagulant Therapy:  No


Anemia:  Yes


Arthritis:  No


Asthma:  No


Autoimmune Disease:  No


Blood Disorders:  Yes ( G6PD DEFICIENCY)


Anxiety:  No


Depression:  No


Heart Rhythm Problems:  No


Cancer:  No


High Cholesterol:  No


Chemotherapy:  No


Chest Pain:  Yes


Congestive Heart Failure:  No


COPD:  No


Cerebrovascular Accident:  No


Diabetes:  No


Diminished Hearing:  No


Endocrine:  No


Gastrointestinal Disorders:  Yes (gall stones)


GERD:  No


Genitourinary:  No


Hiatal Hernia:  No


Immune Disorder:  Yes (SICKLE CELL)


Implanted Vascular Access Dvce:  No


Kidney Stones:  No


Musculoskeletal:  No


Neurologic:  No


Psychiatric:  Yes


Reproductive:  No


Respiratory:  No


Immunizations Current:  Yes


Migraines:  No


Pneumonia:  Yes


Radiation Therapy:  No


Renal Failure:  No


Seizures:  No


Sickle Cell Disease:  Yes


Sleep Apnea:  No


Thyroid Disease:  No


Ulcer:  No





Past Surgical History


Abdominal Surgery:  No


AICD:  No


Arteriovenous Shunt:  No


Cardiac Surgery:  No


Ear Surgery:  No


Endocrine Surgery:  No


Eye Surgery:  No


Genitourinary Surgery:  No


Gynecologic Surgery:  No


Hysterectomy:  No


Insulin Pump:  No


Joint Replacement:  No


Oral Surgery:  Yes (dental surgery "AS A SMALL CHILD")


Pacemaker:  No


Thoracic Surgery:  No


Other Surgery:  Yes (PICC LINE /picc line removal)





Social History


Alcohol Use:  No


Tobacco Use:  No


Substance Use:  No





Allergies-Medications


(Allergen,Severity, Reaction):  


Coded Allergies:  


     Sulfa (Verified  Allergy, Intermediate, itching, 5/13/17)


     Morphine (Verified  Adverse Reaction, Mild, itching, 5/13/17)


 states it doesn't work and causes him to itch


Uncoded Allergies:  


     LEONARD BEANS (Allergy, Intermediate, 10/10/14)


Reported Meds & Prescriptions





Reported Meds & Active Scripts


Active


Reported


Hydroxyurea 500 Mg Cap 500 Mg PO DAILY


Folic Acid 400 Mcg Tab 400 Mcg PO DAILY








Review of Systems


General / Constitutional:  No: Fever


Eyes:  No: Visual changes


HENT:  No: Headaches


Cardiovascular:  No: Chest Pain or Discomfort


Respiratory:  No: Shortness of Breath


Gastrointestinal:  No: Abdominal Pain


Genitourinary:  No: Dysuria


Musculoskeletal:  Positive: Pain


Skin:  No Rash


Neurologic:  No: Weakness


Psychiatric:  No: Depression


Endocrine:  No: Polydipsia


Hematologic/Lymphatic:  No: Easy Bruising





Physical Exam


Narrative


GENERAL: Well-nourished, well-developed patient.


SKIN: Focused skin assessment warm/dry.


HEAD: Normocephalic.


EYES: No scleral icterus. No injection or drainage. 


NECK: Supple, trachea midline. No JVD or lymphadenopathy.


CARDIOVASCULAR: Regular rate and rhythm without murmurs, gallops, or rubs. 


RESPIRATORY: Breath sounds equal bilaterally. No accessory muscle use.


GASTROINTESTINAL: Abdomen soft, non-tender, nondistended. 


MUSCULOSKELETAL: No cyanosis, or edema. 


BACK: Nontender without obvious deformity. No CVA tenderness. 


Neurologic exam normal.





Data


Data


Last Documented VS





Vital Signs








  Date Time  Temp Pulse Resp B/P Pulse Ox O2 Delivery O2 Flow Rate FiO2


 


5/13/17 06:00  76 16 121/61 96   


 


5/13/17 04:35 98.1     Room Air  








Orders








 Complete Blood Count With Diff (5/13/17 04:57)


Basic Metabolic Panel (Bmp) (5/13/17 04:57)


Iv Access Insert/Monitor (5/13/17 04:57)


Ecg Monitoring (5/13/17 04:57)


Oximetry (5/13/17 04:57)


Retic Count (5/13/17 04:57)


Hydromorphone Pf Inj (Dilaudid Pf Inj) (5/13/17 05:00)


Diphenhydramine Inj (Benadryl Inj) (5/13/17 05:00)


Sodium Chlor 0.9% 1000 Ml Inj (Ns 1000 M (5/13/17 05:00)








Labs








 Laboratory Tests








Test 5/13/17





 05:10


 


White Blood Count 14.6 TH/MM3


 


Red Blood Count 2.84 MIL/MM3


 


Hemoglobin 7.3 GM/DL


 


Hematocrit 22.1 %


 


Mean Corpuscular Volume 77.8 FL


 


Mean Corpuscular Hemoglobin 25.6 PG


 


Mean Corpuscular Hemoglobin 32.9 %





Concent 


 


Red Cell Distribution Width 18.6 %


 


Platelet Count 267 TH/MM3


 


Mean Platelet Volume 7.6 FL


 


Neutrophils (%) (Auto) 64.8 %


 


Lymphocytes (%) (Auto) 24.2 %


 


Monocytes (%) (Auto) 8.4 %


 


Eosinophils (%) (Auto) 1.9 %


 


Basophils (%) (Auto) 0.7 %


 


Neutrophils # (Auto) 9.5 TH/MM3


 


Lymphocytes # (Auto) 3.5 TH/MM3


 


Monocytes # (Auto) 1.2 TH/MM3


 


Eosinophils # (Auto) 0.3 TH/MM3


 


Basophils # (Auto) 0.1 TH/MM3


 


CBC Comment AUTO DIFF 


 


Differential Comment AUTO DIFF





 CONFIRMED


 


Polychromasia 4.3 %


 


Sickle Cells 1+ 


 


B[place] Bodies PRESENT 


 


Reticulocyte Count 12.4 %


 


Absolute Reticulocyte Count 343.2 MIL/L


 


Sodium Level 141 MEQ/L


 


Potassium Level 3.4 MEQ/L


 


Chloride Level 107 MEQ/L


 


Carbon Dioxide Level 25.6 MEQ/L


 


Anion Gap 8 MEQ/L


 


Blood Urea Nitrogen 5 MG/DL


 


Creatinine 0.53 MG/DL


 


Estimat Glomerular Filtration 234 ML/MIN





Rate 


 


Random Glucose 93 MG/DL


 


Calcium Level 8.6 MG/DL














MDM


Medical Decision Making


Medical Screen Exam Complete:  Yes


Emergency Medical Condition:  Yes


Interpretation(s)


6:12 AM.  CBC WBC 14.6.  Hemoglobin 7.3 hematocrit 22.1.  MCV 77.8.  Normal 

differential.  Retic count 12.4.  BMP within normal limit.  Potassium 3.4.


Differential Diagnosis


Differential diagnosis including sickle cell with painful crisis, anemia, acute 

on chronic pain.


Narrative Course


21-year-old male complains of joint pain.  History of sickle cell disease with 

frequent sickle cell crisis pain.  Normal saline solution 1 L IV bolus.  

Dilaudid 1 mg IV.  Benadryl 50 mg IV.  Zofran 4 mg IV.  I spoke with Dr. Harkins, 

oncologist on call covering for Dr. Glover.  Patient has stable vital signs.  

Patient is not tachycardic.  Patient can ambulate well without any problem.  No 

evidence of joint swelling or fever.  CBC  with hemoglobin and hematocrit is 

stable.  BMP with urine creatinine without evidence of dehydration.  Patient 

was advised to follow-up with personal physician.  Return if worse.





Diagnosis





 Primary Impression:  


 Sickle cell pain crisis


Patient Instructions:  General Instructions





***Additional Instructions:


Encourage by mouth fluids.  Follow-up with personal physician.  Return if worse.


***Med/Other Pt SpecificInfo:  No Change to Meds


Disposition:  01 DISCHARGE HOME


Condition:  Stable








Chip Neumann MD May 13, 2017 05:09

## 2017-06-07 ENCOUNTER — HOSPITAL ENCOUNTER (INPATIENT)
Dept: HOSPITAL 17 - NEPE | Age: 22
LOS: 4 days | Discharge: HOME | DRG: 812 | End: 2017-06-11
Attending: FAMILY MEDICINE | Admitting: FAMILY MEDICINE
Payer: COMMERCIAL

## 2017-06-07 VITALS — HEIGHT: 66 IN | WEIGHT: 138.23 LBS | BODY MASS INDEX: 22.22 KG/M2

## 2017-06-07 VITALS
TEMPERATURE: 98.1 F | HEART RATE: 83 BPM | DIASTOLIC BLOOD PRESSURE: 72 MMHG | RESPIRATION RATE: 18 BRPM | SYSTOLIC BLOOD PRESSURE: 117 MMHG | OXYGEN SATURATION: 99 %

## 2017-06-07 VITALS
OXYGEN SATURATION: 96 % | RESPIRATION RATE: 17 BRPM | HEART RATE: 107 BPM | SYSTOLIC BLOOD PRESSURE: 116 MMHG | TEMPERATURE: 98.3 F | DIASTOLIC BLOOD PRESSURE: 58 MMHG

## 2017-06-07 VITALS
HEART RATE: 111 BPM | RESPIRATION RATE: 18 BRPM | SYSTOLIC BLOOD PRESSURE: 148 MMHG | DIASTOLIC BLOOD PRESSURE: 81 MMHG | TEMPERATURE: 98.6 F | OXYGEN SATURATION: 91 %

## 2017-06-07 VITALS — OXYGEN SATURATION: 96 %

## 2017-06-07 VITALS — OXYGEN SATURATION: 97 %

## 2017-06-07 DIAGNOSIS — R04.2: ICD-10-CM

## 2017-06-07 DIAGNOSIS — R50.81: ICD-10-CM

## 2017-06-07 DIAGNOSIS — D57.00: Primary | ICD-10-CM

## 2017-06-07 DIAGNOSIS — Z79.82: ICD-10-CM

## 2017-06-07 LAB
ALP SERPL-CCNC: 75 U/L (ref 45–117)
ALT SERPL-CCNC: 29 U/L (ref 12–78)
ANION GAP SERPL CALC-SCNC: 12 MEQ/L (ref 5–15)
AST SERPL-CCNC: 45 U/L (ref 15–37)
BASOPHILS # BLD AUTO: 0.1 TH/MM3 (ref 0–0.2)
BASOPHILS NFR BLD: 0.5 % (ref 0–2)
BILIRUB SERPL-MCNC: 2.3 MG/DL (ref 0.2–1)
BUN SERPL-MCNC: 7 MG/DL (ref 7–18)
CHLORIDE SERPL-SCNC: 107 MEQ/L (ref 98–107)
EOSINOPHIL # BLD: 0.6 TH/MM3 (ref 0–0.4)
EOSINOPHIL NFR BLD: 2.9 % (ref 0–4)
EOSINOPHIL NFR BLD: 3 % (ref 0–4)
ERYTHROCYTE [DISTWIDTH] IN BLOOD BY AUTOMATED COUNT: 18.3 % (ref 11.6–17.2)
GFR SERPLBLD BASED ON 1.73 SQ M-ARVRAT: 152 ML/MIN (ref 89–?)
HCO3 BLD-SCNC: 24.1 MEQ/L (ref 21–32)
HCT VFR BLD CALC: 18.7 % (ref 39–51)
HCT VFR BLD CALC: 20.9 % (ref 39–51)
HEMO FLAGS: (no result)
LYMPHOCYTES # BLD AUTO: 8.9 TH/MM3 (ref 1–4.8)
LYMPHOCYTES NFR BLD AUTO: 45.1 % (ref 9–44)
MCH RBC QN AUTO: 26.2 PG (ref 27–34)
MCHC RBC AUTO-ENTMCNC: 32.8 % (ref 32–36)
MCV RBC AUTO: 79.8 FL (ref 80–100)
MONOCYTES NFR BLD: 5.3 % (ref 0–8)
MYELOCYTES NFR BLD: 3 % (ref 0–0)
NEUTROPHILS # BLD AUTO: 9.1 TH/MM3 (ref 1.8–7.7)
NEUTROPHILS NFR BLD AUTO: 46.2 % (ref 16–70)
NEUTS BAND # BLD MANUAL: 9.7 TH/MM3 (ref 1.8–7.7)
NEUTS SEG NFR BLD MANUAL: 46 % (ref 16–70)
OVALOCYTES BLD QL SMEAR: (no result)
PLAT MORPH BLD: NORMAL
PLATELET # BLD: 219 TH/MM3 (ref 150–450)
PLATELET BLD QL SMEAR: NORMAL
POLYCHROMASIA BLD QL SMEAR: 3.2 % (ref 0–1.9)
POTASSIUM SERPL-SCNC: 3.4 MEQ/L (ref 3.5–5.1)
RBC # BLD AUTO: 2.62 MIL/MM3 (ref 4.5–5.9)
RETICS/RBC NFR: 13.9 % (ref 0.4–3)
REVIEW FLAG: (no result)
REVIEW FLAG: (no result)
SCAN/DIFF: (no result)
SCHISTOCYTES BLD QL SMEAR: (no result)
SODIUM SERPL-SCNC: 143 MEQ/L (ref 136–145)
TARGETS BLD QL SMEAR: (no result)
WBC # BLD AUTO: 19.8 TH/MM3 (ref 4–11)
WBC DIFF SAMPLE: 100
WBC NRBC COR # BLD: 4 /100 WBC (ref 0–0)

## 2017-06-07 PROCEDURE — 86922 COMPATIBILITY TEST ANTIGLOB: CPT

## 2017-06-07 PROCEDURE — 86900 BLOOD TYPING SEROLOGIC ABO: CPT

## 2017-06-07 PROCEDURE — 86902 BLOOD TYPE ANTIGEN DONOR EA: CPT

## 2017-06-07 PROCEDURE — 80048 BASIC METABOLIC PNL TOTAL CA: CPT

## 2017-06-07 PROCEDURE — 86920 COMPATIBILITY TEST SPIN: CPT

## 2017-06-07 PROCEDURE — P9016 RBC LEUKOCYTES REDUCED: HCPCS

## 2017-06-07 PROCEDURE — 85014 HEMATOCRIT: CPT

## 2017-06-07 PROCEDURE — 96375 TX/PRO/DX INJ NEW DRUG ADDON: CPT

## 2017-06-07 PROCEDURE — 86850 RBC ANTIBODY SCREEN: CPT

## 2017-06-07 PROCEDURE — 71010: CPT

## 2017-06-07 PROCEDURE — 85027 COMPLETE CBC AUTOMATED: CPT

## 2017-06-07 PROCEDURE — 85044 MANUAL RETICULOCYTE COUNT: CPT

## 2017-06-07 PROCEDURE — 71020: CPT

## 2017-06-07 PROCEDURE — 96361 HYDRATE IV INFUSION ADD-ON: CPT

## 2017-06-07 PROCEDURE — 85018 HEMOGLOBIN: CPT

## 2017-06-07 PROCEDURE — 36430 TRANSFUSION BLD/BLD COMPNT: CPT

## 2017-06-07 PROCEDURE — 86901 BLOOD TYPING SEROLOGIC RH(D): CPT

## 2017-06-07 PROCEDURE — 85007 BL SMEAR W/DIFF WBC COUNT: CPT

## 2017-06-07 PROCEDURE — 80053 COMPREHEN METABOLIC PANEL: CPT

## 2017-06-07 PROCEDURE — 93005 ELECTROCARDIOGRAM TRACING: CPT

## 2017-06-07 PROCEDURE — 86140 C-REACTIVE PROTEIN: CPT

## 2017-06-07 PROCEDURE — 96374 THER/PROPH/DIAG INJ IV PUSH: CPT

## 2017-06-07 PROCEDURE — 96376 TX/PRO/DX INJ SAME DRUG ADON: CPT

## 2017-06-07 RX ADMIN — HEPARIN SODIUM SCH UNITS: 10000 INJECTION, SOLUTION INTRAVENOUS; SUBCUTANEOUS at 20:44

## 2017-06-07 RX ADMIN — HYDROMORPHONE HYDROCHLORIDE SCH MG: 2 INJECTION INTRAMUSCULAR; INTRAVENOUS; SUBCUTANEOUS at 19:27

## 2017-06-07 RX ADMIN — Medication SCH ML: at 20:45

## 2017-06-07 RX ADMIN — HYDROMORPHONE HYDROCHLORIDE SCH MG: 2 INJECTION INTRAMUSCULAR; INTRAVENOUS; SUBCUTANEOUS at 23:57

## 2017-06-07 RX ADMIN — ASPIRIN 81 MG SCH MG: 81 TABLET ORAL at 22:12

## 2017-06-07 RX ADMIN — HYDROMORPHONE HYDROCHLORIDE SCH MG: 2 INJECTION INTRAMUSCULAR; INTRAVENOUS; SUBCUTANEOUS at 17:30

## 2017-06-07 RX ADMIN — HYDROMORPHONE HYDROCHLORIDE SCH MG: 2 INJECTION INTRAMUSCULAR; INTRAVENOUS; SUBCUTANEOUS at 22:05

## 2017-06-07 RX ADMIN — MAGNESIUM SULFATE HEPTAHYDRATE SCH MLS/HR: 500 INJECTION, SOLUTION INTRAMUSCULAR; INTRAVENOUS at 13:47

## 2017-06-07 RX ADMIN — MAGNESIUM SULFATE HEPTAHYDRATE SCH MLS/HR: 500 INJECTION, SOLUTION INTRAMUSCULAR; INTRAVENOUS at 23:56

## 2017-06-07 RX ADMIN — ONDANSETRON PRN MG: 2 INJECTION, SOLUTION INTRAMUSCULAR; INTRAVENOUS at 20:45

## 2017-06-07 NOTE — EKG
Date Performed: 06/07/2017       Time Performed: 10:56:50

 

PTAGE:      21 years

 

EKG:      Sinus rhythm 

 

 NORMAL ECG

 

PREVIOUS TRACING       : 05/06/2017 15.04 Compared to prior tracing no significant change

 

DOCTOR:   Smith Matthew  Interpretating Date/Time  06/07/2017 14:30:27

## 2017-06-07 NOTE — RADRPT
EXAM DATE/TIME:  06/07/2017 10:49 

 

[facility] COMPARISON:     

CHEST SINGLE AP, May 06, 2017, 14:24.

 

                     

INDICATIONS :     

Chest pain.

                     

 

MEDICAL HISTORY :     

Sickle Cell disease.          

 

SURGICAL HISTORY :     

None.   

 

ENCOUNTER:     

Initial                                        

 

ACUITY:     

2 days      

 

PAIN SCORE:     

10/10

 

LOCATION:     

Bilateral  chest

 

FINDINGS:     

A single view of the chest demonstrates the lungs to be symmetrically aerated without evidence of mas
s, infiltrate or effusion.  The cardiomediastinal contours are unremarkable.  Osseous structures are 
intact.

 

CONCLUSION:     

1. No acute cardiopulmonary disease.

 

 

 

 Rajan Garcia MD on June 07, 2017 at 10:53           

Board Certified Radiologist.

 This report was verified electronically.

## 2017-06-07 NOTE — HHI.HP
[facility]


Service


Family Medicine


Primary Care Physician


Non-Staff


Admission Diagnosis


Sickle Cell Crisis


Diagnoses:  


International Travel<30 Days:  No


Contact w/Intl Traveler<30days:  No


Known Affected Area:  No


History of Present Illness


21 year old male with history of sickle cell disease and G6PD deficiency 

presents with sickle cell crisis. He is experiencing pain in his chest, back, 

and thighs bilaterally. This is typical for pain crises that he's had in the 

past. Onset was 2 days ago with worsening today. He's been hospitalized, per 

his report, about 6 times in the past year for pain crises. His hematologist is 

Dr. Glover. He states he's been keeping up with his appointments. He felt 

shortness of breath with the pain. He has no coughing or sputum production. No 

abdominal pain, nausea, vomiting, diarrhea, or constipation. He rates the pain 

10/10 currently. He had acute chest syndrome once when he was about 16 years 

old. No fevers or chills. He has no headaches or seizures.  (Kel Donovan MD 

R2)





Review of Systems


Constitutional:  DENIES: Diaphoretic episodes, Fever, Chills, Change in appetite

, Night Sweats


Endocrine:  DENIES: Polydipsia, Polyuria, Polyphagia


Eyes:  DENIES: Blurred vision, Eye pain, Vision loss, Double Vision


Ears, nose, mouth, throat:  DENIES: Nasal discharge, Throat pain, Running Nose


Respiratory:  COMPLAINS OF: Shortness of breath,  DENIES: Cough, Wheezing, 

Hemoptysis


Cardiovascular:  COMPLAINS OF: Chest pain,  DENIES: Palpitations, Dyspnea on 

Exertion, Orthopnea


Gastrointestinal:  DENIES: Abdominal pain, Bloody stools, Constipation, Diarrhea

, Nausea, Vomiting


Genitourinary:  DENIES: Urgency, Hematuria, Testicular Pain


Musculoskeletal:  COMPLAINS OF: Muscle aches, Back pain,  DENIES: Joint pain, 

Stiffness


Integumentary:  DENIES: Pruritus, Rash


Hematologic/lymphatic:  DENIES: Lymphadenopathy


Immunologic/allergic:  DENIES: Eczema


Neurologic:  DENIES: Headache


Psychiatric:  COMPLAINS OF: Anxiety, Agitation,  DENIES: Confusion, Mood changes

, Depression (Kel Donovan MD R2)





Past Family Social History


Past Medical History


Sickle cell disease 


History of acute chest syndrome once at age 16 


G6PD 


Follows with Dr. Glover for hematology


Past Surgical History


Dental work


Reported Medications





Reported Meds & Active Scripts


Active


Reported


Droxia (Hydroxyurea (Sickle Cell Anemia)) 400 Mg Cap 500 Can PO DAILY


Folic Acid 400 Mcg Tab 400 Mcg PO DAILY


Folic Acid 400 Mcg Tab 400 Mcg PO DAILY (Kel Donovan MD R2)


Allergies:  


Coded Allergies:  


     Sulfa (Verified  Allergy, Intermediate, itching, 5/13/17)


     Morphine (Verified  Adverse Reaction, Mild, itching, 5/13/17)


 states it doesn't work and causes him to itch


Uncoded Allergies:  


     LEONARD BEANS (Allergy, Intermediate, 10/10/14)


Active Ordered Medications





 Inpatient Medications


Acetaminophen (Tylenol Supp) 650 mg Q4H  PRN RECTAL FEVER;  Start 6/7/17 at 14:

00


Albuterol Sulfate (Albuterol Neb) 2.5 mg Q2HR NEB  PRN INH SHORTNESS OF BREATH;

  Start 6/7/17 at 14:00


Albuterol/ Ipratropium (Duoneb Neb) 1 ampule Q4HR NEB  PRN INH RESPIRATORY 

DISTRESS;  Start 6/7/17 at 14:00


Dextrose/Sodium Chloride (D5W-1/2 NS 1000 ml Inj) 1,000 ml @  100 mls/hr Q10H 

IV ;  Start 6/7/17 at 13:47


Diphenhydramine HCl (Benadryl Inj) 25 mg ONCE  ONCE IV PUSH  Last administered 

on 6/7/17at 10:14;  Start 6/7/17 at 10:15;  Stop 6/7/17 at 10:16;  Status DC


Diphenhydramine HCl (Benadryl) 25 mg Q4H  PRN PO ITCHING;  Start 6/7/17 at 14:00


Folic Acid (Folate) 1 mg DAILY PO ;  Start 6/8/17 at 09:00


Heparin Sodium (Porcine) (Heparin Inj) 5,000 units Q8H SQ ;  Start 6/7/17 at 14:

00


Hydromorphone HCl (Dilaudid Pf Inj) 2 mg Q4H  PRN IV BREAKTHROUGH PAIN;  Start 6 /7/17 at 18:00;  Status UNV


Hydroxyurea (Hydrea) 500 mg DAILY PO ;  Start 6/8/17 at 09:00


Multivitamins (Theragran) 1 tab DAILY PO ;  Start 6/8/17 at 09:00


Naloxone HCl (Narcan Inj) 0.4 mg UNSCH  PRN IV SEE LABEL COMMENTS;  Start 6/7/ 17 at 14:00


Ondansetron HCl (Zofran Inj) 4 mg Q6H  PRN IV NAUSEA;  Start 6/7/17 at 14:00


Oxycodone HCl (Roxicodone) 5 mg Q4H  PRN PO pain1-5;  Start 6/7/17 at 15:30;  

Status UNV


Senna/Docusate Sodium 1 tab 1 tab BID  PRN PO constipation;  Start 6/7/17 at 14:

00


Sodium Chloride (NS 1000 ml Inj) 1,000 ml @  1,000 mls/hr Q1H ONCE IV  Last 

administered on 6/7/17at 10:09;  Start 6/7/17 at 09:50;  Stop 6/7/17 at 11:00;  

Status DC


Sodium Chloride (NS Flush) 2 ml BID IV FLUSH ;  Start 6/7/17 at 21:00;  Stop 6/7 /17 at 21:00;  Status DC


Sodium Chloride 2 ml 2 ml UNSCH  PRN IVF FLUSH AFTER USING IV ACCESS;  Start 6/7 /17 at 10:00


Family History


Sickle cell in the family


Social History


Marijuana use 


Graduated from college recently  (Kel Donovan MD R2)





Physical Exam


Vital Signs





Vital Signs








  Date Time  Temp Pulse Resp B/P Pulse Ox O2 Delivery O2 Flow Rate FiO2


 


6/7/17 14:26     96   21


 


6/7/17 13:40   20     


 


6/7/17 11:56   22  99 Nasal Cannula 3 


 


6/7/17 10:48 98.1 83 18 117/72 99 Nasal Cannula 3 


 


6/7/17 10:46   20     


 


6/7/17 10:40   25     








Physical Exam


GENERAL: Healthy appearing, in obvious distress from pain 


SKIN: No rashes 


HEENT: normocephalic, has scleral icterus mild, no nasal discharge, pharynx 

normal, no lymphadenopathy 


NECK: Thyroid normal 


CARDIOVASCULAR: Flow murmur, regular rate and rhythm, normal pulses 


RESPIRATORY: Clear to auscultation. Breath sounds equal bilaterally. No wheezes

, rales, or rhonchi.  No consolidations. 


GASTROINTESTINAL: Abdomen soft, non-tender, nondistended. No hepato-splenomegaly

, or palpable masses. No guarding.


MUSCULOSKELETAL: Thighs painful to palpation bilaterally. Lower back painful to 

palpation. 


NEUROLOGICAL: Awake and alert. Cranial nerves II through XII intact.  Motor and 

sensory grossly within normal limits. Five out of 5 muscle strength in all 

muscle groups.  Normal speech. In obvious pain.


Laboratory





Laboratory Tests








Test 6/7/17





 10:00


 


White Blood Count 19.8 


 


Red Blood Count 2.62 


 


Hemoglobin 6.9 


 


Hematocrit 20.9 


 


Mean Corpuscular Volume 79.8 


 


Mean Corpuscular Hemoglobin 26.2 


 


Mean Corpuscular Hemoglobin 32.8 





Concent 


 


Red Cell Distribution Width 18.3 


 


Platelet Count 219 


 


Mean Platelet Volume 8.3 


 


Neutrophils (%) (Auto) 46.2 


 


Lymphocytes (%) (Auto) 45.1 


 


Monocytes (%) (Auto) 5.3 


 


Eosinophils (%) (Auto) 2.9 


 


Basophils (%) (Auto) 0.5 


 


Neutrophils # (Auto) 9.1 


 


Lymphocytes # (Auto) 8.9 


 


Monocytes # (Auto) 1.1 


 


Eosinophils # (Auto) 0.6 


 


Basophils # (Auto) 0.1 


 


CBC Comment AUTO DIFF 


 


Differential Total Cells 100 





Counted 


 


Neutrophils % (Manual) 46 


 


Lymphocytes % 45 


 


Monocytes % 3 


 


Eosinophils % 3 


 


Neutrophils # (Manual) 9.7 


 


Myelocytes 3 


 


Nucleated Red Blood Cells 4 


 


Differential Comment FINAL DIFF





 MANUAL


 


Platelet Estimate NORMAL 


 


Platelet Morphology Comment NORMAL 


 


Polychromasia 3.2 


 


Target Cells 1+ 


 


Ovalocytes 1+ 


 


Keratocytes OCC 


 


Red Cell Morphology Comment  


 


Reticulocyte Count 13.9 


 


Absolute Reticulocyte Count 364.4 


 


Sodium Level 143 


 


Potassium Level 3.4 


 


Chloride Level 107 


 


Carbon Dioxide Level 24.1 


 


Anion Gap 12 


 


Blood Urea Nitrogen 7 


 


Creatinine 0.77 


 


Estimat Glomerular Filtration 152 





Rate 


 


Random Glucose 98 


 


Calcium Level 9.0 


 


Total Bilirubin 2.3 


 


Aspartate Amino Transf 45 





(AST/SGOT) 


 


Alanine Aminotransferase 29 





(ALT/SGPT) 


 


Alkaline Phosphatase 75 


 


C-Reactive Protein 0.75 


 


Total Protein 7.9 


 


Albumin 4.6 





 (Kel Donovan MD R2)


Result Diagram:  


6/7/17 1000                                                                    

            6/7/17 1000





Imaging





Last 72 hours Impressions








Chest X-Ray 6/7/17 1009 Signed





Impressions: 





 Service Date/Time:  Wednesday, June 7, 2017 10:49 - CONCLUSION:  1. No acute 





 cardiopulmonary disease.     Rajan Garcia MD 





 (Kel Donovan MD R2)





Septic Shock Reassessment


Heart:  Regular rate and rhythm


Lungs:  Clear


Skin:  Warm


Capillary Refill:  <2 seconds (Kel Donovan MD R2)





Assessment and Plan


Assessment and Plan


21 year old male admitted with sickle cell pain crisis.


Code Status


FULL CODE


Discussed Condition With


Seen and discussed with Dr. Daivd Vuong  (Kel Donovan MD R2)


Attending Attestation


Patient seen and examined.  Case reviewed and discussed with the resident team.

  Agree with plan of care as discussed with me and documented in the resident 

note. (Yazmin South MD)


Problem List:  


(1) Sickle cell pain crisis


Status:  Acute


Plan:  Sickle cell pain crisis located in bilateral thighs, chest, and lower 

back. No coughing or consolidations. Chest x-ray negative. No fevers or chills. 

Hemoglobin 6.9 (baseline 7.5-8.0), retic count elevated, bilirubin elevated, 

mild scleral icterus present. 





- Consult hematology, known to Dr. Glover 


- Pain management: Oxycodone 5 mg PO q4hrs for pain 1-5, oxycodone 10 mg PO 

q4hrs for pain 6-10, Dilaudid 2 mg for breakthrough pain 


- PCA pain pump if not managed with the above. 


- IVF - not dehydrated on exam, will give D5 1/2 NS at 100 mls/hr. 


- Monitor hemoglobin: baseline of about 7.5 to 8.0, currently 6.9 with some 

scleral icterus, retic count elevated. Transfuse if symptomatic anemia. 


- Low threshold for CT and/or antibiotics if suspicious for acute chest 

syndrome. 


- Continue folic acid, multivitamin


- Continue Hydroxyurea.  








(2) DVT prophylaxis


Status:  Acute


Plan:  - Heparin 5000 units tid 





(3) Nutrition, metabolism, and development symptoms


Status:  Acute


Plan:  - Regular diet 


- IVF: D5 1/2 NS at 100 mls/hr 


 (Kel Donovan MD R2)





Physician Certification


2 Midnight Certification Type:  Admission for Inpatient Services


Order for Inpatient Services


The services are ordered in accordance with Medicare regulations or non-

Medicare payer requirements, as applicable.  In the case of services not 

specified as inpatient-only, they are appropriately provided as inpatient 

services in accordance with the 2-midnight benchmark.


Estimated LOS (days):  3


 days is the estimated time the patient will need to remain in the hospital, 

assuming treatment plan goals are met and no additional complications.


Post-Hospital Plan:  Home (Kel Donovan MD R2)








Kel Donovan MD R2 Jun 7, 2017 15:49


Yazmin South MD Jun 7, 2017 16:31

## 2017-06-07 NOTE — HHI.FPPN
Subjective


Remarks


Patient seen, examined and discussed with the medicine team.





This is a 21 year-old -American male with known sickle cell disease who 

is here with chest pain and leg pain.  He's had this for 2 days, but it just 

continues to get worse, and now he cannot stand it.  Did pain is all throughout 

his chest, and in his thighs to just below his knees.  Dilaudid is what has 

worked for him in the past but PCA does not work because it is not adequate 

dose.  He's had approximately 6 hospitalizations for this in the past year.  

Multiple ED visits for his pain.  He has gallstones.  He's had admissions for 

acute chest syndrome.  He takes folate, Hydrea, and oxycodone for pain, and his 

hematologist is Dr. Glover, with whom he missed his last appointment.  His 

pain at this point is 10 out of 10.





See history and physical examination for this admission for additional 

historical details including past, family, social history and review of systems 

for this current admission.  Recent college grad last month.





Objective


Vitals





 Vital Signs








  Date Time  Temp Pulse Resp B/P Pulse Ox O2 Delivery O2 Flow Rate FiO2


 


6/7/17 14:26     96   21


 


6/7/17 13:40   20     


 


6/7/17 11:56   22  99 Nasal Cannula 3 


 


6/7/17 10:48 98.1 83 18 117/72 99 Nasal Cannula 3 


 


6/7/17 10:46   20     


 


6/7/17 10:40   25     








 I/O








 6/6/17 6/6/17 6/6/17 6/7/17 6/7/17 6/7/17





 06:59 14:59 22:59 06:59 14:59 22:59


 


Output Total     900 ml 


 


Balance     -900 ml 


 


      


 


Output Urine Total     900 ml 








Result Diagram:  


6/7/17 1000                                                                    

            6/7/17 1000





Other Results





 Laboratory Tests








Test 6/7/17





 10:00


 


White Blood Count 19.8 TH/MM3


 


Red Blood Count 2.62 MIL/MM3


 


Hemoglobin 6.9 GM/DL


 


Hematocrit 20.9 %


 


Mean Corpuscular Volume 79.8 FL


 


Mean Corpuscular Hemoglobin 26.2 PG


 


Red Cell Distribution Width 18.3 %


 


Lymphocytes (%) (Auto) 45.1 %


 


Neutrophils # (Auto) 9.1 TH/MM3


 


Lymphocytes # (Auto) 8.9 TH/MM3


 


Monocytes # (Auto) 1.1 TH/MM3


 


Eosinophils # (Auto) 0.6 TH/MM3


 


Lymphocytes % 45 %


 


Neutrophils # (Manual) 9.7 TH/MM3


 


Myelocytes 3 %


 


Nucleated Red Blood Cells 4 /100 WBC


 


Polychromasia 3.2 %


 


Target Cells 1+ 


 


Ovalocytes 1+ 


 


Reticulocyte Count 13.9 %


 


Absolute Reticulocyte Count 364.4 MIL/L


 


Potassium Level 3.4 MEQ/L


 


Total Bilirubin 2.3 MG/DL


 


Aspartate Amino Transf 45 U/L





(AST/SGOT) 


 


C-Reactive Protein 0.75 MG/DL








Imaging





Last Impressions








Chest X-Ray 6/7/17 1009 Signed





Impressions: 





 Service Date/Time:  Wednesday, June 7, 2017 10:49 - CONCLUSION:  1. No acute 





 cardiopulmonary disease.     Rajan Garcia MD 








Objective Remarks


O.  CONSTITUTIONAL/GEN:  normally nourished, in severe acute distress, writhing 

in pain, rubbing his legs and moving incessantly on the gurney.  


EYES: conjunctiva normal, PERRLA, EOMI, sclerae mildly icteric.


NECK:  thyroid midline, carotids symmetrical.  


LUNGS:  clear A-P, respiratory effort is normal.  


CARDIOVASCULAR:  RR without murmur or gallop.  No significant edema.  


GI/ABD:  soft without masses, without organomegaly.  Active bowel sounds


NEURO:  No focal deficits.  


SKIN:  color normal, no rashes noted.


HEME/LYMPH:  no bruising, petechia or significant adenopathy


MUSC:  back is normal in appearance.  Extremities are normal in appearance.


PSYCH/MENTAL STATUS:  Alert and oriented x 3.





A/P


Assessment and Plan


21 year old male admitted with sickle cell pain crisis.


Attending Attestation


Patient seen and examined.  Case reviewed and discussed with the resident team.

  Agree with plan of care as discussed with me and documented in the resident 

note.


Problem List:  


(1) Sickle cell pain crisis


Status:  Acute


Plan:  Sickle cell pain crisis located in bilateral thighs, chest, and lower 

back. No coughing or consolidations. Chest x-ray negative. No fevers or chills. 

Hemoglobin 6.9 (baseline 7.5-8.0), retic count elevated, bilirubin elevated, 

mild scleral icterus present. 





- Consult hematology, known to Dr. Glover 


- Pain management: Oxycodone 5 mg PO q4hrs for pain 1-5, oxycodone 10 mg PO 

q4hrs for pain 6-10, Dilaudid 2 mg for breakthrough pain 


- PCA pain pump if not managed with the above. 


- IVF - not dehydrated on exam, will give D5 1/2 NS at 100 mls/hr. 


- Monitor hemoglobin: baseline of about 7.5 to 8.0, currently 6.9 with some 

scleral icterus, retic count elevated. Transfuse if symptomatic anemia. 


- Low threshold for CT and/or antibiotics if suspicious for acute chest 

syndrome. 


- Continue folic acid, multivitamin


- Continue Hydroxyurea.  








(2) DVT prophylaxis


Status:  Acute


Plan:  - Heparin 5000 units tid 





(3) Nutrition, metabolism, and development symptoms


Status:  Acute


Plan:  - Regular diet 


- IVF: D5 1/2 NS at 100 mls/hr 











Yazmin South MD Jun 7, 2017 16:16

## 2017-06-07 NOTE — HHI.FPPN
Subjective


Remarks


Re-assessed patient this evening. He continues to be in severe pain. He 

continues to writhe in pain. He rates the pain 8/10. He has gotten minimal 

relief from current pain regimen. He has received Dilaudid 2 mg at 6 PM and is 

receiving 4 mg now. 





Physical exam: 


Writhing in pain


Diaphoretic 


RRR, flow murmur


Lungs clear to auscultation 


Thighs tender to palpation 


Lower  to palpation


Chest wall tender to palpation 





Plan: 


- Called to discuss with Dr. Pastrana, hematologist on call 


- Will start Dilaudid 4 mg q2hrs scheduled


- Hold pain medication for sedation/respiratory depression


- Nurse to monitor patient closely while receiving dilaudid 


- Discussed possibility of PCA pump, he refuses at this time. 


- Zeina ordering blood transfusion now 


- Wean pain medication as tolerated 





Seen and discussed with Dr. Vargas





Objective


Vitals





 Vital Signs








  Date Time  Temp Pulse Resp B/P Pulse Ox O2 Delivery O2 Flow Rate FiO2


 


6/7/17 14:39 98.3 107 17 116/58 96   


 


6/7/17 14:26     96   21


 


6/7/17 13:40   20     


 


6/7/17 11:56   22  99 Nasal Cannula 3 


 


6/7/17 10:48 98.1 83 18 117/72 99 Nasal Cannula 3 


 


6/7/17 10:46   20     


 


6/7/17 10:40   25     








 I/O








 6/6/17 6/6/17 6/6/17 6/7/17 6/7/17 6/7/17





 07:00 15:00 23:00 07:00 15:00 23:00


 


Output Total     900 ml 


 


Balance     -900 ml 


 


      


 


Output Urine Total     900 ml 








Result Diagram:  


6/7/17 1000                                                                    

            6/7/17 1000





Objective Remarks


O.  CONSTITUTIONAL/GEN:  normally nourished, in severe acute distress, writhing 

in pain, rubbing his legs and moving incessantly on the gurney.  


EYES: conjunctiva normal, PERRLA, EOMI, sclerae mildly icteric.


NECK:  thyroid midline, carotids symmetrical.  


LUNGS:  clear A-P, respiratory effort is normal.  


CARDIOVASCULAR:  RR without murmur or gallop.  No significant edema.  


GI/ABD:  soft without masses, without organomegaly.  Active bowel sounds


NEURO:  No focal deficits.  


SKIN:  color normal, no rashes noted.


HEME/LYMPH:  no bruising, petechia or significant adenopathy


MUSC:  back is normal in appearance.  Extremities are normal in appearance.


PSYCH/MENTAL STATUS:  Alert and oriented x 3.





A/P


Assessment and Plan


21 year old male admitted with sickle cell pain crisis.


Problem List:  


(1) Sickle cell pain crisis


Status:  Acute


Plan:  Sickle cell pain crisis located in bilateral thighs, chest, and lower 

back. No coughing or consolidations. Chest x-ray negative. No fevers or chills. 

Hemoglobin 6.9 (baseline 7.5-8.0), retic count elevated, bilirubin elevated, 

mild scleral icterus present. 





- Consult hematology, known to Dr. Glover 


- Pain management: Oxycodone 5 mg PO q4hrs for pain 1-5, oxycodone 10 mg PO 

q4hrs for pain 6-10, Dilaudid 2 mg for breakthrough pain 


- PCA pain pump if not managed with the above. 


- IVF - not dehydrated on exam, will give D5 1/2 NS at 100 mls/hr. 


- Monitor hemoglobin: baseline of about 7.5 to 8.0, currently 6.9 with some 

scleral icterus, retic count elevated. Transfuse if symptomatic anemia. 


- Low threshold for CT and/or antibiotics if suspicious for acute chest 

syndrome. 


- Continue folic acid, multivitamin


- Continue Hydroxyurea.  








(2) DVT prophylaxis


Status:  Acute


Plan:  - Heparin 5000 units tid 





(3) Nutrition, metabolism, and development symptoms


Status:  Acute


Plan:  - Regular diet 


- IVF: D5 1/2 NS at 100 mls/hr 











Kel Donovan MD R2 Jun 7, 2017 20:57

## 2017-06-08 VITALS
OXYGEN SATURATION: 94 % | HEART RATE: 97 BPM | SYSTOLIC BLOOD PRESSURE: 115 MMHG | RESPIRATION RATE: 20 BRPM | DIASTOLIC BLOOD PRESSURE: 56 MMHG | TEMPERATURE: 98.4 F

## 2017-06-08 VITALS
OXYGEN SATURATION: 92 % | SYSTOLIC BLOOD PRESSURE: 119 MMHG | RESPIRATION RATE: 18 BRPM | HEART RATE: 91 BPM | DIASTOLIC BLOOD PRESSURE: 57 MMHG | TEMPERATURE: 98.5 F

## 2017-06-08 VITALS
HEART RATE: 97 BPM | SYSTOLIC BLOOD PRESSURE: 124 MMHG | OXYGEN SATURATION: 94 % | TEMPERATURE: 98.6 F | RESPIRATION RATE: 16 BRPM | DIASTOLIC BLOOD PRESSURE: 64 MMHG

## 2017-06-08 VITALS
RESPIRATION RATE: 18 BRPM | TEMPERATURE: 97.8 F | HEART RATE: 107 BPM | SYSTOLIC BLOOD PRESSURE: 137 MMHG | OXYGEN SATURATION: 97 % | DIASTOLIC BLOOD PRESSURE: 65 MMHG

## 2017-06-08 VITALS
TEMPERATURE: 98.3 F | DIASTOLIC BLOOD PRESSURE: 69 MMHG | HEART RATE: 103 BPM | RESPIRATION RATE: 16 BRPM | SYSTOLIC BLOOD PRESSURE: 129 MMHG | OXYGEN SATURATION: 93 %

## 2017-06-08 VITALS
TEMPERATURE: 98.2 F | SYSTOLIC BLOOD PRESSURE: 135 MMHG | HEART RATE: 94 BPM | RESPIRATION RATE: 22 BRPM | OXYGEN SATURATION: 94 % | DIASTOLIC BLOOD PRESSURE: 63 MMHG

## 2017-06-08 VITALS — OXYGEN SATURATION: 94 %

## 2017-06-08 VITALS
TEMPERATURE: 98.3 F | HEART RATE: 113 BPM | DIASTOLIC BLOOD PRESSURE: 77 MMHG | SYSTOLIC BLOOD PRESSURE: 127 MMHG | OXYGEN SATURATION: 91 % | RESPIRATION RATE: 18 BRPM

## 2017-06-08 LAB
ALP SERPL-CCNC: 67 U/L (ref 45–117)
ALT SERPL-CCNC: 27 U/L (ref 12–78)
ANION GAP SERPL CALC-SCNC: 9 MEQ/L (ref 5–15)
AST SERPL-CCNC: 66 U/L (ref 15–37)
BASOPHILS # BLD AUTO: 0 TH/MM3 (ref 0–0.2)
BASOPHILS NFR BLD: 0.2 % (ref 0–2)
BILIRUB SERPL-MCNC: 3.4 MG/DL (ref 0.2–1)
BUN SERPL-MCNC: 6 MG/DL (ref 7–18)
CHLORIDE SERPL-SCNC: 102 MEQ/L (ref 98–107)
EOSINOPHIL # BLD: 0 TH/MM3 (ref 0–0.4)
EOSINOPHIL NFR BLD: 0.2 % (ref 0–4)
ERYTHROCYTE [DISTWIDTH] IN BLOOD BY AUTOMATED COUNT: 21.4 % (ref 11.6–17.2)
GFR SERPLBLD BASED ON 1.73 SQ M-ARVRAT: 219 ML/MIN (ref 89–?)
HCO3 BLD-SCNC: 26.7 MEQ/L (ref 21–32)
HCT VFR BLD CALC: 17.5 % (ref 39–51)
HCT VFR BLD CALC: 21.1 % (ref 39–51)
HEMO FLAGS: (no result)
HGB S BLD QL SOLY: (no result)
LYMPHOCYTES # BLD AUTO: 2.5 TH/MM3 (ref 1–4.8)
LYMPHOCYTES NFR BLD AUTO: 12.7 % (ref 9–44)
MCH RBC QN AUTO: 25.8 PG (ref 27–34)
MCHC RBC AUTO-ENTMCNC: 33.9 % (ref 32–36)
MCV RBC AUTO: 76.1 FL (ref 80–100)
MONOCYTES NFR BLD: 8.4 % (ref 0–8)
NEUTROPHILS # BLD AUTO: 15.4 TH/MM3 (ref 1.8–7.7)
NEUTROPHILS NFR BLD AUTO: 78.5 % (ref 16–70)
NEUTS BAND # BLD MANUAL: 15.7 TH/MM3 (ref 1.8–7.7)
NEUTS BAND NFR BLD: 5 % (ref 0–6)
NEUTS SEG NFR BLD MANUAL: 75 % (ref 16–70)
PLAT MORPH BLD: NORMAL
PLATELET # BLD: 185 TH/MM3 (ref 150–450)
PLATELET BLD QL SMEAR: NORMAL
POLYCHROMASIA BLD QL SMEAR: 3.3 % (ref 0–1.9)
POTASSIUM SERPL-SCNC: 3.5 MEQ/L (ref 3.5–5.1)
RBC # BLD AUTO: 2.3 MIL/MM3 (ref 4.5–5.9)
RETICS/RBC NFR: 14.8 % (ref 0.4–3)
REVIEW FLAG: (no result)
REVIEW FLAG: (no result)
SCAN/DIFF: (no result)
SODIUM SERPL-SCNC: 138 MEQ/L (ref 136–145)
TARGETS BLD QL SMEAR: (no result)
WBC # BLD AUTO: 19.6 TH/MM3 (ref 4–11)
WBC DIFF SAMPLE: 100
WBC NRBC COR # BLD: 26 /100 WBC (ref 0–0)

## 2017-06-08 PROCEDURE — 30233N1 TRANSFUSION OF NONAUTOLOGOUS RED BLOOD CELLS INTO PERIPHERAL VEIN, PERCUTANEOUS APPROACH: ICD-10-PCS | Performed by: FAMILY MEDICINE

## 2017-06-08 RX ADMIN — Medication SCH ML: at 08:05

## 2017-06-08 RX ADMIN — ACYCLOVIR SCH TAB: 800 TABLET ORAL at 08:05

## 2017-06-08 RX ADMIN — STANDARDIZED SENNA CONCENTRATE AND DOCUSATE SODIUM SCH TAB: 8.6; 5 TABLET, FILM COATED ORAL at 08:15

## 2017-06-08 RX ADMIN — HYDROMORPHONE HYDROCHLORIDE SCH MG: 2 INJECTION INTRAMUSCULAR; INTRAVENOUS; SUBCUTANEOUS at 08:06

## 2017-06-08 RX ADMIN — ONDANSETRON PRN MG: 2 INJECTION, SOLUTION INTRAMUSCULAR; INTRAVENOUS at 20:23

## 2017-06-08 RX ADMIN — MAGNESIUM SULFATE HEPTAHYDRATE SCH MLS/HR: 500 INJECTION, SOLUTION INTRAMUSCULAR; INTRAVENOUS at 10:10

## 2017-06-08 RX ADMIN — MAGNESIUM SULFATE HEPTAHYDRATE SCH MLS/HR: 500 INJECTION, SOLUTION INTRAMUSCULAR; INTRAVENOUS at 20:15

## 2017-06-08 RX ADMIN — HYDROMORPHONE HYDROCHLORIDE SCH MG: 2 INJECTION INTRAMUSCULAR; INTRAVENOUS; SUBCUTANEOUS at 03:59

## 2017-06-08 RX ADMIN — FOLIC ACID SCH MG: 1 TABLET ORAL at 08:05

## 2017-06-08 RX ADMIN — ASPIRIN 81 MG SCH MG: 81 TABLET ORAL at 08:05

## 2017-06-08 RX ADMIN — HYDROMORPHONE HYDROCHLORIDE SCH MG: 4 INJECTION, SOLUTION INTRAMUSCULAR; INTRAVENOUS; SUBCUTANEOUS at 23:59

## 2017-06-08 RX ADMIN — HEPARIN SODIUM SCH UNITS: 10000 INJECTION, SOLUTION INTRAVENOUS; SUBCUTANEOUS at 20:13

## 2017-06-08 RX ADMIN — HYDROMORPHONE HYDROCHLORIDE SCH MG: 2 INJECTION INTRAMUSCULAR; INTRAVENOUS; SUBCUTANEOUS at 06:05

## 2017-06-08 RX ADMIN — HYDROMORPHONE HYDROCHLORIDE SCH MG: 2 INJECTION INTRAMUSCULAR; INTRAVENOUS; SUBCUTANEOUS at 14:11

## 2017-06-08 RX ADMIN — HYDROMORPHONE HYDROCHLORIDE SCH MG: 2 INJECTION INTRAMUSCULAR; INTRAVENOUS; SUBCUTANEOUS at 12:15

## 2017-06-08 RX ADMIN — STANDARDIZED SENNA CONCENTRATE AND DOCUSATE SODIUM SCH TAB: 8.6; 5 TABLET, FILM COATED ORAL at 20:13

## 2017-06-08 RX ADMIN — HYDROMORPHONE HYDROCHLORIDE SCH MG: 4 INJECTION, SOLUTION INTRAMUSCULAR; INTRAVENOUS; SUBCUTANEOUS at 23:03

## 2017-06-08 RX ADMIN — HYDROMORPHONE HYDROCHLORIDE SCH MG: 2 INJECTION INTRAMUSCULAR; INTRAVENOUS; SUBCUTANEOUS at 18:12

## 2017-06-08 RX ADMIN — HYDROMORPHONE HYDROCHLORIDE SCH MG: 2 INJECTION INTRAMUSCULAR; INTRAVENOUS; SUBCUTANEOUS at 16:47

## 2017-06-08 RX ADMIN — HYDROXYUREA SCH MG: 500 CAPSULE ORAL at 08:06

## 2017-06-08 RX ADMIN — ONDANSETRON PRN MG: 2 INJECTION, SOLUTION INTRAMUSCULAR; INTRAVENOUS at 02:01

## 2017-06-08 RX ADMIN — HYDROMORPHONE HYDROCHLORIDE SCH MG: 2 INJECTION INTRAMUSCULAR; INTRAVENOUS; SUBCUTANEOUS at 20:14

## 2017-06-08 RX ADMIN — HEPARIN SODIUM SCH UNITS: 10000 INJECTION, SOLUTION INTRAVENOUS; SUBCUTANEOUS at 14:11

## 2017-06-08 RX ADMIN — Medication SCH ML: at 20:13

## 2017-06-08 RX ADMIN — HEPARIN SODIUM SCH UNITS: 10000 INJECTION, SOLUTION INTRAVENOUS; SUBCUTANEOUS at 06:00

## 2017-06-08 RX ADMIN — HYDROMORPHONE HYDROCHLORIDE SCH MG: 2 INJECTION INTRAMUSCULAR; INTRAVENOUS; SUBCUTANEOUS at 02:01

## 2017-06-08 RX ADMIN — HYDROMORPHONE HYDROCHLORIDE SCH MG: 2 INJECTION INTRAMUSCULAR; INTRAVENOUS; SUBCUTANEOUS at 10:07

## 2017-06-08 NOTE — MB
cc:

MONAE DONOVAN MD, RUBY ANNE E. M.D.

****

 

 

DATE OF CONSULTATION:  6/7/2017

 

YOB: 1995

 

REFERRING PHYSICIAN

Dr. Monae Donovan

 

CHIEF COMPLAINT

Dr. Donovan requests a consultation for Mr. Mcgee regarding sickle-cell disease

and acute vaso-occlusive pain crisis.

 

HISTORY OF PRESENT ILLNESS

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  He recently graduated.

He has known sickle-cell disease with multiple vaso-occlusive pain crises.  He

has had acute chest syndrome in the past and has required exchange.

 

He denies any precipitating events.  He was brought in by his girlfriend

because of crisis-type pain in his sternum, his lower back and his legs.  He

has been given a small dose of pain medication, 2 mg of Dilaudid, that did not

help his symptoms.  He became anxious and was writhing in pain at the time of

the consultation.

 

Since his admission his mother has driven in from [place].  She has

been advocating his care.  He complains of typical crisis pain.  He is

frustrated.  He has known complications from previous transfusions.  He reports

compliance with his hydroxyurea and was recently seen by Dr. Glover on May 5,

2017.  He has high pain tolerance for narcotic medication.  He is able to

receive 4 mg of Dilaudid in our outpatient clinic.  Percocet is used on a

p.r.n. basis in between crises episodes.  He does not require chronic pain

medication in between.

 

PAST MEDICAL HISTORY

1. Sickle-cell anemia.

2. Transfusion dependent hemosiderosis.

3. G6PD.

4. Gallstones.

 

PAST SURGICAL HISTORY

1. PICC line placement.

2. Dental work.

 

ALLERGIES

1. LEONARD BEANS.

2. MORPHINE.

3. SULFA.

 

FAMILY HISTORY

Mother has the trait.  Another sibling has sickle-cell disease.

 

PHYSICAL EXAMINATION

VITAL SIGNS:  Temperature 98.3, heart rate 107, respiratory rate 17, blood

pressure 116/58, saturation 96%.

GENERAL:  Mr. Mcgee is a with a slender well-developed man who is writhing in

bed, staying on all fours to alleviate and relieve the pain in his back.

HEENT:  His pupils are round and reactive to light and accommodation.  Sclera

is icteric.  Oropharynx is clear.

NECK:  Supple.

LUNGS:  Clear.

CARDIOVASCULAR:  Exam reveals tachycardia.

ABDOMEN:  Benign.

EXTREMITIES:  Lower extremities with no edema.

NEUROLOGIC:  No focal deficit.

 

LABORATORY

Labs significant for hemoglobin of 6.9, bilirubin 2.3, AST mildly elevated at

45.  Renal function is normal.  Creatinine slightly increased from baseline at

0.77.

 

ASSESSMENT AND PLAN

Mr. 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

disease and frequent vaso-occlusive pain crises.  His course is further

complicated by iron overload.

 

After discussion with the patient and his family present at the consultation

our plan is to alleviate his pain.  We will increase his pain medication to 4

mg of Dilaudid IV every 2 hours.  He will be monitored very closely.  He will

be monitored for sedation.  He is allowed to refuse pain medication if his pain

is under control and pain medication will not be given if he is sleeping.  He

is offered a PCA pump which he declined.  He reports very poor control on a PCA

pump.

 

We discussed the risk and benefit of transfusing one unit of packed red cells.

He is retic'ing well which argues against a classic crisis.  He has typical

symptoms of vaso-occlusive pain crisis that is not managed with the current

pain regimen, so his pain medication will be optimized.  Other supportive

therapy such as the oxygen, DVT prophylaxis, GI prophylaxis and oxygen will be

administered.

 

His supportive family is at his bedside.  Their questions were answered to

their satisfaction.  Dr. Glover will come in to see the patient tomorrow.

 

 

 

                              _________________________________

                              Shereen Pastrana MD

 

 

 

RAD/BT

D:  6/7/2017/8:42 PM

T:  6/8/2017/9:43 AM

Visit #:  [id number]

Job #:  [id number]

## 2017-06-08 NOTE — HHI.FPPN
Subjective


Remarks


Patient continues having pain, but significantly better now on the Dilaudid 4 

mg q2hrs. He has no coughing or sputum production. He has no abdominal pain. No 

nausea or vomiting. He continues to have chest pain, lower back pain, and thigh 

pain. Overall he reports feeling better but still has a ways to go regarding 

pain control.  (Kel Donovan MD R2)





Objective


Vitals





 Vital Signs








  Date Time  Temp Pulse Resp B/P Pulse Ox O2 Delivery O2 Flow Rate FiO2


 


6/8/17 04:00 98.3 113 18 127/77 91   


 


6/8/17 00:00 97.8 107 18 137/65 97   


 


6/7/17 20:59     97   21


 


6/7/17 20:00 98.6 111 18 148/81 91   


 


6/7/17 20:00      Room Air  


 


6/7/17 14:39 98.3 107 17 116/58 96   


 


6/7/17 14:26     96   21


 


6/7/17 13:40   20     


 


6/7/17 11:56   22  99 Nasal Cannula 3 


 


6/7/17 10:48 98.1 83 18 117/72 99 Nasal Cannula 3 


 


6/7/17 10:46   20     


 


6/7/17 10:40   25     








 I/O








 6/7/17 6/7/17 6/7/17 6/8/17 6/8/17 6/8/17





 07:00 15:00 23:00 07:00 15:00 23:00


 


Intake Total   1672 ml 1312 ml  


 


Output Total  900 ml    


 


Balance  -900 ml 1672 ml 1312 ml  


 


      


 


Intake Oral   960 ml 480 ml  


 


IV Total   712 ml 832 ml  


 


Output Urine Total  900 ml    


 


# Voids   4 2  


 


# Bowel Movements   0 0  





 (Kel Donovan MD R2)


Result Diagram:  


6/8/17 0711                                                                    

            6/8/17 0711





Imaging





Last 72 hours Impressions








Chest X-Ray 6/7/17 1009 Signed





Impressions: 





 Service Date/Time:  Wednesday, June 7, 2017 10:49 - CONCLUSION:  1. No acute 





 cardiopulmonary disease.     Rajan Garcia MD 








Objective Remarks


GEN:  normally nourished, mild distressed, much more comfortable than yesterday

, still moving around and grabbing his legs and back 


EYES: conjunctiva normal, PERRLA, EOMI, sclerae mildly icteric.


NECK:  thyroid midline, carotids symmetrical.  


LUNGS:  clear A-P, respiratory effort is normal.  


CARDIOVASCULAR:  RR without murmur or gallop.  No significant edema.  


GI/ABD:  soft without masses, without organomegaly.  Active bowel sounds


NEURO:  No focal deficits.  


SKIN:  color normal, no rashes noted.


HEME/LYMPH:  scleral icterus mild, pale palms, pale conjunctiva 


MUSC:  back is normal in appearance.  Extremities are normal in appearance.


PSYCH/MENTAL STATUS:  Alert and oriented x 3. (Kel Donovan MD R2)





A/P


Assessment and Plan


21 year old male admitted with sickle cell pain crisis.


Discharge Planning


Pending good pain control (Kel Donovan MD R2)


Attending Attestation


Patient seen and examined.  Case reviewed and discussed with the resident team.

  Agree with plan of care as discussed with me and documented in the resident 

note. (Yazmin South MD)


Problem List:  


(1) Sickle cell pain crisis


Status:  Acute


Plan:  Sickle cell pain crisis located in bilateral thighs, chest, and lower 

back. No coughing or consolidations. Chest x-ray negative. No fevers or chills. 

Hemoglobin 6.9 (baseline 7.5-8.0), retic count elevated, bilirubin elevated, 

mild scleral icterus present. This morning hgb down to 5.9, retic count 14.8, 

abs retic 340.9. 





- Consulted hematology, known to Dr. Glover 


- Pain management: Titrated up to Dilaudid 4 mg q2hrs. Refuses PCA pump. 


- Wean pain medication as tolerated.  


- IVF - not dehydrated on exam, will give D5 1/2 NS at 100 mls/hr. 


- Transfusing 1 unit PRBC per hematology, as it helps his pain per patient 

report. 


- Low threshold for CT and/or antibiotics if suspicious for acute chest 

syndrome. 


- Continue folic acid, multivitamin


- Continue Hydroxyurea.  


- Daily aspirin 81 mg. 








(2) DVT prophylaxis


Status:  Acute


Plan:  - Heparin 5000 units tid 





(3) Nutrition, metabolism, and development symptoms


Status:  Acute


Plan:  - Regular diet 


- IVF: D5 1/2 NS at 100 mls/hr 


 (Kel Donovan MD R2)








Kel Donovan MD R2 Jun 8, 2017 09:52


Yazmin South MD Jun 8, 2017 13:28

## 2017-06-08 NOTE — PD.ONC.PN
Subjective


Subjective


Remarks


Afebrile overnight


"I feel 10 times better than yesterday but still having a lot of back pain."


Getting PRBC infusion today.





Objective


Data











  Date Time  Temp Pulse Resp B/P Pulse Ox O2 Delivery O2 Flow Rate FiO2


 


6/8/17 12:00 98.3 103 16 129/69 93   


 


6/8/17 11:27     94   


 


6/8/17 08:18      Nasal Cannula 2.00 


 


6/8/17 08:00 98.5 91 18 119/57 92   


 


6/8/17 04:00 98.3 113 18 127/77 91   


 


6/8/17 00:00 97.8 107 18 137/65 97   


 


6/7/17 20:59     97   21


 


6/7/17 20:00 98.6 111 18 148/81 91   


 


6/7/17 20:00      Room Air  








Result Diagram:  


6/8/17 0711                                                                    

            6/8/17 0711





Laboratory Results





Laboratory Tests








Test 6/7/17 6/8/17 6/8/17





 20:40 07:11 14:28


 


Hemoglobin 6.2 GM/DL 5.9 GM/DL 


 


Hematocrit 18.7 % 17.5 % 


 


White Blood Count  19.6 TH/MM3 


 


Red Blood Count  2.30 MIL/MM3 


 


Mean Corpuscular Volume  76.1 FL 


 


Mean Corpuscular Hemoglobin  25.8 PG 


 


Mean Corpuscular Hemoglobin  33.9 % 





Concent   


 


Red Cell Distribution Width  21.4 % 


 


Platelet Count  185 TH/MM3 


 


Mean Platelet Volume  8.0 FL 


 


Neutrophils (%) (Auto)  78.5 % 


 


Lymphocytes (%) (Auto)  12.7 % 


 


Monocytes (%) (Auto)  8.4 % 


 


Eosinophils (%) (Auto)  0.2 % 


 


Basophils (%) (Auto)  0.2 % 


 


Neutrophils # (Auto)  15.4 TH/MM3 


 


Lymphocytes # (Auto)  2.5 TH/MM3 


 


Monocytes # (Auto)  1.7 TH/MM3 


 


Eosinophils # (Auto)  0.0 TH/MM3 


 


Basophils # (Auto)  0.0 TH/MM3 


 


CBC Comment  AUTO DIFF  


 


Differential Total Cells  100  





Counted   


 


Neutrophils % (Manual)  75 % 


 


Band Neutrophils %  5 % 


 


Lymphocytes %  12 % 


 


Monocytes %  8 % 


 


Neutrophils # (Manual)  15.7 TH/MM3 


 


Nucleated Red Blood Cells  26 /100 WBC 


 


Differential Comment  FINAL DIFF 





  MANUAL 


 


Platelet Estimate  NORMAL  


 


Platelet Morphology Comment  NORMAL  


 


Polychromasia  3.3 % 


 


Sickle Cells  1+  


 


Target Cells  2+  


 


Reticulocyte Count  14.8 % 


 


Absolute Reticulocyte Count  340.9 MIL/L 


 


Sodium Level  138 MEQ/L 


 


Potassium Level  3.5 MEQ/L 


 


Chloride Level  102 MEQ/L 


 


Carbon Dioxide Level  26.7 MEQ/L 


 


Anion Gap  9 MEQ/L 


 


Blood Urea Nitrogen  6 MG/DL 


 


Creatinine  0.56 MG/DL 


 


Estimat Glomerular Filtration  219 ML/MIN 





Rate   


 


Random Glucose  110 MG/DL 


 


Calcium Level  9.1 MG/DL 


 


Total Bilirubin  3.4 MG/DL 


 


Aspartate Amino Transf  66 U/L 





(AST/SGOT)   


 


Alanine Aminotransferase  27 U/L 





(ALT/SGPT)   


 


Alkaline Phosphatase  67 U/L 


 


Total Protein  7.7 GM/DL 


 


Albumin  4.6 GM/DL 


 


Blood Type  AB POSITIVE  


 


Antibody Screen  NEGATIVE  


 


Crossmatch  Leukocyte-Reduced Leukocyte-Reduced





  Red Blood Red Blood





  Cells Cells


 


Blood Bank Comment     











Administered Medications








 Medications


  (Trade)  Dose


 Ordered  Sig/Maisha


 Route


 PRN Reason  Start Time


 Stop Time Status Last Admin


Dose Admin


 


 Sodium Chloride 2


 ml  2 ml  BID


 IV FLUSH


   6/7/17 21:00


    6/8/17 08:05


 


 


 Dextrose/Sodium


 Chloride


  (D5W-1/2 NS 1000


 ml Inj)  1,000 ml @ 


 100 mls/hr  Q10H


 IV


   6/7/17 13:47


    6/8/17 10:10


 


 


 Ondansetron HCl


  (Zofran Inj)  4 mg  Q6H  PRN


 IV


 NAUSEA  6/7/17 14:00


    6/8/17 02:01


 


 


 Folic Acid


  (Folate)  1 mg  DAILY


 PO


   6/8/17 09:00


    6/8/17 08:05


 


 


 Multivitamins


  (Theragran)  1 tab  DAILY


 PO


   6/8/17 09:00


    6/8/17 08:05


 


 


 Hydroxyurea


  (Hydrea)  500 mg  DAILY


 PO


   6/8/17 09:00


    6/8/17 08:06


 


 


 Hydromorphone HCl


  (Dilaudid Pf Inj)  4 mg  Q2HR


 IV PUSH


   6/7/17 22:00


 6/8/17 21:59  6/8/17 14:11


 


 


 Aspirin


  (Aspirin Chew)  81 mg  DAILY


 CHEW


   6/7/17 21:15


    6/8/17 08:05


 








Objective Remarks


GENERAL: Young male, walking around room in no distress.


SKIN: Warm and dry.


HEAD: Normocephalic.


EYES: No injection or drainage. 


NECK: Supple, trachea midline. 


CARDIOVASCULAR: +murmur heard loudest over the L sternal border. +S1/S2. Tachy.


RESPIRATORY: Clear posteriorly. Breathing unlabored.


GASTROINTESTINAL: Abdomen soft, non-tender, nondistended. 


EXTREMITIES: No cyanosis, or edema. 


NEUROLOGICAL: No obvious focal deficit. Awake, alert, and oriented x3.





Assessment/Plan


Problem List:  


(1) Sickle cell pain crisis


Status:  Acute


Plan:  6/8: Pt hgb 5.9 today. Will transfuse 1 unit PRBC's. Pain improved. 

Continue current pain regimen, IVF. 





Assessment


22 y/o male with history of sickle cell disease presents to the ER with a 2 day 

history of worsening back pain.


Attending Statement


Complaining of back pain


Denies any shortness of breath or chest pain


No evidence of chest syndrome


Blood transfusion today


Continue Hydromorphone


Continue Hydrea


Will follow








Marie Miranda Jun 8, 2017 15:24


Deanna Glover MD Jun 9, 2017 06:21

## 2017-06-09 VITALS
DIASTOLIC BLOOD PRESSURE: 62 MMHG | RESPIRATION RATE: 18 BRPM | HEART RATE: 103 BPM | OXYGEN SATURATION: 92 % | TEMPERATURE: 98.8 F | SYSTOLIC BLOOD PRESSURE: 133 MMHG

## 2017-06-09 VITALS
HEART RATE: 117 BPM | TEMPERATURE: 99.5 F | DIASTOLIC BLOOD PRESSURE: 76 MMHG | RESPIRATION RATE: 22 BRPM | OXYGEN SATURATION: 96 % | SYSTOLIC BLOOD PRESSURE: 122 MMHG

## 2017-06-09 VITALS
SYSTOLIC BLOOD PRESSURE: 126 MMHG | TEMPERATURE: 99.2 F | RESPIRATION RATE: 18 BRPM | DIASTOLIC BLOOD PRESSURE: 71 MMHG | OXYGEN SATURATION: 93 % | HEART RATE: 112 BPM

## 2017-06-09 VITALS
DIASTOLIC BLOOD PRESSURE: 58 MMHG | RESPIRATION RATE: 18 BRPM | OXYGEN SATURATION: 92 % | SYSTOLIC BLOOD PRESSURE: 124 MMHG | TEMPERATURE: 98.7 F | HEART RATE: 102 BPM

## 2017-06-09 VITALS
DIASTOLIC BLOOD PRESSURE: 71 MMHG | SYSTOLIC BLOOD PRESSURE: 133 MMHG | HEART RATE: 108 BPM | TEMPERATURE: 100.3 F | OXYGEN SATURATION: 92 % | RESPIRATION RATE: 18 BRPM

## 2017-06-09 VITALS
DIASTOLIC BLOOD PRESSURE: 60 MMHG | RESPIRATION RATE: 22 BRPM | HEART RATE: 96 BPM | SYSTOLIC BLOOD PRESSURE: 127 MMHG | TEMPERATURE: 98.2 F | OXYGEN SATURATION: 97 %

## 2017-06-09 LAB
BASOPHILS # BLD AUTO: 0 TH/MM3 (ref 0–0.2)
BASOPHILS NFR BLD: 0 % (ref 0–2)
EOSINOPHIL # BLD: 0.1 TH/MM3 (ref 0–0.4)
EOSINOPHIL NFR BLD: 0.5 % (ref 0–4)
ERYTHROCYTE [DISTWIDTH] IN BLOOD BY AUTOMATED COUNT: 21.4 % (ref 11.6–17.2)
HCT VFR BLD CALC: 21 % (ref 39–51)
HEMO FLAGS: (no result)
HGB S BLD QL SOLY: (no result)
HOWELL-JOLLY BOD BLD QL SMEAR: PRESENT
LYMPHOCYTES # BLD AUTO: 1.2 TH/MM3 (ref 1–4.8)
LYMPHOCYTES NFR BLD AUTO: 6.8 % (ref 9–44)
MCH RBC QN AUTO: 26.4 PG (ref 27–34)
MCHC RBC AUTO-ENTMCNC: 34.7 % (ref 32–36)
MCV RBC AUTO: 75.9 FL (ref 80–100)
MONOCYTES NFR BLD: 8.6 % (ref 0–8)
NEUTROPHILS # BLD AUTO: 14.5 TH/MM3 (ref 1.8–7.7)
NEUTROPHILS NFR BLD AUTO: 84.1 % (ref 16–70)
NEUTS BAND # BLD MANUAL: 14.7 TH/MM3 (ref 1.8–7.7)
NEUTS BAND NFR BLD: 1 % (ref 0–6)
NEUTS SEG NFR BLD MANUAL: 84 % (ref 16–70)
PLAT MORPH BLD: NORMAL
PLATELET # BLD: 242 TH/MM3 (ref 150–450)
PLATELET BLD QL SMEAR: NORMAL
POLYCHROMASIA BLD QL SMEAR: 5.1 % (ref 0–1.9)
RBC # BLD AUTO: 2.76 MIL/MM3 (ref 4.5–5.9)
RETICS/RBC NFR: 12.5 % (ref 0.4–3)
REVIEW FLAG: (no result)
SCAN/DIFF: (no result)
TARGETS BLD QL SMEAR: (no result)
WBC # BLD AUTO: 17.3 TH/MM3 (ref 4–11)
WBC DIFF SAMPLE: 100
WBC NRBC COR # BLD: 44 /100 WBC (ref 0–0)

## 2017-06-09 RX ADMIN — STANDARDIZED SENNA CONCENTRATE AND DOCUSATE SODIUM SCH TAB: 8.6; 5 TABLET, FILM COATED ORAL at 20:32

## 2017-06-09 RX ADMIN — HYDROMORPHONE HYDROCHLORIDE SCH MG: 4 INJECTION, SOLUTION INTRAMUSCULAR; INTRAVENOUS; SUBCUTANEOUS at 08:31

## 2017-06-09 RX ADMIN — FOLIC ACID SCH MG: 1 TABLET ORAL at 08:39

## 2017-06-09 RX ADMIN — MAGNESIUM SULFATE HEPTAHYDRATE SCH MLS/HR: 500 INJECTION, SOLUTION INTRAMUSCULAR; INTRAVENOUS at 20:32

## 2017-06-09 RX ADMIN — HEPARIN SODIUM SCH UNITS: 10000 INJECTION, SOLUTION INTRAVENOUS; SUBCUTANEOUS at 14:00

## 2017-06-09 RX ADMIN — HYDROMORPHONE HYDROCHLORIDE SCH MG: 4 INJECTION, SOLUTION INTRAMUSCULAR; INTRAVENOUS; SUBCUTANEOUS at 20:32

## 2017-06-09 RX ADMIN — HYDROMORPHONE HYDROCHLORIDE SCH MG: 4 INJECTION, SOLUTION INTRAMUSCULAR; INTRAVENOUS; SUBCUTANEOUS at 06:07

## 2017-06-09 RX ADMIN — HYDROMORPHONE HYDROCHLORIDE SCH MG: 4 INJECTION, SOLUTION INTRAMUSCULAR; INTRAVENOUS; SUBCUTANEOUS at 14:50

## 2017-06-09 RX ADMIN — Medication SCH ML: at 20:32

## 2017-06-09 RX ADMIN — ASPIRIN 81 MG SCH MG: 81 TABLET ORAL at 08:39

## 2017-06-09 RX ADMIN — HYDROMORPHONE HYDROCHLORIDE SCH MG: 4 INJECTION, SOLUTION INTRAMUSCULAR; INTRAVENOUS; SUBCUTANEOUS at 16:30

## 2017-06-09 RX ADMIN — MAGNESIUM SULFATE HEPTAHYDRATE SCH MLS/HR: 500 INJECTION, SOLUTION INTRAMUSCULAR; INTRAVENOUS at 15:47

## 2017-06-09 RX ADMIN — Medication SCH ML: at 08:39

## 2017-06-09 RX ADMIN — HYDROMORPHONE HYDROCHLORIDE SCH MG: 4 INJECTION, SOLUTION INTRAMUSCULAR; INTRAVENOUS; SUBCUTANEOUS at 16:18

## 2017-06-09 RX ADMIN — ACYCLOVIR SCH TAB: 800 TABLET ORAL at 08:32

## 2017-06-09 RX ADMIN — HYDROXYUREA SCH MG: 500 CAPSULE ORAL at 08:32

## 2017-06-09 RX ADMIN — HYDROMORPHONE HYDROCHLORIDE SCH MG: 4 INJECTION, SOLUTION INTRAMUSCULAR; INTRAVENOUS; SUBCUTANEOUS at 22:31

## 2017-06-09 RX ADMIN — MAGNESIUM SULFATE HEPTAHYDRATE SCH MLS/HR: 500 INJECTION, SOLUTION INTRAMUSCULAR; INTRAVENOUS at 02:09

## 2017-06-09 RX ADMIN — HYDROMORPHONE HYDROCHLORIDE SCH MG: 4 INJECTION, SOLUTION INTRAMUSCULAR; INTRAVENOUS; SUBCUTANEOUS at 10:56

## 2017-06-09 RX ADMIN — STANDARDIZED SENNA CONCENTRATE AND DOCUSATE SODIUM SCH TAB: 8.6; 5 TABLET, FILM COATED ORAL at 08:31

## 2017-06-09 RX ADMIN — HEPARIN SODIUM SCH UNITS: 10000 INJECTION, SOLUTION INTRAVENOUS; SUBCUTANEOUS at 22:49

## 2017-06-09 RX ADMIN — HYDROMORPHONE HYDROCHLORIDE SCH MG: 4 INJECTION, SOLUTION INTRAMUSCULAR; INTRAVENOUS; SUBCUTANEOUS at 02:04

## 2017-06-09 RX ADMIN — HYDROMORPHONE HYDROCHLORIDE SCH MG: 4 INJECTION, SOLUTION INTRAMUSCULAR; INTRAVENOUS; SUBCUTANEOUS at 18:26

## 2017-06-09 RX ADMIN — HEPARIN SODIUM SCH UNITS: 10000 INJECTION, SOLUTION INTRAVENOUS; SUBCUTANEOUS at 20:33

## 2017-06-09 RX ADMIN — HEPARIN SODIUM SCH UNITS: 10000 INJECTION, SOLUTION INTRAVENOUS; SUBCUTANEOUS at 04:16

## 2017-06-09 RX ADMIN — HYDROMORPHONE HYDROCHLORIDE SCH MG: 4 INJECTION, SOLUTION INTRAMUSCULAR; INTRAVENOUS; SUBCUTANEOUS at 04:15

## 2017-06-09 NOTE — HHI.FPPN
Subjective


Remarks


Patient still with significant pain from sickle cell crisis, however, pain is 

decreasing from admission. He is requesting a decrease of Dilaudid from 4 mg 

q2hrs to 3 mg. Hemoglobin is stable, retic count appropriate. No coughing or 

shortness of breath. Pain remains in chest, lower back, and thighs, which is 

typical for his pain crises. No abdominal pain. Normal bowel movements. No 

nausea or vomiting. No diarrhea. Improving some from before.  (Kel Donovan MD R2)





Objective


Vitals





 Vital Signs








  Date Time  Temp Pulse Resp B/P Pulse Ox O2 Delivery O2 Flow Rate FiO2


 


6/9/17 12:00 99.2 112 18 126/71 93   


 


6/9/17 08:50      Room Air  


 


6/9/17 08:00 98.7 102 18 124/58 92   


 


6/9/17 05:00 99.5 117 22 122/76 96   


 


6/9/17 00:00      Room Air  


 


6/9/17 00:00 98.2 96 22 127/60 97   


 


6/8/17 20:00 98.2 94 22 135/63 94   


 


6/8/17 20:00      Room Air  


 


6/8/17 16:00 99.3 111 20 119/64 94   


 


6/8/17 16:00 98.6 97 16 124/65 93   


 


6/8/17 15:35 98.4 97 20 115/56 94   








 I/O








 6/8/17 6/8/17 6/8/17 6/9/17 6/9/17 6/9/17





 07:00 15:00 23:00 07:00 15:00 23:00


 


Intake Total 1312 ml 600 ml 1156 ml   


 


Output Total    400 ml  


 


Balance 1312 ml 600 ml 1156 ml -400 ml  


 


      


 


Intake Oral 480 ml 600 ml    


 


IV Total 832 ml  1156 ml   


 


Output Urine Total    400 ml  


 


# Voids 2 1    


 


# Bowel Movements 0 0  0  





 (Kel Donovan MD R2)


Result Diagram:  


6/9/17 0645                                                                    

            6/8/17 0711





Imaging





Last 72 hours Impressions








Chest X-Ray 6/7/17 1009 Signed





Impressions: 





 Service Date/Time:  Wednesday, June 7, 2017 10:49 - CONCLUSION:  1. No acute 





 cardiopulmonary disease.     Rajan Garcia MD 








Objective Remarks


GEN:  More comfortable than yesterday, still moving around some in pain, holds 

his lower back and legs 


EYES: conjunctiva normal, PERRLA, EOMI, sclerae mildly icteric.


NECK:  thyroid midline, carotids symmetrical.  


LUNGS:  clear A-P, respiratory effort is normal.  


CARDIOVASCULAR:  RR without murmur or gallop.  No significant edema.  


GI/ABD:  soft without masses, without organomegaly.  Active bowel sounds


NEURO:  No focal deficits.  


SKIN:  color normal, no rashes noted.


HEME/LYMPH:  scleral icterus mild, pale palms, pale conjunctiva 


MUSC:  back is normal in appearance.  Extremities are normal in appearance.


PSYCH/MENTAL STATUS:  Alert and oriented x 3. (Kel Donovan MD R2)





A/P


Assessment and Plan


21 year old male admitted with sickle cell pain crisis.


Discharge Planning


Pending good pain control, stable hemoglobin  (Kel Donovan MD R2)


Attending Attestation


Patient seen and examined.  Case reviewed and discussed with the resident team.

  Agree with plan of care as discussed with me and documented in the resident 

note. (Yazmin South MD)


Problem List:  


(1) Sickle cell pain crisis


Status:  Acute


Plan:  Sickle cell pain crisis located in bilateral thighs, chest, and lower 

back. No coughing or consolidations. Chest x-ray negative. No fevers or chills. 

Bilirubin elevated, mild scleral icterus present. Hemoglobin stable, retic 

count is appropriately, not in aplastic crisis. Received one unit PRBC and 

reports that made him feel better. 





- Consulted hematology, known to Dr. Glover 


- Pain management: Weaned to Dilaudid 3 mg q2hrs. Refuses PCA pump. 


- Wean pain medication as tolerated.  Monitor respiratory status carefully 

while on opiates. 


- IVF - not dehydrated on exam, will give D5 1/2 NS at 100 mls/hr. 


- Transfused 1 unit PRBC per hematology, as it helps his pain per patient 

report. 


- Low threshold for CT and/or antibiotics if suspicious for acute chest 

syndrome. 


- Continue folic acid, multivitamin


- Continue Hydroxyurea.  


- Daily aspirin 81 mg. 








(2) DVT prophylaxis


Status:  Acute


Plan:  - Heparin 5000 units tid 





(3) Nutrition, metabolism, and development symptoms


Status:  Acute


Plan:  - Regular diet 


- IVF: D5 1/2 NS at 100 mls/hr 


 (Kel Donovan MD R2)








Kel Donovan MD R2 Jun 9, 2017 14:15


Yazmin South MD Jun 9, 2017 15:49

## 2017-06-09 NOTE — PD.ONC.PN
Subjective


Subjective


Remarks


Afebrile overnight. 


Feeling better but still with pain in legs chest and back. 


Girlfriend at bedside.





Objective


Data











  Date Time  Temp Pulse Resp B/P Pulse Ox O2 Delivery O2 Flow Rate FiO2


 


6/9/17 12:00 99.2 112 18 126/71 93   


 


6/9/17 08:50      Room Air  


 


6/9/17 08:00 98.7 102 18 124/58 92   


 


6/9/17 05:00 99.5 117 22 122/76 96   


 


6/9/17 00:00      Room Air  


 


6/9/17 00:00 98.2 96 22 127/60 97   


 


6/8/17 20:00 98.2 94 22 135/63 94   


 


6/8/17 20:00      Room Air  


 


6/8/17 16:00 99.3 111 20 119/64 94   


 


6/8/17 16:00 98.6 97 16 124/65 93   


 


6/8/17 15:35 98.4 97 20 115/56 94   














 6/9/17 6/9/17 6/9/17





 07:00 15:00 23:00


 


Output Total 400 ml  


 


Balance -400 ml  








Result Diagram:  


6/9/17 0645                                                                    

            6/8/17 0711





Laboratory Results





Laboratory Tests








Test 6/8/17 6/8/17 6/9/17





 14:28 21:28 06:45


 


Crossmatch Leukocyte-Reduced  





 Red Blood  





 Cells  


 


Blood Bank Comment    


 


Hemoglobin  7.3 GM/DL 7.3 GM/DL


 


Hematocrit  21.1 % 21.0 %


 


White Blood Count   17.3 TH/MM3


 


Red Blood Count   2.76 MIL/MM3


 


Mean Corpuscular Volume   75.9 FL


 


Mean Corpuscular Hemoglobin   26.4 PG


 


Mean Corpuscular Hemoglobin   34.7 %





Concent   


 


Red Cell Distribution Width   21.4 %


 


Platelet Count   242 TH/MM3


 


Mean Platelet Volume   8.2 FL


 


Neutrophils (%) (Auto)   84.1 %


 


Lymphocytes (%) (Auto)   6.8 %


 


Monocytes (%) (Auto)   8.6 %


 


Eosinophils (%) (Auto)   0.5 %


 


Basophils (%) (Auto)   0.0 %


 


Neutrophils # (Auto)   14.5 TH/MM3


 


Lymphocytes # (Auto)   1.2 TH/MM3


 


Monocytes # (Auto)   1.5 TH/MM3


 


Eosinophils # (Auto)   0.1 TH/MM3


 


Basophils # (Auto)   0.0 TH/MM3


 


CBC Comment   AUTO DIFF 


 


Differential Total Cells   100 





Counted   


 


Neutrophils % (Manual)   84 %


 


Band Neutrophils %   1 %


 


Lymphocytes %   12 %


 


Monocytes %   3 %


 


Neutrophils # (Manual)   14.7 TH/MM3


 


Nucleated Red Blood Cells   44 /100 WBC


 


Differential Comment   FINAL DIFF





   MANUAL


 


Atypical Lymphocytes    %


 


Platelet Estimate   NORMAL 


 


Platelet Morphology Comment   NORMAL 


 


Polychromasia   5.1 %


 


Sickle Cells   1+ 


 


Target Cells   2+ 


 


[place] Bodies   PRESENT 


 


Red Cell Morphology Comment    


 


Reticulocyte Count   12.5 %


 


Absolute Reticulocyte Count   355.1 MIL/L











Administered Medications








 Medications


  (Trade)  Dose


 Ordered  Sig/Maisha


 Route


 PRN Reason  Start Time


 Stop Time Status Last Admin


Dose Admin


 


 Sodium Chloride


  (NS Flush)  2 ml  UNSCH  PRN


 IVF


 FLUSH AFTER USING IV ACCESS  6/7/17 10:00


    6/9/17 04:14


 


 


 Sodium Chloride 2


 ml  2 ml  BID


 IV FLUSH


   6/7/17 21:00


    6/9/17 08:39


 


 


 Dextrose/Sodium


 Chloride


  (D5W-1/2 NS 1000


 ml Inj)  1,000 ml @ 


 100 mls/hr  Q10H


 IV


   6/7/17 13:47


    6/9/17 02:09


 


 


 Ondansetron HCl


  (Zofran Inj)  4 mg  Q6H  PRN


 IV


 NAUSEA  6/7/17 14:00


    6/8/17 20:23


 


 


 Folic Acid


  (Folate)  1 mg  DAILY


 PO


   6/8/17 09:00


    6/8/17 08:05


 


 


 Multivitamins


  (Theragran)  1 tab  DAILY


 PO


   6/8/17 09:00


    6/9/17 08:32


 


 


 Diphenhydramine


 HCl


  (Benadryl)  25 mg  Q4H  PRN


 PO


 ITCHING  6/7/17 14:00


    6/8/17 20:24


 


 


 Hydroxyurea


  (Hydrea)  500 mg  DAILY


 PO


   6/8/17 09:00


    6/9/17 08:32


 


 


 Senna/Docusate


 Sodium


  (Blanca-Colace)  1 tab  BID


 PO


   6/8/17 09:00


    6/9/17 08:31


 


 


 Aspirin


  (Aspirin Chew)  81 mg  DAILY


 CHEW


   6/7/17 21:15


    6/8/17 08:05


 


 


 Hydromorphone HCl


  (Dilaudid Pf Inj)  3 mg  Q2H


 IV PUSH


   6/9/17 08:30


    6/9/17 10:56


 








Objective Remarks


GENERAL: Young man, sitting up on side of bed. 


SKIN: Warm and dry.


HEAD: Normocephalic.


EYES: no injection or drainage. 


NECK: Supple, trachea midline.


CARDIOVASCULAR: Regular rate and rhythm


RESPIRATORY: Breath sounds equal bilaterally. No accessory muscle use.


GASTROINTESTINAL: Abdomen soft, non-tender, nondistended. 


EXTREMITIES: No cyanosis


NEUROLOGICAL: No obvious focal deficit. Awake, alert, and oriented x3.





Assessment/Plan


Problem List:  


(1) Sickle cell pain crisis


Status:  Acute


Plan:  6/9: hgb improved.  continue pain regimen.  monitor CBC


6/8: Pt hgb 5.9 today. Will transfuse 1 unit PRBC's. Pain improved. Continue 

current pain regimen, IVF. 





Assessment


20 y/o male with history of sickle cell disease presents to the ER with a 2 day 

history of worsening back pain.


Attending Statement


Vision is still complaining of chest pain leg pain and back pain. However it is 

improving


Patient wants to decrease hydromorphone


Anxious to go home


Continue present plan


Will follow








Bobbi Yeboah Jun 9, 2017 13:03


Deanna Glover MD Yung 10, 2017 06:59

## 2017-06-10 VITALS
RESPIRATION RATE: 18 BRPM | TEMPERATURE: 98.2 F | HEART RATE: 94 BPM | SYSTOLIC BLOOD PRESSURE: 130 MMHG | DIASTOLIC BLOOD PRESSURE: 74 MMHG | OXYGEN SATURATION: 99 %

## 2017-06-10 VITALS
HEART RATE: 97 BPM | TEMPERATURE: 98.2 F | DIASTOLIC BLOOD PRESSURE: 58 MMHG | SYSTOLIC BLOOD PRESSURE: 123 MMHG | OXYGEN SATURATION: 96 % | RESPIRATION RATE: 18 BRPM

## 2017-06-10 VITALS
RESPIRATION RATE: 18 BRPM | TEMPERATURE: 98.4 F | SYSTOLIC BLOOD PRESSURE: 133 MMHG | DIASTOLIC BLOOD PRESSURE: 72 MMHG | HEART RATE: 94 BPM | OXYGEN SATURATION: 97 %

## 2017-06-10 VITALS
TEMPERATURE: 98.2 F | RESPIRATION RATE: 18 BRPM | HEART RATE: 115 BPM | OXYGEN SATURATION: 92 % | DIASTOLIC BLOOD PRESSURE: 87 MMHG | SYSTOLIC BLOOD PRESSURE: 145 MMHG

## 2017-06-10 VITALS
OXYGEN SATURATION: 97 % | HEART RATE: 94 BPM | TEMPERATURE: 98.5 F | RESPIRATION RATE: 20 BRPM | SYSTOLIC BLOOD PRESSURE: 137 MMHG | DIASTOLIC BLOOD PRESSURE: 64 MMHG

## 2017-06-10 VITALS
HEART RATE: 117 BPM | DIASTOLIC BLOOD PRESSURE: 81 MMHG | TEMPERATURE: 98.6 F | OXYGEN SATURATION: 95 % | RESPIRATION RATE: 18 BRPM | SYSTOLIC BLOOD PRESSURE: 138 MMHG

## 2017-06-10 LAB
ALP SERPL-CCNC: 62 U/L (ref 45–117)
ALT SERPL-CCNC: 23 U/L (ref 12–78)
ANION GAP SERPL CALC-SCNC: 12 MEQ/L (ref 5–15)
AST SERPL-CCNC: 51 U/L (ref 15–37)
BILIRUB SERPL-MCNC: 4.9 MG/DL (ref 0.2–1)
BUN SERPL-MCNC: 3 MG/DL (ref 7–18)
CHLORIDE SERPL-SCNC: 98 MEQ/L (ref 98–107)
ERYTHROCYTE [DISTWIDTH] IN BLOOD BY AUTOMATED COUNT: 20.8 % (ref 11.6–17.2)
GFR SERPLBLD BASED ON 1.73 SQ M-ARVRAT: 403 ML/MIN (ref 89–?)
HCO3 BLD-SCNC: 28.5 MEQ/L (ref 21–32)
HCT VFR BLD CALC: 20.6 % (ref 39–51)
MCH RBC QN AUTO: 25.6 PG (ref 27–34)
MCHC RBC AUTO-ENTMCNC: 33.2 % (ref 32–36)
MCV RBC AUTO: 77.1 FL (ref 80–100)
PLATELET # BLD: 214 TH/MM3 (ref 150–450)
POTASSIUM SERPL-SCNC: 3.5 MEQ/L (ref 3.5–5.1)
RBC # BLD AUTO: 2.67 MIL/MM3 (ref 4.5–5.9)
RETICS/RBC NFR: 9.7 % (ref 0.4–3)
REVIEW FLAG: (no result)
SODIUM SERPL-SCNC: 138 MEQ/L (ref 136–145)
WBC # BLD AUTO: 14.5 TH/MM3 (ref 4–11)

## 2017-06-10 RX ADMIN — HYDROMORPHONE HYDROCHLORIDE SCH MG: 4 INJECTION, SOLUTION INTRAMUSCULAR; INTRAVENOUS; SUBCUTANEOUS at 00:32

## 2017-06-10 RX ADMIN — MAGNESIUM SULFATE HEPTAHYDRATE SCH MLS/HR: 500 INJECTION, SOLUTION INTRAMUSCULAR; INTRAVENOUS at 06:22

## 2017-06-10 RX ADMIN — HEPARIN SODIUM SCH UNITS: 10000 INJECTION, SOLUTION INTRAVENOUS; SUBCUTANEOUS at 20:52

## 2017-06-10 RX ADMIN — HYDROMORPHONE HYDROCHLORIDE SCH MG: 4 INJECTION, SOLUTION INTRAMUSCULAR; INTRAVENOUS; SUBCUTANEOUS at 10:40

## 2017-06-10 RX ADMIN — HYDROMORPHONE HYDROCHLORIDE SCH MG: 4 INJECTION, SOLUTION INTRAMUSCULAR; INTRAVENOUS; SUBCUTANEOUS at 08:18

## 2017-06-10 RX ADMIN — HYDROMORPHONE HYDROCHLORIDE SCH MG: 4 INJECTION, SOLUTION INTRAMUSCULAR; INTRAVENOUS; SUBCUTANEOUS at 18:00

## 2017-06-10 RX ADMIN — HEPARIN SODIUM SCH UNITS: 10000 INJECTION, SOLUTION INTRAVENOUS; SUBCUTANEOUS at 14:00

## 2017-06-10 RX ADMIN — STANDARDIZED SENNA CONCENTRATE AND DOCUSATE SODIUM SCH TAB: 8.6; 5 TABLET, FILM COATED ORAL at 08:17

## 2017-06-10 RX ADMIN — HYDROXYUREA SCH MG: 500 CAPSULE ORAL at 08:42

## 2017-06-10 RX ADMIN — ACYCLOVIR SCH TAB: 800 TABLET ORAL at 08:29

## 2017-06-10 RX ADMIN — HYDROMORPHONE HYDROCHLORIDE SCH MG: 4 INJECTION, SOLUTION INTRAMUSCULAR; INTRAVENOUS; SUBCUTANEOUS at 22:30

## 2017-06-10 RX ADMIN — HYDROMORPHONE HYDROCHLORIDE SCH MG: 4 INJECTION, SOLUTION INTRAMUSCULAR; INTRAVENOUS; SUBCUTANEOUS at 20:52

## 2017-06-10 RX ADMIN — Medication SCH ML: at 08:18

## 2017-06-10 RX ADMIN — HYDROMORPHONE HYDROCHLORIDE SCH MG: 4 INJECTION, SOLUTION INTRAMUSCULAR; INTRAVENOUS; SUBCUTANEOUS at 06:22

## 2017-06-10 RX ADMIN — ASPIRIN 81 MG SCH MG: 81 TABLET ORAL at 08:28

## 2017-06-10 RX ADMIN — HYDROMORPHONE HYDROCHLORIDE SCH MG: 4 INJECTION, SOLUTION INTRAMUSCULAR; INTRAVENOUS; SUBCUTANEOUS at 12:26

## 2017-06-10 RX ADMIN — STANDARDIZED SENNA CONCENTRATE AND DOCUSATE SODIUM SCH TAB: 8.6; 5 TABLET, FILM COATED ORAL at 20:51

## 2017-06-10 RX ADMIN — Medication SCH ML: at 20:51

## 2017-06-10 RX ADMIN — HYDROMORPHONE HYDROCHLORIDE SCH MG: 4 INJECTION, SOLUTION INTRAMUSCULAR; INTRAVENOUS; SUBCUTANEOUS at 15:06

## 2017-06-10 RX ADMIN — HYDROMORPHONE HYDROCHLORIDE SCH MG: 4 INJECTION, SOLUTION INTRAMUSCULAR; INTRAVENOUS; SUBCUTANEOUS at 02:15

## 2017-06-10 RX ADMIN — FOLIC ACID SCH MG: 1 TABLET ORAL at 08:28

## 2017-06-10 RX ADMIN — MAGNESIUM SULFATE HEPTAHYDRATE SCH MLS/HR: 500 INJECTION, SOLUTION INTRAMUSCULAR; INTRAVENOUS at 20:52

## 2017-06-10 RX ADMIN — HYDROMORPHONE HYDROCHLORIDE SCH MG: 4 INJECTION, SOLUTION INTRAMUSCULAR; INTRAVENOUS; SUBCUTANEOUS at 04:38

## 2017-06-10 RX ADMIN — HEPARIN SODIUM SCH UNITS: 10000 INJECTION, SOLUTION INTRAVENOUS; SUBCUTANEOUS at 21:28

## 2017-06-10 NOTE — HHI.DCPOC
Discharge Care Plan


Diagnosis:  


(1) Sickle cell pain crisis


Goals to Promote Your Health


* To prevent worsening of your condition and complications


* To maintain your health at the optimal level


Directions to Meet Your Goals


*** Take your medications as prescribed


*** Follow your dietary instruction


*** Follow activity as directed








*** Keep your appointments as scheduled


*** Take your immunizations and boosters as scheduled


*** If your symptoms worsen call your PCP, if no PCP go to Urgent Care Center 

or Emergency Room***


*** Smoking is Dangerous to Your Health. Avoid second hand smoke***


***Call the 24-hour hour crisis hotline for domestic abuse at 1-721.205.6080***








Jose Hernandez MD R1 Yung 10, 2017 10:17

## 2017-06-10 NOTE — HHI.FPPN
Subjective


Remarks


Patient seen and examined this morning. No acute events with temperature up to 

100.3, but has since decreased without intervention. Patient states that 

yesterday after decreasing his pain medication the pain in his lower back and 

chest greatly increased in severity. Currently he rates the pain as 7 out of 10 

this chest, lower back, and upper lower extremities. Discussed with patient 

possibility of adding oxycodone to regimen for extended relief, however he 

refuses as he states that "it doesn't work for him." He also states that he 

would rather be discharged then weaned off the IV pain medication. During the 

interview and examination patient had multiple episodes of coughing with 2 

episodes of hemoptysis noted by the patient as new onset. Otherwise he has no 

complaints and denies any chills, shortness of breath, chest pain, NVD, 

abdominal pain, or calf tenderness. (Jose Hernandez MD R1)





Objective


Vitals





 Vital Signs








  Date Time  Temp Pulse Resp B/P Pulse Ox O2 Delivery O2 Flow Rate FiO2


 


6/10/17 08:00 98.2 97 18 123/58 96   


 


6/10/17 08:00      Nasal Cannula 2.00 


 


6/10/17 04:00 98.2 115 18 145/87 92   


 


6/10/17 00:30 98.6 117 18 138/81 95   


 


6/9/17 20:06 100.3 108 18 133/71 92   


 


6/9/17 20:00      Room Air  


 


6/9/17 16:00 98.8 103 18 133/62 92   


 


6/9/17 12:00 99.2 112 18 126/71 93   








 I/O








 6/9/17 6/9/17 6/9/17 6/10/17 6/10/17 6/10/17





 07:00 15:00 23:00 07:00 15:00 23:00


 


Intake Total  720 ml 781 ml 802 ml  


 


Output Total 400 ml  400 ml 1000 ml  


 


Balance -400 ml 720 ml 381 ml -198 ml  


 


      


 


Intake Oral  720 ml    


 


IV Total   781 ml 802 ml  


 


Output Urine Total 400 ml  400 ml 1000 ml  


 


# Voids  4    


 


# Bowel Movements 0     





 (Jose Hernandez MD R1)


Result Diagram:  


6/10/17 0634                                                                   

             6/10/17 0634





Objective Remarks


GEN:  21-year-old male sitting up in bed clutching his back and lower 

extremities in pain, but does appear more comfortable than prior examinations.


HEENT: Atraumatic, normocephalic with EOMI. MMM. Oropharynx clear, but patient 

did have 2 episodes of bright red hemoptysis. No JVD or LAD appreciated


LUNGS: Clear to auscultation bilaterally with no CRW. No increased work of 

breathing


CARDIOVASCULAR:  RR without murmur or gallop.  No significant edema.  


GI/ABD: Soft, nondistended with positive bowel sounds. No masses appreciated.


NEURO:  No focal deficits.  


SKIN: Warm and dry without rash.


HEME/LYMPH:  Scleral icterus mild, pale palms, pale conjunctiva 


MUSC:  Back is normal in appearance.  Extremities are normal in appearance.


PSYCH/MENTAL STATUS:  Alert and oriented x 3. (Jose Hernandez MD R1)





A/P


Assessment and Plan


21 year old male admitted with sickle cell pain crisis.


Discharge Planning


Pending chest x-ray for possible acute chest syndrome as patient had hemoptysis 

this morning. Otherwise patient states that he would rather be discharged then 

stay in the hospital for weaning of his pain medication. Patient is known to 

hematology service who agrees with possible discharge pending further workup. (

Jose Hernandez MD R1)


Attending Attestation


Patient seen and examined.  Case reviewed and discussed with the resident team.

  Agree with plan of care as discussed with me and documented in the resident 

note. (Yazmin South MD)


Problem List:  


(1) Sickle cell pain crisis


Status:  Acute


Plan:  Sickle cell pain crisis located in bilateral thighs, chest, and lower 

back. No coughing or consolidations. Chest x-ray negative. No fevers or chills. 

Bilirubin elevated, mild scleral icterus present. Hemoglobin stable, retic 

count is appropriately, not in aplastic crisis. Received one unit PRBC and 

reports that made him feel better. Patient with 2 episodes of hemoptysis on 6/

10. 





- Consulted hematology, known to Dr. Glover 


- Pain management: Increase Dilaudid to 4 mg every 2 hours. Refuses PCA pump or 

any oral medication. 


- Patient will be discharged with oxycodone 103 20/5/20 tablets every 6 hours 

when necessary for pain pending further workup.


- Wean pain medication as tolerated.  Monitor respiratory status carefully 

while on opiates. 


- IVF - not dehydrated on exam, will give D5 1/2 NS at 100 mls/hr. 


- Transfused 2 unit PRBC per hematology, as it helps his pain per patient 

report. 


- Low threshold for CT and/or antibiotics if suspicious for acute chest 

syndrome. 


- Continue folic acid, multivitamin


- Continue Hydroxyurea.  


- Daily aspirin 81 mg. 





- Chest x-ray 6/10: pending





(2) DVT prophylaxis


Status:  Acute


Plan:  - Heparin 5000 units tid (will be discontinued upon possible discharge)





(3) Nutrition, metabolism, and development symptoms


Status:  Acute


Plan:  - Regular diet 


- IVF: D5 1/2 NS at 100 mls/hr 


 (Jose Hernandez MD R1)








Jose Hernandez MD R1 Yung 10, 2017 10:34


Yazmin South MD Yung 10, 2017 11:04

## 2017-06-10 NOTE — RADRPT
EXAM DATE/TIME:  06/10/2017 10:38 

 

[facility] COMPARISON:     

CHEST PA & LAT, September 16, 2016, 19:14.

 

                     

INDICATIONS :     

Cough and congestion. Sickle cell crisis.

                     

 

MEDICAL HISTORY :     

Sickle Cell disease.          

 

SURGICAL HISTORY :     

None.   

 

ENCOUNTER:     

Subsequent                                        

 

ACUITY:     

2 days      

 

PAIN SCORE:     

6/10

 

LOCATION:     

Bilateral chest 

 

FINDINGS:     

PA and lateral views of the chest demonstrate the lungs to be symmetrically aerated without evidence 
of mass, infiltrate or effusion.  The heart is minimally enlarged consistent with sickle cell disease
.  There is no infiltrate.  Osseous structures are intact without evidence for bone infarct..

 

CONCLUSION:     

Mild prominence to the cardiac silhouette otherwise negative.

 

 

 

 Zain Murray MD FACR on Gege 10, 2017 at 10:49           

Board Certified Radiologist.

 This report was verified electronically.

## 2017-06-10 NOTE — PD.ONC.PN
Subjective


Subjective


Remarks


Tmax 100.3 overnight. 


Has pain in right buttock and right hip joint. 


Wants to go home. 


Feels he will be better able to manage pain at home.





Objective


Data











  Date Time  Temp Pulse Resp B/P Pulse Ox O2 Delivery O2 Flow Rate FiO2


 


6/10/17 08:00 98.2 97 18 123/58 96   


 


6/10/17 08:00      Nasal Cannula 2.00 


 


6/10/17 04:00 98.2 115 18 145/87 92   


 


6/10/17 00:30 98.6 117 18 138/81 95   


 


6/9/17 20:06 100.3 108 18 133/71 92   


 


6/9/17 20:00      Room Air  


 


6/9/17 16:00 98.8 103 18 133/62 92   


 


6/9/17 12:00 99.2 112 18 126/71 93   














 6/10/17 6/10/17 6/10/17





 07:00 15:00 23:00


 


Intake Total 802 ml  


 


Output Total 1000 ml  


 


Balance -198 ml  








Result Diagram:  


6/10/17 0634                                                                   

             6/10/17 0634





Laboratory Results





Laboratory Tests








Test 6/10/17





 06:34


 


White Blood Count 14.5 TH/MM3


 


Red Blood Count 2.67 MIL/MM3


 


Hemoglobin 6.8 GM/DL


 


Hematocrit 20.6 %


 


Mean Corpuscular Volume 77.1 FL


 


Mean Corpuscular Hemoglobin 25.6 PG


 


Mean Corpuscular Hemoglobin 33.2 %





Concent 


 


Red Cell Distribution Width 20.8 %


 


Platelet Count 214 TH/MM3


 


Mean Platelet Volume 8.4 FL


 


Reticulocyte Count 9.7 %


 


Absolute Reticulocyte Count 258.3 MIL/L


 


Sodium Level 138 MEQ/L


 


Potassium Level 3.5 MEQ/L


 


Chloride Level 98 MEQ/L


 


Carbon Dioxide Level 28.5 MEQ/L


 


Anion Gap 12 MEQ/L


 


Blood Urea Nitrogen 3 MG/DL


 


Creatinine 0.33 MG/DL


 


Estimat Glomerular Filtration 403 ML/MIN





Rate 


 


Random Glucose 102 MG/DL


 


Calcium Level 8.4 MG/DL


 


Total Bilirubin 4.9 MG/DL


 


Aspartate Amino Transf 51 U/L





(AST/SGOT) 


 


Alanine Aminotransferase 23 U/L





(ALT/SGPT) 


 


Alkaline Phosphatase 62 U/L


 


Total Protein 7.1 GM/DL


 


Albumin 4.0 GM/DL











Administered Medications








 Medications


  (Trade)  Dose


 Ordered  Sig/Maisha


 Route


 PRN Reason  Start Time


 Stop Time Status Last Admin


Dose Admin


 


 Sodium Chloride


  (NS Flush)  2 ml  UNSCH  PRN


 IVF


 FLUSH AFTER USING IV ACCESS  6/7/17 10:00


    6/9/17 04:14


 


 


 Sodium Chloride 2


 ml  2 ml  BID


 IV FLUSH


   6/7/17 21:00


    6/10/17 08:18


 


 


 Dextrose/Sodium


 Chloride


  (D5W-1/2 NS 1000


 ml Inj)  1,000 ml @ 


 100 mls/hr  Q10H


 IV


   6/7/17 13:47


    6/10/17 06:22


 


 


 Ondansetron HCl


  (Zofran Inj)  4 mg  Q6H  PRN


 IV


 NAUSEA  6/7/17 14:00


    6/8/17 20:23


 


 


 Folic Acid


  (Folate)  1 mg  DAILY


 PO


   6/8/17 09:00


    6/8/17 08:05


 


 


 Multivitamins


  (Theragran)  1 tab  DAILY


 PO


   6/8/17 09:00


    6/9/17 08:32


 


 


 Diphenhydramine


 HCl


  (Benadryl)  25 mg  Q4H  PRN


 PO


 ITCHING  6/7/17 14:00


    6/8/17 20:24


 


 


 Hydroxyurea


  (Hydrea)  500 mg  DAILY


 PO


   6/8/17 09:00


    6/10/17 08:42


 


 


 Senna/Docusate


 Sodium


  (Blanca-Colace)  1 tab  BID


 PO


   6/8/17 09:00


    6/10/17 08:17


 


 


 Aspirin


  (Aspirin Chew)  81 mg  DAILY


 CHEW


   6/7/17 21:15


    6/8/17 08:05


 


 


 Hydromorphone HCl


  (Dilaudid Pf Inj)  4 mg  Q2H


 IV PUSH


   6/9/17 18:30


    6/10/17 08:18


 








Objective Remarks


GENERAL: Young man, sitting up on side of bed. 


SKIN: Warm and dry.


HEAD: Normocephalic.


EYES: no injection or drainage. 


NECK: Supple, trachea midline.


CARDIOVASCULAR: Regular rate and rhythm


RESPIRATORY: Breath sounds equal bilaterally. No accessory muscle use.


GASTROINTESTINAL: Abdomen soft, non-tender, nondistended. 


EXTREMITIES: No cyanosis


NEUROLOGICAL: awake and alert, normal speech. moving all extremities.





Assessment/Plan


Problem List:  


(1) Sickle cell pain crisis


Status:  Acute


Plan:  6/10: wants to go home, bilirubin remains elevated. hgb fell slightly. 

still hemolyzing.  still has pain but feels he can better manage it at home. 


6/9: hgb improved.  continue pain regimen.  monitor CBC


6/8: Pt hgb 5.9 today. Will transfuse 1 unit PRBC's. Pain improved. Continue 

current pain regimen, IVF. 





Assessment


20 y/o male with history of sickle cell disease presents to the ER with a 2 day 

history of worsening back pain.


Attending Statement


 Still has leg pain but it is improving


Wants to go home


Discuss with him that they should stay one more day. He agrees


If patient remains stable  then he could be discharge tomorrow


The exam, history, and the medical decision-making described in the above note 

were completed with the assistance of the mid-level provider. I reviewed and 

agree with the findings presented.  I attest that I had a face-to-face 

encounter with the patient on the same day, and personally performed and 

documented my assessment and findings in the medical record.








Bobbi Yeboah Yung 10, 2017 10:18


Deanna Glover MD Jun 11, 2017 01:55

## 2017-06-11 VITALS
OXYGEN SATURATION: 96 % | TEMPERATURE: 98.4 F | SYSTOLIC BLOOD PRESSURE: 136 MMHG | DIASTOLIC BLOOD PRESSURE: 66 MMHG | RESPIRATION RATE: 18 BRPM | HEART RATE: 97 BPM

## 2017-06-11 VITALS
TEMPERATURE: 98.9 F | RESPIRATION RATE: 18 BRPM | DIASTOLIC BLOOD PRESSURE: 67 MMHG | OXYGEN SATURATION: 99 % | HEART RATE: 95 BPM | SYSTOLIC BLOOD PRESSURE: 131 MMHG

## 2017-06-11 VITALS
RESPIRATION RATE: 20 BRPM | OXYGEN SATURATION: 98 % | DIASTOLIC BLOOD PRESSURE: 75 MMHG | SYSTOLIC BLOOD PRESSURE: 152 MMHG | TEMPERATURE: 99.4 F | HEART RATE: 103 BPM

## 2017-06-11 VITALS
TEMPERATURE: 98.4 F | OXYGEN SATURATION: 95 % | DIASTOLIC BLOOD PRESSURE: 69 MMHG | SYSTOLIC BLOOD PRESSURE: 128 MMHG | HEART RATE: 85 BPM | RESPIRATION RATE: 18 BRPM

## 2017-06-11 LAB
ANION GAP SERPL CALC-SCNC: 10 MEQ/L (ref 5–15)
BUN SERPL-MCNC: 6 MG/DL (ref 7–18)
CHLORIDE SERPL-SCNC: 100 MEQ/L (ref 98–107)
ERYTHROCYTE [DISTWIDTH] IN BLOOD BY AUTOMATED COUNT: 19.1 % (ref 11.6–17.2)
GFR SERPLBLD BASED ON 1.73 SQ M-ARVRAT: 297 ML/MIN (ref 89–?)
HCO3 BLD-SCNC: 28.7 MEQ/L (ref 21–32)
HCT VFR BLD CALC: 21.2 % (ref 39–51)
MCH RBC QN AUTO: 25.6 PG (ref 27–34)
MCHC RBC AUTO-ENTMCNC: 33.2 % (ref 32–36)
MCV RBC AUTO: 77 FL (ref 80–100)
PLATELET # BLD: 227 TH/MM3 (ref 150–450)
POTASSIUM SERPL-SCNC: 3.3 MEQ/L (ref 3.5–5.1)
RBC # BLD AUTO: 2.75 MIL/MM3 (ref 4.5–5.9)
RETICS/RBC NFR: 8.2 % (ref 0.4–3)
REVIEW FLAG: (no result)
SODIUM SERPL-SCNC: 139 MEQ/L (ref 136–145)
WBC # BLD AUTO: 12.5 TH/MM3 (ref 4–11)

## 2017-06-11 RX ADMIN — ASPIRIN 81 MG SCH MG: 81 TABLET ORAL at 08:35

## 2017-06-11 RX ADMIN — HYDROMORPHONE HYDROCHLORIDE SCH MG: 4 INJECTION, SOLUTION INTRAMUSCULAR; INTRAVENOUS; SUBCUTANEOUS at 00:30

## 2017-06-11 RX ADMIN — MAGNESIUM SULFATE HEPTAHYDRATE SCH MLS/HR: 500 INJECTION, SOLUTION INTRAMUSCULAR; INTRAVENOUS at 07:47

## 2017-06-11 RX ADMIN — ACYCLOVIR SCH TAB: 800 TABLET ORAL at 08:36

## 2017-06-11 RX ADMIN — HEPARIN SODIUM SCH UNITS: 10000 INJECTION, SOLUTION INTRAVENOUS; SUBCUTANEOUS at 12:13

## 2017-06-11 RX ADMIN — HYDROXYUREA SCH MG: 500 CAPSULE ORAL at 08:43

## 2017-06-11 RX ADMIN — HYDROMORPHONE HYDROCHLORIDE SCH MG: 4 INJECTION, SOLUTION INTRAMUSCULAR; INTRAVENOUS; SUBCUTANEOUS at 06:27

## 2017-06-11 RX ADMIN — HYDROMORPHONE HYDROCHLORIDE SCH MG: 4 INJECTION, SOLUTION INTRAMUSCULAR; INTRAVENOUS; SUBCUTANEOUS at 02:19

## 2017-06-11 RX ADMIN — FOLIC ACID SCH MG: 1 TABLET ORAL at 08:36

## 2017-06-11 RX ADMIN — HYDROMORPHONE HYDROCHLORIDE SCH MG: 4 INJECTION, SOLUTION INTRAMUSCULAR; INTRAVENOUS; SUBCUTANEOUS at 04:30

## 2017-06-11 RX ADMIN — Medication SCH ML: at 08:47

## 2017-06-11 RX ADMIN — STANDARDIZED SENNA CONCENTRATE AND DOCUSATE SODIUM SCH TAB: 8.6; 5 TABLET, FILM COATED ORAL at 08:35

## 2017-06-11 NOTE — HHI.FPPN
Subjective


Remarks


No acute events. He is continuing to receive Dilaudid 4 mg q2hrs. However, he 

reports his pain is significantly improved and he would like to attempt 

switching to PRN Dilaudid and try to wean himself off. He is strongly 

requesting to go home as he can take hot showers with Epsom salts for pain 

relief. He reports that he has gone home with this level of pain before and did 

better at home than in the hospital. he reports feeling "mountains better than 

when I got here". He appears comfortable, sitting up in bed, sometimes rubbing 

his legs, but much more comfortable than days prior. He continues to have pains 

in his chest, lower back, and thighs, but improved. He reports normal appetite 

and bowel movements. Hemoglobin is stable. Retic counts are appropriate. No 

coughing, shortness of breath, or fevers today.  (Kel Donovan MD R2)





Objective


Vitals





 Vital Signs








  Date Time  Temp Pulse Resp B/P Pulse Ox O2 Delivery O2 Flow Rate FiO2


 


6/11/17 08:00 98.4 97 18 136/66 96   


 


6/11/17 04:00 99.4 103 20 152/75 98   


 


6/11/17 00:00 98.9 95 18 131/67 99   


 


6/10/17 20:00 98.5 94 20 137/64 97   


 


6/10/17 20:00      Room Air  


 


6/10/17 16:00 98.4 94 18 133/72 97   


 


6/10/17 12:00 98.2 94 18 130/74 99   








 I/O








 6/10/17 6/10/17 6/10/17 6/11/17 6/11/17 6/11/17





 07:00 15:00 23:00 07:00 15:00 23:00


 


Intake Total 802 ml 480 ml 897 ml 773 ml  


 


Output Total 1000 ml  600 ml 500 ml  


 


Balance -198 ml 480 ml 297 ml 273 ml  


 


      


 


Intake Oral  480 ml 240 ml 0 ml  


 


IV Total 802 ml  657 ml 773 ml  


 


Output Urine Total 1000 ml  600 ml 500 ml  


 


# Voids  4 0   


 


# Bowel Movements   0 0  





 (Kel Donovan MD R2)


Result Diagram:  


6/11/17 0420                                                                   

             6/11/17 0420





Imaging





Last 72 hours Impressions








Chest X-Ray 6/10/17 0000 Signed





Impressions: 





 Service Date/Time:  Saturday, Gege 10, 2017 10:38 - CONCLUSION:  Mild 

prominence 





 to the cardiac silhouette otherwise negative.     Zain Murray MD  FACR








Objective Remarks


GEN:  Appears much more comfortable, sitting up in chair


HEENT: Atraumatic, normocephalic with EOMI. MMM. No JVD or LAD appreciated


LUNGS: Clear to auscultation bilaterally with no CRW. No increased work of 

breathing


CARDIOVASCULAR:  RR without murmur or gallop.  No significant edema.  


GI/ABD: Soft, nondistended with positive bowel sounds. No masses appreciated.


NEURO:  No focal deficits.  


SKIN: Warm and dry without rash.


HEME/LYMPH:  Scleral icterus mild, pale palms, pale conjunctiva 


MUSC:  Back is normal in appearance.  Extremities are normal in appearance.


PSYCH/MENTAL STATUS:  Alert and oriented x 3. (Kel Donovan MD R2)





A/P


Assessment and Plan


21 year old male admitted with sickle cell pain crisis.


Discharge Planning


Possibly today, will send home with Percocets, hydroxyurea, and folic acid. 

Follow up with hematology in the next few days.  (Kel Donovan MD R2)


Attending Attestation


Patient seen and examined.  Case reviewed and discussed with the resident team.

  Agree with plan of care as discussed with me and documented in the resident 

note. (Yazmin South MD)


Problem List:  


(1) Sickle cell pain crisis


Status:  Acute


Plan:  Sickle cell pain crisis located in bilateral thighs, chest, and lower 

back. Chest x-ray negative x2. No fevers or chills. Bilirubin elevated, mild 

scleral icterus present. Hemoglobin stable, retic count is appropriately, not 

in aplastic crisis. Received one unit PRBC and reports that made him feel 

better.





- Consulted hematology, known to Dr. Glover 


- Pain management: Wean Dilaudid to PRN dosing. Will send home with Percocet 10/

325. 


- IVF - not dehydrated on exam, will give D5 1/2 NS at 100 mls/hr. Discontinue 

before discharge. 


- Continue folic acid, multivitamin


- Continue Hydroxyurea.  


- Daily aspirin 81 mg. 





(2) DVT prophylaxis


Status:  Acute


Plan:  - Heparin 5000 units tid (will be discontinued upon possible discharge)





(3) Nutrition, metabolism, and development symptoms


Status:  Acute


Plan:  - Regular diet 


- IVF: D5 1/2 NS at 100 mls/hr, discontinue at discharge, encourage good 

hydration


 (Kel Donovan MD R2)








Kel Donovan MD R2 Jun 11, 2017 08:45


Yazmin South MD Jun 11, 2017 12:42

## 2017-06-11 NOTE — HHI.DS
Discharge Summary


Admission Date


Jun 7, 2017 at 11:55


Discharge Date:  Jun 11, 2017


Admitting Diagnosis


Sickle Cell Crisis





(1) Sickle cell pain crisis


Diagnosis:  Principal


Plan:  Sickle cell pain crisis located in bilateral thighs, chest, and lower 

back. Chest x-ray negative x2. No fevers or chills. Bilirubin elevated, mild 

scleral icterus present. Hemoglobin stable, retic count is appropriately, not 

in aplastic crisis. Received one unit PRBC and reports that made him feel 

better.





- Consulted hematology, known to Dr. Glover 


- Pain management: Wean Dilaudid to PRN dosing. Will send home with Percocet 10/

325. 


- IVF - not dehydrated on exam, will give D5 1/2 NS at 100 mls/hr. Discontinue 

before discharge. 


- Continue folic acid, multivitamin


- Continue Hydroxyurea.  


- Daily aspirin 81 mg. 





(2) DVT prophylaxis


Diagnosis:  Secondary


Plan:  - Heparin 5000 units tid (will be discontinued upon possible discharge)





(3) Nutrition, metabolism, and development symptoms


Diagnosis:  Secondary


Plan:  - Regular diet 


- IVF: D5 1/2 NS at 100 mls/hr, discontinue at discharge, encourage good 

hydration





Brief History


21 year old male with history of sickle cell disease and G6PD deficiency 

presents with sickle cell crisis. He is experiencing pain in his chest, back, 

and thighs bilaterally. This is typical for pain crises that he's had in the 

past. Onset was 2 days ago with worsening today. He's been hospitalized, per 

his report, about 6 times in the past year for pain crises. His hematologist is 

Dr. Glover. He states he's been keeping up with his appointments. He felt 

shortness of breath with the pain. He has no coughing or sputum production. No 

abdominal pain, nausea, vomiting, diarrhea, or constipation. He rates the pain 

10/10 currently. He had acute chest syndrome once when he was about 16 years 

old. No fevers or chills. He has no headaches or seizures.


CBC/BMP:  


6/11/17 0420                                                                   

             6/11/17 0420





Significant Findings





Laboratory Tests








Test 6/8/17 6/9/17 6/10/17 6/11/17





 21:28 06:45 06:34 04:20


 


Hemoglobin 7.3 GM/DL 7.3 GM/DL 6.8 GM/DL 7.0 GM/DL





 (13.0-17.0) (13.0-17.0) (13.0-17.0) (13.0-17.0)


 


Hematocrit 21.1 % 21.0 % 20.6 % 21.2 %





 (39.0-51.0) (39.0-51.0) (39.0-51.0) (39.0-51.0)


 


White Blood Count  17.3 TH/MM3 14.5 TH/MM3 12.5 TH/MM3





  (4.0-11.0) (4.0-11.0) (4.0-11.0)


 


Red Blood Count  2.76 MIL/MM3 2.67 MIL/MM3 2.75 MIL/MM3





  (4.50-5.90) (4.50-5.90) (4.50-5.90)


 


Mean Corpuscular Volume  75.9 FL 77.1 FL 77.0 FL





  (80.0-100.0) (80.0-100.0) (80.0-100.0)


 


Mean Corpuscular Hemoglobin  26.4 PG 25.6 PG 25.6 PG





  (27.0-34.0) (27.0-34.0) (27.0-34.0)


 


Red Cell Distribution Width  21.4 % 20.8 % 19.1 %





  (11.6-17.2) (11.6-17.2) (11.6-17.2)


 


Neutrophils (%) (Auto)  84.1 %  





  (16.0-70.0)  


 


Lymphocytes (%) (Auto)  6.8 %  





  (9.0-44.0)  


 


Monocytes (%) (Auto)  8.6 % (0.0-8.0)  


 


Neutrophils # (Auto)  14.5 TH/MM3  





  (1.8-7.7)  


 


Monocytes # (Auto)  1.5 TH/MM3  





  (0-0.9)  


 


Neutrophils % (Manual)  84 % (16-70)  


 


Neutrophils # (Manual)  14.7 TH/MM3  





  (1.8-7.7)  


 


Nucleated Red Blood Cells  44 /100 WBC  





  (0-0)  


 


Polychromasia  5.1 % (0.0-1.9)  


 


Sickle Cells  1+  (NORMAL)  


 


Target Cells  2+  (NORMAL)  


 


Reticulocyte Count  12.5 % 9.7 % (0.4-3.0) 8.2 % (0.4-3.0)





  (0.4-3.0)  


 


Absolute Reticulocyte Count  355.1 MIL/L 258.3 MIL/L 224.3 MIL/L





  (20.0-150.0) (20.0-150.0) (20.0-150.0)


 


Blood Urea Nitrogen   3 MG/DL (7-18) 6 MG/DL (7-18)


 


Creatinine   0.33 MG/DL 0.43 MG/DL





   (0.60-1.30) (0.60-1.30)


 


Calcium Level   8.4 MG/DL 





   (8.5-10.1) 


 


Total Bilirubin   4.9 MG/DL 





   (0.2-1.0) 


 


Aspartate Amino Transf   51 U/L (15-37) 





(AST/SGOT)    


 


Potassium Level    3.3 MEQ/L





    (3.5-5.1)








PE at Discharge


GEN:  Appears much more comfortable, sitting up in chair


HEENT: Atraumatic, normocephalic with EOMI. MMM. No JVD or LAD appreciated


LUNGS: Clear to auscultation bilaterally with no CRW. No increased work of 

breathing


CARDIOVASCULAR:  RR without murmur or gallop.  No significant edema.  


GI/ABD: Soft, nondistended with positive bowel sounds. No masses appreciated.


NEURO:  No focal deficits.  


SKIN: Warm and dry without rash.


HEME/LYMPH:  Scleral icterus mild, pale palms, pale conjunctiva 


MUSC:  Back is normal in appearance.  Extremities are normal in appearance.


PSYCH/MENTAL STATUS:  Alert and oriented x 3.


Hospital Course


Patient was admitted for pain control secondary to sickle cell pain crisis. He 

was started on IV fluids and IV Dilaudid medication for control. He was 

transfused 1 unit of PRBCs on hospital day one without complication. On 

hospital day 3 he had one episode of hemoptysis with a fever to 100.3. Chest x-

ray was obtained for possible acute chest syndrome, however was negative. On 

hospital day 4 patient was discharged home with prescription for oxycodone for 

pain control and folic acid. He was otherwise instructed to continue all home 

medications. He is to follow-up with his PCP and hematology within one week.


Pt Condition on Discharge:  Stable


Discharge Disposition:  Discharge Home


Discharge Instructions


DIET: Follow Instructions for:  As Tolerated, No Restrictions


Activities you can perform:  Regular-No Restrictions


Follow up Referrals:  


Hematology - 06/17/17 with Deanna Glover MD


PCP Follow-up - 1 Week





New Medications:  


Hydrocodone-Acetaminophen (Hydrocodone-Acetaminophen)  mg Tab


1 TAB PO Q6H PRN PAIN #20 Ref 0 TAB


Folic Acid (Folic Acid) 1 Mg Tablet


1 MG PO DAILY #30 TAB


Hydroxyurea (Hydrea) 500 Mg Cap


500 MG PO DAILY #30 CAP


 


Continued Medications:  


Folic Acid (Folic Acid) 400 Mcg Tab


400 MCG PO DAILY Nutritional Supplement Ref 0 TAB


Folic Acid (Folic Acid) 400 Mcg Tab


400 MCG PO DAILY Nutritional Supplement Ref 0 TAB


 


Discontinued Medications:  


Hydroxyurea (Sickle Cell Anemia) (Droxia) 400 Mg Cap


500 CAN PO DAILY











Jose Hernandez MD R1 Jun 11, 2017 17:37

## 2017-06-11 NOTE — PD.ONC.PN
Subjective


Subjective


Remarks


Afebrile overnight. Patient resting comfortably in room. 


"I'm ready to go home."


Pain improved. 


No cough.





Objective


Data











  Date Time  Temp Pulse Resp B/P Pulse Ox O2 Delivery O2 Flow Rate FiO2


 


6/11/17 08:00 98.4 97 18 136/66 96   


 


6/11/17 08:00      Room Air  


 


6/11/17 04:00 99.4 103 20 152/75 98   


 


6/11/17 00:00 98.9 95 18 131/67 99   


 


6/10/17 20:00 98.5 94 20 137/64 97   


 


6/10/17 20:00      Room Air  


 


6/10/17 16:00 98.4 94 18 133/72 97   


 


6/10/17 12:00 98.2 94 18 130/74 99   














 6/11/17 6/11/17 6/11/17





 07:00 15:00 23:00


 


Intake Total 773 ml  


 


Output Total 500 ml  


 


Balance 273 ml  








Result Diagram:  


6/11/17 0420                                                                   

             6/11/17 0420





Laboratory Results





Laboratory Tests








Test 6/11/17





 04:20


 


White Blood Count 12.5 TH/MM3


 


Red Blood Count 2.75 MIL/MM3


 


Hemoglobin 7.0 GM/DL


 


Hematocrit 21.2 %


 


Mean Corpuscular Volume 77.0 FL


 


Mean Corpuscular Hemoglobin 25.6 PG


 


Mean Corpuscular Hemoglobin 33.2 %





Concent 


 


Red Cell Distribution Width 19.1 %


 


Platelet Count 227 TH/MM3


 


Mean Platelet Volume 8.5 FL


 


Reticulocyte Count 8.2 %


 


Absolute Reticulocyte Count 224.3 MIL/L


 


Sodium Level 139 MEQ/L


 


Potassium Level 3.3 MEQ/L


 


Chloride Level 100 MEQ/L


 


Carbon Dioxide Level 28.7 MEQ/L


 


Anion Gap 10 MEQ/L


 


Blood Urea Nitrogen 6 MG/DL


 


Creatinine 0.43 MG/DL


 


Estimat Glomerular Filtration 297 ML/MIN





Rate 


 


Random Glucose 96 MG/DL


 


Calcium Level 9.0 MG/DL











Administered Medications








 Medications


  (Trade)  Dose


 Ordered  Sig/Maisha


 Route


 PRN Reason  Start Time


 Stop Time Status Last Admin


Dose Admin


 


 Sodium Chloride 2


 ml  2 ml  BID


 IV FLUSH


   6/7/17 21:00


    6/10/17 20:51


 


 


 Dextrose/Sodium


 Chloride


  (D5W-1/2 NS 1000


 ml Inj)  1,000 ml @ 


 100 mls/hr  Q10H


 IV


   6/7/17 13:47


    6/10/17 20:52


 


 


 Ondansetron HCl


  (Zofran Inj)  4 mg  Q6H  PRN


 IV


 NAUSEA  6/7/17 14:00


    6/8/17 20:23


 


 


 Folic Acid


  (Folate)  1 mg  DAILY


 PO


   6/8/17 09:00


    6/11/17 08:36


 


 


 Multivitamins


  (Theragran)  1 tab  DAILY


 PO


   6/8/17 09:00


    6/11/17 08:36


 


 


 Diphenhydramine


 HCl


  (Benadryl)  25 mg  Q4H  PRN


 PO


 ITCHING  6/7/17 14:00


    6/8/17 20:24


 


 


 Hydroxyurea


  (Hydrea)  500 mg  DAILY


 PO


   6/8/17 09:00


    6/11/17 08:43


 


 


 Senna/Docusate


 Sodium


  (Blanca-Colace)  1 tab  BID


 PO


   6/8/17 09:00


    6/11/17 08:35


 


 


 Aspirin


  (Aspirin Chew)  81 mg  DAILY


 CHEW


   6/7/17 21:15


    6/11/17 08:35


 


 


 Hydromorphone HCl


  (Dilaudid Pf Inj)  4 mg  Q3H  PRN


 IV PUSH


 pain6-10  6/11/17 08:30


    6/11/17 08:37


 








Objective Remarks


GENERAL: Young man, standing in room in nad. 


SKIN: Warm and dry.


HEAD: Normocephalic.


EYES:No injection or drainage. 


NECK: Supple, trachea midline. 


CARDIOVASCULAR: Regular rate and rhythm


RESPIRATORY: Breath sounds equal bilaterally. No accessory muscle use.


GASTROINTESTINAL: Abdomen soft, non-tender, nondistended. 


EXTREMITIES: No cyanosis


NEUROLOGICAL: No obvious focal deficit. Awake, alert, and oriented x3.





Assessment/Plan


Problem List:  


(1) Sickle cell pain crisis


Status:  Acute


Plan:  6/11: symptoms improved. hgb stable. discussed following up in clinic. 

encouraged patient to take exjade at home. clear for discharge


6/10: wants to go home, bilirubin remains elevated. hgb fell slightly. still 

hemolyzing.  still has pain but feels he can better manage it at home. 


6/9: hgb improved.  continue pain regimen.  monitor CBC


6/8: Pt hgb 5.9 today. Will transfuse 1 unit PRBC's. Pain improved. Continue 

current pain regimen, IVF. 





Assessment


22 y/o male with history of sickle cell disease presents to the ER with a 2 day 

history of worsening back pain.


Attending Statement


Pain has significantly improve


Ready to go home


Discharge home


Recommend compliance with the medication


Follow as an outpatient


The exam, history, and the medical decision-making described in the above note 

were completed with the assistance of the mid-level provider. I reviewed and 

agree with the findings presented.  I attest that I had a face-to-face 

encounter with the patient on the same day, and personally performed and 

documented my assessment and findings in the medical record.








Bobbi Yeboah Jun 11, 2017 10:23


Deanna Glover MD Jun 12, 2017 00:52

## 2017-07-23 ENCOUNTER — HOSPITAL ENCOUNTER (EMERGENCY)
Dept: HOSPITAL 17 - NEPE | Age: 22
Discharge: HOME | End: 2017-07-23
Payer: COMMERCIAL

## 2017-07-23 VITALS
TEMPERATURE: 98.3 F | SYSTOLIC BLOOD PRESSURE: 135 MMHG | RESPIRATION RATE: 15 BRPM | HEART RATE: 75 BPM | OXYGEN SATURATION: 98 % | DIASTOLIC BLOOD PRESSURE: 59 MMHG

## 2017-07-23 VITALS
OXYGEN SATURATION: 98 % | HEART RATE: 72 BPM | SYSTOLIC BLOOD PRESSURE: 128 MMHG | DIASTOLIC BLOOD PRESSURE: 52 MMHG | RESPIRATION RATE: 15 BRPM

## 2017-07-23 VITALS — HEIGHT: 66 IN | BODY MASS INDEX: 21.97 KG/M2 | WEIGHT: 136.69 LBS

## 2017-07-23 DIAGNOSIS — D57.00: Primary | ICD-10-CM

## 2017-07-23 DIAGNOSIS — Z79.899: ICD-10-CM

## 2017-07-23 DIAGNOSIS — M54.9: ICD-10-CM

## 2017-07-23 DIAGNOSIS — M79.604: ICD-10-CM

## 2017-07-23 DIAGNOSIS — R07.9: ICD-10-CM

## 2017-07-23 DIAGNOSIS — M79.605: ICD-10-CM

## 2017-07-23 DIAGNOSIS — R94.31: ICD-10-CM

## 2017-07-23 DIAGNOSIS — Z88.2: ICD-10-CM

## 2017-07-23 LAB
ANION GAP SERPL CALC-SCNC: 11 MEQ/L (ref 5–15)
BASOPHILS # BLD AUTO: 0 TH/MM3 (ref 0–0.2)
BASOPHILS NFR BLD: 0.3 % (ref 0–2)
BUN SERPL-MCNC: 8 MG/DL (ref 7–18)
CHLORIDE SERPL-SCNC: 108 MEQ/L (ref 98–107)
EOSINOPHIL # BLD: 0.6 TH/MM3 (ref 0–0.4)
EOSINOPHIL NFR BLD: 5.5 % (ref 0–4)
ERYTHROCYTE [DISTWIDTH] IN BLOOD BY AUTOMATED COUNT: 16.8 % (ref 11.6–17.2)
GFR SERPLBLD BASED ON 1.73 SQ M-ARVRAT: 185 ML/MIN (ref 89–?)
HCO3 BLD-SCNC: 22.5 MEQ/L (ref 21–32)
HCT VFR BLD CALC: 24.5 % (ref 39–51)
HEMO FLAGS: (no result)
HGB S BLD QL SOLY: (no result)
HOWELL-JOLLY BOD BLD QL SMEAR: PRESENT
LYMPHOCYTES # BLD AUTO: 3.4 TH/MM3 (ref 1–4.8)
LYMPHOCYTES NFR BLD AUTO: 31.1 % (ref 9–44)
MCH RBC QN AUTO: 24.7 PG (ref 27–34)
MCHC RBC AUTO-ENTMCNC: 32.3 % (ref 32–36)
MCV RBC AUTO: 76.7 FL (ref 80–100)
MONOCYTES NFR BLD: 4.9 % (ref 0–8)
NEUTROPHILS # BLD AUTO: 6.3 TH/MM3 (ref 1.8–7.7)
NEUTROPHILS NFR BLD AUTO: 58.2 % (ref 16–70)
PLAT MORPH BLD: NORMAL
PLATELET # BLD: 253 TH/MM3 (ref 150–450)
PLATELET BLD QL SMEAR: NORMAL
POTASSIUM SERPL-SCNC: 3.7 MEQ/L (ref 3.5–5.1)
RBC # BLD AUTO: 3.2 MIL/MM3 (ref 4.5–5.9)
RETICS/RBC NFR: 6.8 % (ref 0.4–3)
REVIEW FLAG: (no result)
SCAN/DIFF: (no result)
SODIUM SERPL-SCNC: 141 MEQ/L (ref 136–145)
TARGETS BLD QL SMEAR: (no result)
WBC # BLD AUTO: 10.8 TH/MM3 (ref 4–11)

## 2017-07-23 PROCEDURE — 85025 COMPLETE CBC W/AUTO DIFF WBC: CPT

## 2017-07-23 PROCEDURE — 85044 MANUAL RETICULOCYTE COUNT: CPT

## 2017-07-23 PROCEDURE — 93005 ELECTROCARDIOGRAM TRACING: CPT

## 2017-07-23 PROCEDURE — 99285 EMERGENCY DEPT VISIT HI MDM: CPT

## 2017-07-23 PROCEDURE — 96374 THER/PROPH/DIAG INJ IV PUSH: CPT

## 2017-07-23 PROCEDURE — 96376 TX/PRO/DX INJ SAME DRUG ADON: CPT

## 2017-07-23 PROCEDURE — 96375 TX/PRO/DX INJ NEW DRUG ADDON: CPT

## 2017-07-23 PROCEDURE — 80048 BASIC METABOLIC PNL TOTAL CA: CPT

## 2017-07-23 PROCEDURE — 96361 HYDRATE IV INFUSION ADD-ON: CPT

## 2017-07-23 PROCEDURE — 71010: CPT

## 2017-07-23 NOTE — RADRPT
EXAM DATE/TIME:  07/23/2017 11:45 

 

[facility] COMPARISON:     

CHEST PA & LAT, Gege 10, 2017, 10:38.

 

                     

INDICATIONS :     

Chest pain

                     

 

MEDICAL HISTORY :     

Sickle Cell disease.          

 

SURGICAL HISTORY :     

None.   

 

ENCOUNTER:     

Initial                                        

 

ACUITY:     

3 days      

 

PAIN SCORE:     

10/10

LOCATION:      chest 

 

FINDINGS:     

The lungs are clear without infiltrate, nodule, or mass.  There is no appreciable pleural effusion fo
r technique.  Heart and mediastinum are unremarkable.

 

CONCLUSION:         No acute cardiopulmonary disease.

 

 

 FRANK Terrazas MD on July 23, 2017 at 12:22           

Board Certified Radiologist.

 This report was verified electronically.

## 2017-07-23 NOTE — PD
HPI


Chief Complaint:  Sickle Cell


Time Seen by Provider:  11:43


Travel History


International Travel<30 days:  No


Contact w/Intl Traveler<30days:  No


Traveled to known affect area:  No





History of Present Illness


HPI


This is a 21-year-old male with history of sickle cell anemia who presents for 

evaluation of "sickle cell pain."  He reports over the past 4 days he has been 

having pain in his back, chest and legs.  The pain is an aching pain, constant, 

unrelieved with his at home Percocet.  He believes that it is related to the 

weatherhe reports that when it rains his sickle cell pain is exacerbated.  He 

denies fevers or chills, cough or congestion, shortness of breath, nausea or 

vomiting or diarrhea, abdominal pain, recent illness.  His hematologist/

oncologist is Dr. Glover.  He has no primary care physician.  He has no other 

complaints at this time.





PFSH


Past Medical History


Hx Anticoagulant Therapy:  No


Anemia:  Yes (SICKLE CELL)


Arthritis:  No


Asthma:  No


Autoimmune Disease:  No


Blood Disorders:  Yes ( G6PD DEFICIENCY)


Anxiety:  No


Depression:  No


Heart Rhythm Problems:  No


Cancer:  No


Cardiovascular Problems:  Yes (heart murmur)


High Cholesterol:  No


Chemotherapy:  No


Chest Pain:  Yes


Congestive Heart Failure:  No


COPD:  No


Cerebrovascular Accident:  No


Diabetes:  No


Diminished Hearing:  No


Endocrine:  No


Gastrointestinal Disorders:  Yes (gall stones)


Genetic Disorder:  Yes (SICKLE CELL, G6PD DEFICIENCY)


GERD:  No


Genitourinary:  No


Headaches:  No


Hiatal Hernia:  No


Immune Disorder:  Yes (SICKLE CELL)


Implanted Vascular Access Dvce:  No


Kidney Stones:  No


Musculoskeletal:  No


Neurologic:  No


Psychiatric:  Yes


Reproductive:  No


Respiratory:  No


Immunizations Current:  Yes


Migraines:  No


Pneumonia:  Yes


Radiation Therapy:  No


Renal Failure:  No


Seizures:  No


Sickle Cell Disease:  Yes


Sleep Apnea:  No


Thyroid Disease:  No


Ulcer:  No





Past Surgical History


Abdominal Surgery:  No


AICD:  No


Arteriovenous Shunt:  No


Cardiac Surgery:  No


Ear Surgery:  No


Endocrine Surgery:  No


Eye Surgery:  No


Genitourinary Surgery:  No


Gynecologic Surgery:  No


Hysterectomy:  No


Insulin Pump:  No


Joint Replacement:  No


Oral Surgery:  Yes (dental surgery "AS A SMALL CHILD")


Pacemaker:  No


Thoracic Surgery:  No


Other Surgery:  Yes (PICC LINE /picc line removal)





Social History


Alcohol Use:  No


Tobacco Use:  No


Substance Use:  No





Allergies-Medications


(Allergen,Severity, Reaction):  


Coded Allergies:  


     Sulfa (Verified  Allergy, Intermediate, itching, 7/23/17)


     Morphine (Verified  Adverse Reaction, Mild, itching, 7/23/17)


 states it doesn't work and causes him to itch


Uncoded Allergies:  


     LEONARD BEANS (Allergy, Intermediate, 10/10/14)


Reported Meds & Prescriptions





Reported Meds & Active Scripts


Active


Folic Acid 1 Mg Tablet 1 Mg PO DAILY


Hydrea (Hydroxyurea) 500 Mg Cap 500 Mg PO DAILY


Hydrocodone-Acetaminophen  mg Tab 1 Tab PO Q6H PRN


Reported


Folic Acid 400 Mcg Tab 400 Mcg PO DAILY


Folic Acid 400 Mcg Tab 400 Mcg PO DAILY








Review of Systems


Except as stated in HPI:  all other systems reviewed are Neg





Physical Exam


Narrative


GENERAL: Well-developed well-nourished male who appears uncomfortable on 

initial examination.


SKIN: Warm and dry.  No obvious rashes.


HEAD: Atraumatic. Normocephalic. 


EYES: Pupils equal and round. No scleral icterus. No injection or drainage. 


ENT: No nasal bleeding or discharge.  Mucous membranes pink and moist.


NECK: Trachea midline. No JVD. 


CARDIOVASCULAR: Regular rate and rhythm.  No murmur appreciated.


RESPIRATORY: No accessory muscle use. Clear to auscultation. Breath sounds 

equal bilaterally.   No crackles no wheezing or rhonchi


GASTROINTESTINAL: Abdomen soft, non-tender, nondistended. Hepatic and splenic 

margins not palpable. 


MUSCULOSKELETAL: No obvious deformities. No clubbing.  No cyanosis.  No edema.


NEUROLOGICAL: Awake and alert. No obvious cranial nerve deficits.  Motor 

grossly within normal limits. Normal speech.


PSYCHIATRIC: Appropriate mood and affect; insight and judgment normal.





Data


Data


Last Documented VS





Vital Signs








  Date Time  Temp Pulse Resp B/P Pulse Ox O2 Delivery O2 Flow Rate FiO2


 


7/23/17 12:45   15     


 


7/23/17 11:23 98.3 75  135/59 98   








Orders





 Basic Metabolic Panel (Bmp) (7/23/17 11:48)


Complete Blood Count With Diff (7/23/17 11:48)


Retic Count (7/23/17 11:48)


Chest, Single Ap (7/23/17 11:48)


Ondansetron Inj (Zofran Inj) (7/23/17 12:00)


Sodium Chloride 0.9% Flush (Ns Flush) (7/23/17 12:00)


Sodium Chlor 0.9% 1000 Ml Inj (Ns 1000 M (7/23/17 11:48)


Hydromorphone Pf Inj (Dilaudid Pf Inj) (7/23/17 12:00)


Electrocardiogram (7/23/17 )


Hydromorphone Pf Inj (Dilaudid Pf Inj) (7/23/17 13:00)





Labs








 Laboratory Tests








Test 7/23/17





 12:05


 


White Blood Count 10.8 TH/MM3


 


Red Blood Count 3.20 MIL/MM3


 


Hemoglobin 7.9 GM/DL


 


Hematocrit 24.5 %


 


Mean Corpuscular Volume 76.7 FL


 


Mean Corpuscular Hemoglobin 24.7 PG


 


Mean Corpuscular Hemoglobin 32.3 %





Concent 


 


Red Cell Distribution Width 16.8 %


 


Platelet Count 253 TH/MM3


 


Mean Platelet Volume 7.6 FL


 


Neutrophils (%) (Auto) 58.2 %


 


Lymphocytes (%) (Auto) 31.1 %


 


Monocytes (%) (Auto) 4.9 %


 


Eosinophils (%) (Auto) 5.5 %


 


Basophils (%) (Auto) 0.3 %


 


Neutrophils # (Auto) 6.3 TH/MM3


 


Lymphocytes # (Auto) 3.4 TH/MM3


 


Monocytes # (Auto) 0.5 TH/MM3


 


Eosinophils # (Auto) 0.6 TH/MM3


 


Basophils # (Auto) 0.0 TH/MM3


 


CBC Comment AUTO DIFF 


 


Differential Comment AUTO DIFF





 CONFIRMED


 


Platelet Estimate NORMAL 


 


Platelet Morphology Comment NORMAL 


 


Sickle Cells 1+ 


 


Target Cells 1+ 


 


[place] Bodies PRESENT 


 


Reticulocyte Count 6.8 %


 


Absolute Reticulocyte Count 218.6 MIL/L


 


Sodium Level 141 MEQ/L


 


Potassium Level 3.7 MEQ/L


 


Chloride Level 108 MEQ/L


 


Carbon Dioxide Level 22.5 MEQ/L


 


Anion Gap 11 MEQ/L


 


Blood Urea Nitrogen 8 MG/DL


 


Creatinine 0.65 MG/DL


 


Estimat Glomerular Filtration 185 ML/MIN





Rate 


 


Random Glucose 85 MG/DL


 


Calcium Level 9.3 MG/DL














MDM


Medical Decision Making


Medical Screen Exam Complete:  Yes


Emergency Medical Condition:  Yes


Medical Record Reviewed:  Yes


Interpretation(s)


EKG sinus rhythm, LVH, unchanged from previous EKG.


Chest x-ray reveals no acute abnormalities


CBC hemoglobin 7.9 improved from previous.  Reticulocyte 218


Differential Diagnosis


Vaso-occlusive crisis, acute chest syndrome, pneumonia, dehydration, opiate 

dependence


Narrative Course


21-year-old male with history of sickle cell anemia presents with 4 days of 

worsening pain in the back, chest and legs consistent with his previous sickle 

cell crises.  He has been attempting to control his pain at home with his 

prescribed Percocet with minimal relief in his symptoms which prompted 

evaluation today.  Plan is for basic lab work, EKG and chest x-ray.  He will be 

given IV fluids, Dilaudid and Zofran.


Upon reexamination the patient has persistent pain that required an additional 

dose of Dilaudid.  His lab work is reassuring.  His hemoglobin is improved from 

the most recent levels on record.  The patient tolerated oral hydration quite 

well in the form of Gatorade and water.  At this point in time the plan would 

be to have the patient follow up with his hematologist Dr. Glover and return 

for intractable symptoms.  He is agreeable.





Procedures


EKG Prior to Arrival:  Yes





Diagnosis





 Primary Impression:  


 Sickle cell pain crisis


Referrals:  


Deanna Glover MD





***Additional Instructions:


Follow-up with your hematologist Dr. Glover.  Stay well hydrated.  Return for 

any emergent medical conditions.


***Med/Other Pt SpecificInfo:  No Change to Meds


Disposition:  01 DISCHARGE HOME


Condition:  Stable








Rj Calle Jul 23, 2017 11:53

## 2017-07-27 ENCOUNTER — HOSPITAL ENCOUNTER (INPATIENT)
Dept: HOSPITAL 17 - NEPC | Age: 22
Discharge: LEFT BEFORE DISCHARGE | DRG: 812 | End: 2017-07-27
Attending: FAMILY MEDICINE | Admitting: FAMILY MEDICINE
Payer: COMMERCIAL

## 2017-07-27 VITALS
HEART RATE: 101 BPM | RESPIRATION RATE: 20 BRPM | TEMPERATURE: 98.2 F | DIASTOLIC BLOOD PRESSURE: 77 MMHG | OXYGEN SATURATION: 100 % | SYSTOLIC BLOOD PRESSURE: 154 MMHG

## 2017-07-27 VITALS — DIASTOLIC BLOOD PRESSURE: 77 MMHG | RESPIRATION RATE: 20 BRPM | HEART RATE: 92 BPM | SYSTOLIC BLOOD PRESSURE: 154 MMHG

## 2017-07-27 VITALS
SYSTOLIC BLOOD PRESSURE: 119 MMHG | DIASTOLIC BLOOD PRESSURE: 95 MMHG | RESPIRATION RATE: 20 BRPM | OXYGEN SATURATION: 100 % | HEART RATE: 105 BPM | TEMPERATURE: 97.8 F

## 2017-07-27 VITALS
OXYGEN SATURATION: 96 % | HEART RATE: 87 BPM | SYSTOLIC BLOOD PRESSURE: 123 MMHG | RESPIRATION RATE: 19 BRPM | TEMPERATURE: 97.9 F | DIASTOLIC BLOOD PRESSURE: 63 MMHG

## 2017-07-27 DIAGNOSIS — D57.00: Primary | ICD-10-CM

## 2017-07-27 LAB
ALP SERPL-CCNC: 74 U/L (ref 45–117)
ALT SERPL-CCNC: 18 U/L (ref 12–78)
ANION GAP SERPL CALC-SCNC: 12 MEQ/L (ref 5–15)
AST SERPL-CCNC: 47 U/L (ref 15–37)
BILIRUB INDIRECT SERPL-MCNC: 2.4 MG/DL (ref 0–0.8)
BILIRUB SERPL-MCNC: 2.8 MG/DL (ref 0.2–1)
BUN SERPL-MCNC: 9 MG/DL (ref 7–18)
CHLORIDE SERPL-SCNC: 106 MEQ/L (ref 98–107)
EOSINOPHIL NFR BLD: 1 % (ref 0–4)
ERYTHROCYTE [DISTWIDTH] IN BLOOD BY AUTOMATED COUNT: 19.1 % (ref 11.6–17.2)
GFR SERPLBLD BASED ON 1.73 SQ M-ARVRAT: 159 ML/MIN (ref 89–?)
HCO3 BLD-SCNC: 22.4 MEQ/L (ref 21–32)
HCT VFR BLD CALC: 23.3 % (ref 39–51)
HEMO FLAGS: (no result)
HGB S BLD QL SOLY: (no result)
HOWELL-JOLLY BOD BLD QL SMEAR: PRESENT
LDH SERPL-CCNC: 639 U/L (ref 87–241)
MCH RBC QN AUTO: 25 PG (ref 27–34)
MCHC RBC AUTO-ENTMCNC: 32.2 % (ref 32–36)
MCV RBC AUTO: 77.6 FL (ref 80–100)
NEUTS BAND # BLD MANUAL: 7.5 TH/MM3 (ref 1.8–7.7)
NEUTS SEG NFR BLD MANUAL: 46 % (ref 16–70)
PLAT MORPH BLD: NORMAL
PLATELET # BLD: 258 TH/MM3 (ref 150–450)
PLATELET BLD QL SMEAR: NORMAL
POLYCHROMASIA BLD QL SMEAR: 2 % (ref 0–1.9)
POTASSIUM SERPL-SCNC: 3.7 MEQ/L (ref 3.5–5.1)
RBC # BLD AUTO: 3.01 MIL/MM3 (ref 4.5–5.9)
RETICS/RBC NFR: 11.4 % (ref 0.4–3)
REVIEW FLAG: (no result)
SCAN/DIFF: (no result)
SODIUM SERPL-SCNC: 140 MEQ/L (ref 136–145)
TARGETS BLD QL SMEAR: (no result)
WBC # BLD AUTO: 16.3 TH/MM3 (ref 4–11)
WBC DIFF SAMPLE: 100
WBC NRBC COR # BLD: 3 /100 WBC (ref 0–0)

## 2017-07-27 PROCEDURE — 85007 BL SMEAR W/DIFF WBC COUNT: CPT

## 2017-07-27 PROCEDURE — 96361 HYDRATE IV INFUSION ADD-ON: CPT

## 2017-07-27 PROCEDURE — 80076 HEPATIC FUNCTION PANEL: CPT

## 2017-07-27 PROCEDURE — 85044 MANUAL RETICULOCYTE COUNT: CPT

## 2017-07-27 PROCEDURE — 96376 TX/PRO/DX INJ SAME DRUG ADON: CPT

## 2017-07-27 PROCEDURE — 96374 THER/PROPH/DIAG INJ IV PUSH: CPT

## 2017-07-27 PROCEDURE — 83615 LACTATE (LD) (LDH) ENZYME: CPT

## 2017-07-27 PROCEDURE — 80048 BASIC METABOLIC PNL TOTAL CA: CPT

## 2017-07-27 PROCEDURE — 85027 COMPLETE CBC AUTOMATED: CPT

## 2017-07-27 PROCEDURE — 96375 TX/PRO/DX INJ NEW DRUG ADDON: CPT

## 2017-07-27 RX ADMIN — PHENYTOIN SODIUM SCH MLS/HR: 50 INJECTION INTRAMUSCULAR; INTRAVENOUS at 09:31

## 2017-07-27 RX ADMIN — PHENYTOIN SODIUM SCH MLS/HR: 50 INJECTION INTRAMUSCULAR; INTRAVENOUS at 10:09

## 2017-07-27 NOTE — HHI.HP
HPI


Service


Family Medicine


Primary Care Physician


Deanna Glover MD


Admission Diagnosis


Sickle Cell Pain


Diagnoses:  


International Travel<30 Days:  No


Contact w/Intl Traveler<30days:  No


Known Affected Area:  No


History of Present Illness


Pt is a 21-year-old  male with past medical history significant 

for sickle cell presenting due to acute pain crisis.  Patient reported he 

started to have pain in his lower back, thighs, hips, knees and feet since last 

week Thursday. He also endorses chest discomfort for the same amount of time. 

Pain is located across his entire chest, no radiation. He took prescribed 

Percocet and this did not help.  He denies fevers, chills, cough, shortness of 

breath. No decreased PO intake. He saw his hematologist, Dr. Glover on 

Monday. He was given IV fluids and pain medication and this did not help.  His 

hemoglobin at baseline is normally 67.





Review of Systems


Constitutional:  DENIES: Fever, Chills


Ears, nose, mouth, throat:  COMPLAINS OF: Ear Pain (left ear fullness), Running 

Nose


Respiratory:  DENIES: Cough


Cardiovascular:  COMPLAINS OF: Chest pain


Gastrointestinal:  DENIES: Abdominal pain


Musculoskeletal:  COMPLAINS OF: Joint pain, Muscle aches, Stiffness, Back pain


Integumentary:  DENIES: Abnormal pigmentation, Rash


Hematologic/lymphatic:  DENIES: Bruising


Neurologic:  DENIES: Abnormal gait


Psychiatric:  DENIES: Mood changes





Past Family Social History


Past Medical History


Sickle cell disease 


History of acute chest syndrome once at age 16 


G6PD 


Follows with Dr. Glover for hematology


Past Surgical History


Dental surgery


Reported Medications





Reported Meds & Active Scripts


Active


Folic Acid 1 Mg Tablet 1 Mg PO DAILY


Hydrea (Hydroxyurea) 500 Mg Cap 500 Mg PO DAILY


Hydrocodone-Acetaminophen  mg Tab 1 Tab PO Q6H PRN


Reported


Folic Acid 400 Mcg Tab 400 Mcg PO DAILY


Allergies:  


Coded Allergies:  


     Sulfa (Verified  Allergy, Intermediate, itching, 7/27/17)


     Morphine (Verified  Adverse Reaction, Mild, itching, 7/27/17)


 states it doesn't work and causes him to itch


Uncoded Allergies:  


     LEONARD BEANS (Allergy, Intermediate, 10/10/14)


Active Ordered Medications





 Inpatient Medications


Acetaminophen (Tylenol) 650 mg Q4H  PRN PO TEMP > 100.4;  Start 7/27/17 at 09:45


Bisacodyl (Dulcolax Supp) 10 mg DAILY  PRN RECTAL SEVERE CONSITIPATION;  Start 7 /27/17 at 09:45


Diphenhydramine HCl (Benadryl Inj) 25 mg ONCE  ONCE IV  Last administered on 7/ 27/17at 06:48;  Start 7/27/17 at 06:45;  Stop 7/27/17 at 06:46;  Status DC


Enoxaparin Sodium (Lovenox Inj) 40 mg Q24H SQ ;  Start 7/27/17 at 10:00


Folic Acid (Folate) 1 mg DAILY PO ;  Start 7/27/17 at 11:00


Hydromorphone HCl (Dilaudid Pf Inj) 2 mg Q4H  PRN IV PAIN SCALE 6 TO 10;  Start 

7/27/17 at 10:00


Hydromorphone HCl (Dilaudid) 1 mg Q3HR  PRN PO BREAKTHROUGH PAIN;  Start 7/27/ 17 at 10:00


Hydromorphone HCl 1 mg 1 mg ONCE  ONCE IV PUSH  Last administered on 7/27/17at 

08:27;  Start 7/27/17 at 08:15;  Stop 7/27/17 at 08:16;  Status DC


Hydroxyurea (Hydrea) 500 mg DAILY PO ;  Start 7/27/17 at 11:00


Ketorolac Tromethamine (Toradol Inj) 30 mg ONCE  ONCE IVP  Last administered on 

7/27/17at 06:46;  Start 7/27/17 at 06:45;  Stop 7/27/17 at 06:46;  Status DC


Lactulose (Lactulose Liq) 30 ml DAILY  PRN PO SEVERE CONSITIPATION;  Start 7/27/ 17 at 09:45


Magnesium Hydroxide (Milk Of Magnesia Liq) 30 ml Q12H  PRN PO MILD - MODERATE 

CONSTIPATION;  Start 7/27/17 at 09:45


Miscellaneous (Pill Splitter) 1 ea UNSCH  PRN OTHER SEE LABEL COMMENTS;  Start 7 /27/17 at 11:15


Multivitamins (Theragran) 1 tab DAILY PO ;  Start 7/28/17 at 09:00


Naloxone HCl (Narcan Inj) 0.4 mg UNSCH  PRN IV SEE LABEL COMMENTS;  Start 7/27/ 17 at 09:45


Ondansetron HCl (Zofran Inj) 4 mg Q6H  PRN IVP NAUSEA OR VOMITING;  Start 7/27/ 17 at 09:45


Senna/Docusate Sodium (Blanca-Colace) 1 tab BID PO ;  Start 7/27/17 at 21:00


Sennosides (Senokot) 17.2 mg Q12H  PRN PO MODERATE - SEVERE CONSTIPATION;  

Start 7/27/17 at 09:45


Sodium Chloride (NS 1000 ml Inj) 1,000 ml @  100 mls/hr Q10H IV  Last 

administered on 7/27/17at 10:09;  Start 7/27/17 at 09:31


Sodium Chloride (NS Flush) 2 ml BID IV FLUSH ;  Start 7/27/17 at 21:00


Family History


Family history significant for sickle cell disease


Social History


Patient denies smoking, drinking, illicit substance use.


Patient graduated from college, declines to provide any additional history





Physical Exam


Vital Signs





Vital Signs








  Date Time  Temp Pulse Resp B/P Pulse Ox O2 Delivery O2 Flow Rate FiO2


 


7/27/17 07:28      Nasal Cannula 2 


 


7/27/17 07:27 97.9 87 19 123/63 96 Room Air  


 


7/27/17 06:25  79 28  100   


 


7/27/17 06:10 97.8 105 20 119/95 100 Room Air  








Physical Exam


GENERAL: This is a well-nourished, well-developed patient, appears to be in a 

significant amount of pain. Pt is moaning and in tears, but is distractible. 


SKIN: No rashes, ecchymoses or lesions. Cool and dry.


HEAD: Atraumatic. Normocephalic. No temporal or scalp tenderness.


EYES: Pupils equal round and reactive. Extraocular motions intact. No scleral 

icterus. No injection or drainage. 


ENT: Nose without bleeding, purulent drainage or septal hematoma. Throat 

without erythema, tonsillar hypertrophy or exudate. Uvula midline. Airway 

patent.


NECK: Trachea midline. No JVD or lymphadenopathy. Supple, nontender, no 

meningeal signs.


CARDIOVASCULAR: Regular rate and rhythm/ 1/2 DENISE. 


RESPIRATORY: Clear to auscultation. Breath sounds equal bilaterally. No wheezes

, rales, or rhonchi.  


GASTROINTESTINAL: Abdomen soft, non-tender, nondistended. No hepato-splenomegaly

, or palpable masses. No guarding.


MUSCULOSKELETAL: Extremities without clubbing, cyanosis, or edema. 


NEUROLOGICAL: Awake and alert. Cranial nerves II through XII intact.  Motor and 

sensory grossly within normal limits. Five out of 5 muscle strength in all 

muscle groups.  Normal speech.


Laboratory





Laboratory Tests








Test 7/27/17





 06:30


 


White Blood Count 16.3 


 


Red Blood Count 3.01 


 


Hemoglobin 7.5 


 


Hematocrit 23.3 


 


Mean Corpuscular Volume 77.6 


 


Mean Corpuscular Hemoglobin 25.0 


 


Mean Corpuscular Hemoglobin 32.2 





Concent 


 


Red Cell Distribution Width 19.1 


 


Platelet Count 258 


 


Mean Platelet Volume 8.0 


 


Neutrophils (%) (Auto)  


 


Lymphocytes (%) (Auto)  


 


Monocytes (%) (Auto)  


 


Eosinophils (%) (Auto)  


 


Basophils (%) (Auto)  


 


Neutrophils # (Auto)  


 


Lymphocytes # (Auto)  


 


Monocytes # (Auto)  


 


Eosinophils # (Auto)  


 


Basophils # (Auto)  


 


CBC Comment AUTO DIFF 


 


Differential Total Cells 100 





Counted 


 


Neutrophils % (Manual) 46 


 


Lymphocytes % 48 


 


Monocytes % 5 


 


Eosinophils % 1 


 


Neutrophils # (Manual) 7.5 


 


Nucleated Red Blood Cells 3 


 


Differential Comment FINAL DIFF





 MANUAL


 


Platelet Estimate NORMAL 


 


Platelet Morphology Comment NORMAL 


 


Polychromasia 2.0 


 


Sickle Cells 1+ 


 


Target Cells 1+ 


 


[place] Bodies PRESENT 


 


Reticulocyte Count 11.4 


 


Absolute Reticulocyte Count 343.1 


 


Sodium Level 140 


 


Potassium Level 3.7 


 


Chloride Level 106 


 


Carbon Dioxide Level 22.4 


 


Anion Gap 12 


 


Blood Urea Nitrogen 9 


 


Creatinine 0.74 


 


Estimat Glomerular Filtration 159 





Rate 


 


Random Glucose 82 


 


Calcium Level 9.3 








Result Diagram:  


7/27/17 0630 7/27/17 0630








Assessment and Plan


Assessment and Plan


Patient is a 21-year-old  male with history of sickle cell 

presenting due to an acute sickle cell pain crisis for pain control.  

Hemoglobin appears to be at baseline.  Hematology has been consulted.


Code Status


Full


Discussed Condition With


sdw Dr. Martin, PGY1


Problem List:  


(1) Sickle cell disease with crisis


Status:  Acute


Plan:  Pt reports acute pain crisis over the past 4 days. Pain is located in 

his lower back, thighs, knees and feet. 





-Consult hematology, patient followed by Dr. Glover


-See fluids below


-Hemoglobin appears to be above his reported baseline of 6-7.


-Continue home medications; Folic acid, multivitamin, hydroxyurea


-Chest x-ray to be ordered pt develops chest pain to rule out acute chest 

syndrome. Pt afebrile, VSS, reassuring. 





Pain management:


Per review of EMR pt has been on a significant amount of IV dilaudid, up to 4mg 

Q2hrs last hospitalization


-Dilaudid 1 mg pain Q4hrs for pain 15


-Dilaudid 2 mg Q4hrs pain>5


-Dilaudid 1 mg Q3hrs prn breakthrough


Adjust as needed





(2) FEN/PPX


Status:  Acute


Plan:  Fluids: NS 100mls/hr


Electrolytes: within normal limits, continue to monitor


Nutrition: Regular diet


DVT PPX: Stephie García MD R2 Jul 27, 2017 09:37

## 2017-07-27 NOTE — PD
HPI


.


Sickle cell crisis


Chief Complaint:  Sickle Cell


Time Seen by Provider:  06:37


Travel History


International Travel<30 days:  No


Contact w/Intl Traveler<30days:  No


Traveled to known affect area:  No





History of Present Illness


HPI


Patient presents with the chief complaint of sickle cell crisis.  Onset has 

been "a few days."  He states that he has been taking Percocet 10 with no 

relief of his pain.  His last dose was about 10 or 11 PM last night.  Eyes any 

fever or shortness of breath.  He has not been having any chest pain.  His pain 

is in his legs and back.  There are no exacerbating or relieving factors.  He 

states that his pain is severe.





PFSH


Past Medical History


Hx Anticoagulant Therapy:  No


Anemia:  Yes (SICKLE CELL)


Arthritis:  No


Asthma:  No


Autoimmune Disease:  No


Blood Disorders:  Yes ( G6PD DEFICIENCY)


Anxiety:  No


Depression:  No


Heart Rhythm Problems:  No


Cancer:  No


Cardiovascular Problems:  Yes (heart murmur)


High Cholesterol:  No


Chemotherapy:  No


Chest Pain:  Yes


Congestive Heart Failure:  No


COPD:  No


Cerebrovascular Accident:  No


Diabetes:  No


Diminished Hearing:  No


Endocrine:  No


Gastrointestinal Disorders:  Yes (gall stones)


Genetic Disorder:  Yes (SICKLE CELL, G6PD DEFICIENCY)


GERD:  No


Genitourinary:  No


Headaches:  No


Hiatal Hernia:  No


Immune Disorder:  Yes (SICKLE CELL)


Implanted Vascular Access Dvce:  No


Kidney Stones:  No


Musculoskeletal:  No


Neurologic:  No


Psychiatric:  Yes


Reproductive:  No


Respiratory:  No


Immunizations Current:  Yes


Migraines:  No


Pneumonia:  Yes


Radiation Therapy:  No


Renal Failure:  No


Seizures:  No


Sickle Cell Disease:  Yes


Sleep Apnea:  No


Thyroid Disease:  No


Ulcer:  No


Tetanus Vaccination:  Unknown





Past Surgical History


Surgical History:  No Previous Surgery


Abdominal Surgery:  No


AICD:  No


Arteriovenous Shunt:  No


Cardiac Surgery:  No


Ear Surgery:  No


Endocrine Surgery:  No


Eye Surgery:  No


Genitourinary Surgery:  No


Gynecologic Surgery:  No


Hysterectomy:  No


Insulin Pump:  No


Joint Replacement:  No


Oral Surgery:  Yes (dental surgery "AS A SMALL CHILD")


Pacemaker:  No


Thoracic Surgery:  No


Other Surgery:  Yes (PICC LINE /picc line removal)





Social History


Alcohol Use:  No


Tobacco Use:  No


Substance Use:  No





Allergies-Medications


(Allergen,Severity, Reaction):  


Coded Allergies:  


     Sulfa (Verified  Allergy, Intermediate, itching, 7/27/17)


     Morphine (Verified  Adverse Reaction, Mild, itching, 7/27/17)


 states it doesn't work and causes him to itch


Uncoded Allergies:  


     LEONARD BEANS (Allergy, Intermediate, 10/10/14)


Reported Meds & Prescriptions





Reported Meds & Active Scripts


Active


Folic Acid 1 Mg Tablet 1 Mg PO DAILY


Hydrea (Hydroxyurea) 500 Mg Cap 500 Mg PO DAILY


Hydrocodone-Acetaminophen  mg Tab 1 Tab PO Q6H PRN


Reported


Folic Acid 400 Mcg Tab 400 Mcg PO DAILY


Folic Acid 400 Mcg Tab 400 Mcg PO DAILY








Review of Systems


Except as stated in HPI:  all other systems reviewed are Neg


General / Constitutional:  No: Fever, Chills


Cardiovascular:  No: Chest Pain or Discomfort


Respiratory:  No: Shortness of Breath


Musculoskeletal:  Positive: Pain





Physical Exam


Narrative


GENERAL: The patient appears to be in significant stress related to his pain.  

He is writhing around on the bed.


SKIN: Warm and dry.


HEAD: Atraumatic. Normocephalic. 


EYES: Pupils equal and round.  Sclera are anicteric.  Extraocular movements are 

intact.


ENT: No nasal bleeding or discharge.  Mucous membranes pink and moist.


NECK: Trachea midline.  Neck is supple.


CARDIOVASCULAR: Regular rate and rhythm.  Heart sounds are normal.


RESPIRATORY: No accessory muscle use.  Lungs are clear with full air movement 

throughout.


GASTROINTESTINAL: Abdomen soft, non-tender, nondistended. 


MUSCULOSKELETAL: No obvious deformities.   No edema. 


NEUROLOGICAL: Awake and alert. No obvious cranial nerve deficits.  Motor 

grossly within normal limits. Normal speech.


PSYCHIATRIC: Appropriate mood and affect; insight and judgment normal.





Data


Data


Last Documented VS





Vital Signs








  Date Time  Temp Pulse Resp B/P Pulse Ox O2 Delivery O2 Flow Rate FiO2


 


7/27/17 06:25  79 28  100   


 


7/27/17 06:10 97.8   119/95  Room Air  








Orders





 Basic Metabolic Panel (Bmp) (7/27/17 06:37)


Complete Blood Count With Diff (7/27/17 06:37)


Retic Count (7/27/17 06:37)


Iv Access Insert/Monitor (7/27/17 06:37)


Oxygen Administration (7/27/17 06:37)


Ketorolac Inj (Toradol Inj) (7/27/17 06:45)


Sodium Chloride 0.9% Flush (Ns Flush) (7/27/17 06:45)


Sodium Chlor 0.9% 1000 Ml Inj (Ns 1000 M (7/27/17 06:37)


Hydromorphone Pf Inj (Dilaudid Pf Inj) (7/27/17 06:45)


Diphenhydramine Inj (Benadryl Inj) (7/27/17 06:45)








MDM


Medical Decision Making


Medical Screen Exam Complete:  Yes


Emergency Medical Condition:  Yes


Medical Record Reviewed:  Yes (the patient was seen here on 7/23.  His 

hemoglobin was 7.9.  His reticulocyte count was 6.8.  He was treated with IV 

fluid and IV Dilaudid.  He received a total of 2.5 mg of Dilaudid.)


Differential Diagnosis


My differential diagnosis of sickle cell disease includes but is not limited to 

vaso-occlusive crisis, acute chest syndrome, occult infection, electrolyte 

disturbance, drug-seeking behavior.


Narrative Course


This patient presents with chief complaint of sickle cell crisis.  I have 

ordered IV fluids, IV Dilaudid, IV Benadryl and IV Toradol.  We will check his H

&H and reticulocyte count.





His care is being turned over to the oncoming physician at 7 AM.





Diagnosis





 Primary Impression:  


 Sickle cell pain crisis


Condition:  Stable








Shannen Merrill MD Jul 27, 2017 06:48

## 2017-07-27 NOTE — PD
Data


Data


Last Documented VS





Vital Signs








  Date Time  Temp Pulse Resp B/P Pulse Ox O2 Delivery O2 Flow Rate FiO2


 


7/27/17 07:28      Nasal Cannula 2 


 


7/27/17 07:27 97.9 87 19 123/63 96   








Orders





 Basic Metabolic Panel (Bmp) (7/27/17 06:37)


Complete Blood Count With Diff (7/27/17 06:37)


Retic Count (7/27/17 06:37)


Iv Access Insert/Monitor (7/27/17 06:37)


Oxygen Administration (7/27/17 06:37)


Ketorolac Inj (Toradol Inj) (7/27/17 06:45)


Sodium Chloride 0.9% Flush (Ns Flush) (7/27/17 06:45)


Sodium Chlor 0.9% 1000 Ml Inj (Ns 1000 M (7/27/17 06:37)


Hydromorphone Pf Inj (Dilaudid Pf Inj) (7/27/17 06:45)


Diphenhydramine Inj (Benadryl Inj) (7/27/17 06:45)


Hydromorphone Pf Inj (Dilaudid Pf Inj) (7/27/17 08:15)





Labs





 Laboratory Tests








Test 7/27/17





 06:30


 


White Blood Count 16.3 TH/MM3


 


Red Blood Count 3.01 MIL/MM3


 


Hemoglobin 7.5 GM/DL


 


Hematocrit 23.3 %


 


Mean Corpuscular Volume 77.6 FL


 


Mean Corpuscular Hemoglobin 25.0 PG


 


Mean Corpuscular Hemoglobin 32.2 %





Concent 


 


Red Cell Distribution Width 19.1 %


 


Platelet Count 258 TH/MM3


 


Mean Platelet Volume 8.0 FL


 


Neutrophils (%) (Auto)  %


 


Lymphocytes (%) (Auto)  %


 


Monocytes (%) (Auto)  %


 


Eosinophils (%) (Auto)  %


 


Basophils (%) (Auto)  %


 


Neutrophils # (Auto)  TH/MM3


 


Lymphocytes # (Auto)  TH/MM3


 


Monocytes # (Auto)  TH/MM3


 


Eosinophils # (Auto)  TH/MM3


 


Basophils # (Auto)  TH/MM3


 


CBC Comment AUTO DIFF 


 


Differential Total Cells 100 





Counted 


 


Neutrophils % (Manual) 46 %


 


Lymphocytes % 48 %


 


Monocytes % 5 %


 


Eosinophils % 1 %


 


Neutrophils # (Manual) 7.5 TH/MM3


 


Nucleated Red Blood Cells 3 /100 WBC


 


Differential Comment FINAL DIFF





 MANUAL


 


Platelet Estimate NORMAL 


 


Platelet Morphology Comment NORMAL 


 


Polychromasia 2.0 %


 


Sickle Cells 1+ 


 


Target Cells 1+ 


 


[place] Bodies PRESENT 


 


Reticulocyte Count 11.4 %


 


Absolute Reticulocyte Count 343.1 MIL/L


 


Sodium Level 140 MEQ/L


 


Potassium Level 3.7 MEQ/L


 


Chloride Level 106 MEQ/L


 


Carbon Dioxide Level 22.4 MEQ/L


 


Anion Gap 12 MEQ/L


 


Blood Urea Nitrogen 9 MG/DL


 


Creatinine 0.74 MG/DL


 


Estimat Glomerular Filtration 159 ML/MIN





Rate 


 


Random Glucose 82 MG/DL


 


Calcium Level 9.3 MG/DL











MDM


Supervised Visit with EDA:  Yes


Narrative Course


Patient care assumed from Dr. Merrill at 0700, this is a 21-year-old male with 

sickle cell anemia well-known to this department for frequent sickle cell 

anemia crises presents emergency department with typical lower extremity pain 

and back pain.  Denies any chest pain shortness of breath or fevers.  Patient 

received a milligram of Dilaudid Benadryl Toradol and normal saline per Dr. urbano.  Patient approximately 45 minutes later has called nursing and states he 

wanted to be admitted to the hospital because he is not getting adequate his 

pain.  I went to examine him and he is rubbing his left-sided gluteus and 

moving all over the bed.  Reiterating his conversation with nursing I offered 

to call him the hospitalist to have him admitted to the hospital.  At this 

point he starts with bargaining behaviors and states that "I think that if I 

got another dose of pain medicine that might be able to go home and would like 

to try that".  I offered him an half milligram of Dilaudid.  At this point he 

became argumentative and stated only a milligram or more would work for him.  

Milligram of Dilaudid was ordered for the patient, will reassess.





0900 patient was reassessed, is moaning and groaning in pain, ED techs have 

discussed with him to close the door because he is making a fair amount of 

noise.  He jumped up from bed and open the door.  He states he still having 

significant pain.  He was offered admission to the hospital.  Hospitalist has 

been paged.





@0910 patient is seen taking pictures of staff.





Patient now with multiple staff members having to reassure him.  He does not 

want me to take care of him anymore.  Patient was discussed with Dr. Morales 

resident on call will admit to Dr. Stokes.  Further care per resident team.


Diagnosis





 Primary Impression:  


 Sickle cell pain crisis


Condition:  Stable








Roger Vergaar MD Jul 27, 2017 08:11

## 2017-07-27 NOTE — PD.AMA
Against Medical Advice Note


Discharge Disposition:  Against Medical Advice


Pt Condition on Discharge:  Stable


AMA Statement


Patient Elton McgeeJr has decided to leave the hospital against 

medical advice. This patient has the capacity to refuse care and understands 

the risks of leaving, including permanent disability and/or death, and has had 

an opportunity to ask questions about his condition. The patient has been 

informed that he may return for care at any time, and follow up has been 

arranged/advised.  Patient will not be accepted back to the family medicine 

resident service in the future.








Stephie Morales MD R2 Jul 27, 2017 15:34

## 2017-12-06 ENCOUNTER — HOSPITAL ENCOUNTER (EMERGENCY)
Dept: HOSPITAL 17 - NEPC | Age: 22
Discharge: HOME | End: 2017-12-06
Payer: COMMERCIAL

## 2017-12-06 VITALS
OXYGEN SATURATION: 100 % | RESPIRATION RATE: 16 BRPM | DIASTOLIC BLOOD PRESSURE: 69 MMHG | HEART RATE: 86 BPM | SYSTOLIC BLOOD PRESSURE: 182 MMHG | TEMPERATURE: 98.4 F

## 2017-12-06 VITALS
OXYGEN SATURATION: 99 % | SYSTOLIC BLOOD PRESSURE: 140 MMHG | DIASTOLIC BLOOD PRESSURE: 88 MMHG | HEART RATE: 84 BPM | RESPIRATION RATE: 16 BRPM

## 2017-12-06 VITALS — SYSTOLIC BLOOD PRESSURE: 138 MMHG | DIASTOLIC BLOOD PRESSURE: 90 MMHG

## 2017-12-06 VITALS
HEART RATE: 80 BPM | RESPIRATION RATE: 18 BRPM | SYSTOLIC BLOOD PRESSURE: 145 MMHG | OXYGEN SATURATION: 100 % | DIASTOLIC BLOOD PRESSURE: 90 MMHG

## 2017-12-06 VITALS — HEIGHT: 67 IN | BODY MASS INDEX: 21.45 KG/M2 | WEIGHT: 136.69 LBS

## 2017-12-06 DIAGNOSIS — D57.00: Primary | ICD-10-CM

## 2017-12-06 LAB
ANION GAP SERPL CALC-SCNC: 8 MEQ/L (ref 5–15)
BASOPHILS # BLD AUTO: 0.1 TH/MM3 (ref 0–0.2)
BASOPHILS NFR BLD: 0.8 % (ref 0–2)
BUN SERPL-MCNC: 7 MG/DL (ref 7–18)
CHLORIDE SERPL-SCNC: 112 MEQ/L (ref 98–107)
COLOR UR: (no result)
COMMENT (UR): (no result)
CULTURE IF INDICATED: (no result)
EOSINOPHIL # BLD: 0.3 TH/MM3 (ref 0–0.4)
EOSINOPHIL NFR BLD: 2 % (ref 0–4)
EOSINOPHIL NFR BLD: 3.1 % (ref 0–4)
ERYTHROCYTE [DISTWIDTH] IN BLOOD BY AUTOMATED COUNT: 17 % (ref 11.6–17.2)
GFR SERPLBLD BASED ON 1.73 SQ M-ARVRAT: 273 ML/MIN (ref 89–?)
GLUCOSE UR STRIP-MCNC: (no result) MG/DL
HCO3 BLD-SCNC: 23.6 MEQ/L (ref 21–32)
HCT VFR BLD CALC: 21.5 % (ref 39–51)
HEMO FLAGS: (no result)
HGB S BLD QL SOLY: (no result)
HGB UR QL STRIP: (no result)
HOWELL-JOLLY BOD BLD QL SMEAR: PRESENT
KETONES UR STRIP-MCNC: (no result) MG/DL
LYMPHOCYTES # BLD AUTO: 3 TH/MM3 (ref 1–4.8)
LYMPHOCYTES NFR BLD AUTO: 35.2 % (ref 9–44)
MCH RBC QN AUTO: 29.1 PG (ref 27–34)
MCHC RBC AUTO-ENTMCNC: 33.5 % (ref 32–36)
MCV RBC AUTO: 87 FL (ref 80–100)
MONOCYTES NFR BLD: 7.6 % (ref 0–8)
NEUTROPHILS # BLD AUTO: 4.5 TH/MM3 (ref 1.8–7.7)
NEUTROPHILS NFR BLD AUTO: 53.3 % (ref 16–70)
NEUTS BAND # BLD MANUAL: 5.4 TH/MM3 (ref 1.8–7.7)
NEUTS SEG NFR BLD MANUAL: 63 % (ref 16–70)
NITRITE UR QL STRIP: (no result)
PLAT MORPH BLD: NORMAL
PLATELET # BLD: 219 TH/MM3 (ref 150–450)
PLATELET BLD QL SMEAR: NORMAL
POTASSIUM SERPL-SCNC: 3.4 MEQ/L (ref 3.5–5.1)
RBC # BLD AUTO: 2.47 MIL/MM3 (ref 4.5–5.9)
RETICS/RBC NFR: 5.9 % (ref 0.4–3)
REVIEW FLAG: (no result)
SCAN/DIFF: (no result)
SODIUM SERPL-SCNC: 144 MEQ/L (ref 136–145)
SP GR UR STRIP: 1.01 (ref 1–1.03)
TARGETS BLD QL SMEAR: (no result)
WBC # BLD AUTO: 8.5 TH/MM3 (ref 4–11)
WBC DIFF SAMPLE: 100
WBC NRBC COR # BLD: 3 /100 WBC (ref 0–0)

## 2017-12-06 PROCEDURE — 96361 HYDRATE IV INFUSION ADD-ON: CPT

## 2017-12-06 PROCEDURE — 71010: CPT

## 2017-12-06 PROCEDURE — 85007 BL SMEAR W/DIFF WBC COUNT: CPT

## 2017-12-06 PROCEDURE — 99284 EMERGENCY DEPT VISIT MOD MDM: CPT

## 2017-12-06 PROCEDURE — 96374 THER/PROPH/DIAG INJ IV PUSH: CPT

## 2017-12-06 PROCEDURE — 81001 URINALYSIS AUTO W/SCOPE: CPT

## 2017-12-06 PROCEDURE — 96376 TX/PRO/DX INJ SAME DRUG ADON: CPT

## 2017-12-06 PROCEDURE — 80048 BASIC METABOLIC PNL TOTAL CA: CPT

## 2017-12-06 PROCEDURE — 85027 COMPLETE CBC AUTOMATED: CPT

## 2017-12-06 PROCEDURE — 85044 MANUAL RETICULOCYTE COUNT: CPT

## 2017-12-06 NOTE — PD
HPI


Chief Complaint:  Sickle Cell


Time Seen by Provider:  09:36


Travel History


International Travel<30 days:  No


Contact w/Intl Traveler<30days:  No


Traveled to known affect area:  No





History of Present Illness


HPI


22-year-old male presents with chest pain, back pain, pain in his legs which is 

typical of his sickle cell crisis.  He states his last crisis was a couple 

months ago.  He states his home Percocet is not helping.  He states Dr. Mckeon is his hematologist.  He denies any fever or other concurrent 

complaints.  Quality pain is sharp.  Severity is severe per patient.  Pain is 

worse with movement.  He denies other modifying factors.





PFSH


Past Medical History


Hx Anticoagulant Therapy:  No


Anemia:  Yes (SICKLE CELL)


Arthritis:  No


Asthma:  No


Autoimmune Disease:  No


Blood Disorders:  Yes ( G6PD DEFICIENCY)


Anxiety:  No


Depression:  No


Heart Rhythm Problems:  No


Cancer:  No


Cardiovascular Problems:  Yes (heart murmur)


High Cholesterol:  No


Chemotherapy:  No


Chest Pain:  Yes


Congestive Heart Failure:  No


COPD:  No


Cerebrovascular Accident:  No


Diabetes:  No


Diminished Hearing:  No


Endocrine:  No


Gastrointestinal Disorders:  Yes (gall stones)


Genetic Disorder:  Yes (SICKLE CELL, G6PD DEFICIENCY)


GERD:  No


Genitourinary:  No


Headaches:  No


Hiatal Hernia:  No


Immune Disorder:  Yes (SICKLE CELL)


Implanted Vascular Access Dvce:  No


Kidney Stones:  No


Musculoskeletal:  No


Neurologic:  No


Psychiatric:  Yes


Reproductive:  No


Respiratory:  No


Immunizations Current:  Yes


Migraines:  No


Pneumonia:  Yes


Radiation Therapy:  No


Renal Failure:  No


Seizures:  No


Sickle Cell Disease:  Yes


Sleep Apnea:  No


Thyroid Disease:  No


Ulcer:  No





Past Surgical History


Abdominal Surgery:  No


AICD:  No


Arteriovenous Shunt:  No


Cardiac Surgery:  No


Ear Surgery:  No


Endocrine Surgery:  No


Eye Surgery:  No


Genitourinary Surgery:  No


Gynecologic Surgery:  No


Hysterectomy:  No


Insulin Pump:  No


Joint Replacement:  No


Oral Surgery:  Yes (dental surgery "AS A SMALL CHILD")


Pacemaker:  No


Thoracic Surgery:  No


Other Surgery:  Yes (PICC LINE /picc line removal)





Social History


Alcohol Use:  No


Tobacco Use:  No


Substance Use:  No





Allergies-Medications


(Allergen,Severity, Reaction):  


Coded Allergies:  


     Sulfa (Sulfonamide Antibiotics) (Unverified  Allergy, Intermediate, itching

, 8/16/17)


     morphine (Unverified  Adverse Reaction, Mild, itching, 8/16/17)


 states it doesn't work and causes him to itch


Uncoded Allergies:  


     LEONARD BEANS (Allergy, Intermediate, 10/10/14)


Reported Meds & Prescriptions





Reported Meds & Active Scripts


Active


Folic Acid 1 Mg Tablet 1 Mg PO DAILY


Hydrea (Hydroxyurea) 500 Mg Cap 500 Mg PO DAILY


Hydrocodone-Acetaminophen  mg Tab 1 Tab PO Q6H PRN


Reported


Folic Acid 400 Mcg Tab 400 Mcg PO DAILY








Review of Systems


Except as stated in HPI:  all other systems reviewed are Neg





Physical Exam


Narrative


GENERAL: Well-nourished, well-developed patient.


SKIN: Warm and dry.


HEAD: Normocephalic and atraumatic.


EYES: No injection or drainage. 


ENT: No nasal drainage noted. 


NECK: Supple, trachea midline.


CARDIOVASCULAR: Regular rate and rhythm 


RESPIRATORY: Breath sounds equal bilaterally. No accessory muscle use.


GASTROINTESTINAL: Abdomen soft, non-tender, nondistended. 


EXTREMITIES: No edema.


BACK: Nontender without obvious deformity in midline.


NEUROLOGICAL: Awake and alert. Motor and sensory grossly within normal limits. 

Normal speech.





Data


Data


Last Documented VS





Vital Signs








  Date Time  Temp Pulse Resp B/P (MAP) Pulse Ox O2 Delivery O2 Flow Rate FiO2


 


12/6/17 11:14  80 18 145/90 (108) 100 Room Air  


 


12/6/17 08:53 98.4       








Orders





 Orders


Basic Metabolic Panel (Bmp) (12/6/17 09:22)


Complete Blood Count With Diff (12/6/17 09:22)


Retic Count (12/6/17 09:22)


Urinalysis - C+S If Indicated (12/6/17 09:22)


Ecg Monitoring (12/6/17 09:22)


Iv Access Insert/Monitor (12/6/17 09:22)


Oximetry (12/6/17 09:22)


Sodium Chloride 0.9% Flush (Ns Flush) (12/6/17 09:30)


Sodium Chlor 0.9% 1000 Ml Inj (Ns 1000 M (12/6/17 09:22)


Hydromorphone Pf Inj (Dilaudid Pf Inj) (12/6/17 09:45)


Chest, Single Ap (12/6/17 )


Hydromorphone Pf Inj (Dilaudid Pf Inj) (12/6/17 10:45)


Sodium Chlor 0.9% 1000 Ml Inj (Ns 1000 M (12/6/17 11:15)


Hydromorphone Pf Inj (Dilaudid Pf Inj) (12/6/17 11:15)


Hydromorphone Pf Inj (Dilaudid Pf Inj) (12/6/17 11:30)


Ed Discharge Order (12/6/17 12:25)





Labs





Laboratory Tests








Test


  12/6/17


09:40 12/6/17


10:20


 


White Blood Count 8.5 TH/MM3  


 


Red Blood Count 2.47 MIL/MM3  


 


Hemoglobin 7.2 GM/DL  


 


Hematocrit 21.5 %  


 


Mean Corpuscular Volume 87.0 FL  


 


Mean Corpuscular Hemoglobin 29.1 PG  


 


Mean Corpuscular Hemoglobin


Concent 33.5 % 


  


 


 


Red Cell Distribution Width 17.0 %  


 


Platelet Count 219 TH/MM3  


 


Mean Platelet Volume 7.8 FL  


 


Neutrophils (%) (Auto) 53.3 %  


 


Lymphocytes (%) (Auto) 35.2 %  


 


Monocytes (%) (Auto) 7.6 %  


 


Eosinophils (%) (Auto) 3.1 %  


 


Basophils (%) (Auto) 0.8 %  


 


Neutrophils # (Auto) 4.5 TH/MM3  


 


Lymphocytes # (Auto) 3.0 TH/MM3  


 


Monocytes # (Auto) 0.6 TH/MM3  


 


Eosinophils # (Auto) 0.3 TH/MM3  


 


Basophils # (Auto) 0.1 TH/MM3  


 


CBC Comment AUTO DIFF  


 


Differential Total Cells


Counted 100 


  


 


 


Neutrophils % (Manual) 63 %  


 


Lymphocytes % 29 %  


 


Monocytes % 6 %  


 


Eosinophils % 2 %  


 


Neutrophils # (Manual) 5.4 TH/MM3  


 


Nucleated Red Blood Cells 3 /100 WBC  


 


Differential Comment


  FINAL DIFF


MANUAL 


 


 


Platelet Estimate NORMAL  


 


Platelet Morphology Comment NORMAL  


 


Sickle Cells 1+  


 


Target Cells 2+  


 


B[place] Bodies PRESENT  


 


Reticulocyte Count 5.9 %  


 


Absolute Reticulocyte Count 145.2 MIL/L  


 


Blood Urea Nitrogen 7 MG/DL  


 


Creatinine 0.46 MG/DL  


 


Random Glucose 83 MG/DL  


 


Calcium Level 8.9 MG/DL  


 


Sodium Level 144 MEQ/L  


 


Potassium Level 3.4 MEQ/L  


 


Chloride Level 112 MEQ/L  


 


Carbon Dioxide Level 23.6 MEQ/L  


 


Anion Gap 8 MEQ/L  


 


Estimat Glomerular Filtration


Rate 273 ML/MIN 


  


 


 


Urine Color  LIGHT-YELLOW 


 


Urine Turbidity  CLEAR 


 


Urine pH  8.0 


 


Urine Specific Gravity  1.008 


 


Urine Protein  NEG mg/dL 


 


Urine Glucose (UA)  NEG mg/dL 


 


Urine Ketones  NEG mg/dL 


 


Urine Occult Blood  NEG 


 


Urine Nitrite  NEG 


 


Urine Bilirubin  NEG 


 


Urine Urobilinogen


  


  LESS THAN 2.0


MG/DL


 


Urine Leukocyte Esterase  NEG 


 


Urine RBC


  


  LESS THAN 1


/hpf


 


Urine WBC


  


  LESS THAN 1


/hpf


 


Microscopic Urinalysis Comment


  


  CULT NOT


INDICATED











MDM


Medical Decision Making


Medical Screen Exam Complete:  Yes


Emergency Medical Condition:  Yes


Medical Record Reviewed:  Yes (past history confirmed)


Interpretation(s)


CBC & BMP Diagram


12/6/17 09:40








Calcium Level 8.9








Last 24 hours Impressions








Chest X-Ray 12/6/17 0000 Signed





Impressions: 





 Service Date/Time:  Wednesday, December 6, 2017 09:45 - CONCLUSION:  1. No 

acute 





 cardiopulmonary disease.     Rajan Garcia MD 








Differential Diagnosis


Vaso-occlusive crisis, anemia, acute chest syndrome


Narrative Course


Will check blood work, chest x-ray and dose with Dilaudid and IV fluids and 

reevaluate





Patient with persistent pain and given 3 doses now of Dilaudid, will discuss 

with his hematologist





patient given 2mg of additional Dilaudid, on recheck patient was resting when 

he woke up he said he was still having pain, offered observation but patient is 

wanting to go home, advised to follow with his hematologist and given return 

instructions





Physician Communication


Physician Communication


dr mckeon states to give more dilaudid, usually gets 4mg in the office and 

usually not wanting to stay in the hospital





Diagnosis





 Primary Impression:  


 Sickle cell pain crisis


Referrals:  


Deanna Glover MD


call for appointment


Patient Instructions:  General Instructions





***Additional Instructions:  


keep hydrated, take your home pain medication as directed, return with any 

emergent need


***Med/Other Pt SpecificInfo:  No Change to Meds


Disposition:  01 DISCHARGE HOME


Condition:  Stable











Jyothi Joel MD Dec 6, 2017 09:56

## 2017-12-06 NOTE — RADRPT
EXAM DATE/TIME:  12/06/2017 09:45 

 

[facility] COMPARISON:     

CHEST SINGLE AP, July 23, 2017, 11:45.

 

                     

INDICATIONS :     

Chest pain due to sickle cell.

                     

 

MEDICAL HISTORY :            

Sickle Cell disease.   

 

SURGICAL HISTORY :     

None.   

 

ENCOUNTER:     

Initial                                        

 

ACUITY:     

1 day      

 

PAIN SCORE:     

7/10

 

LOCATION:     

Bilateral chest 

 

FINDINGS:     

A single view of the chest demonstrates the lungs to be symmetrically aerated without evidence of mas
s, infiltrate or effusion.  The cardiomediastinal contours are unremarkable.  Osseous structures are 
intact.

 

CONCLUSION:     

1. No acute cardiopulmonary disease.

 

 

 

 Rajan Garcia MD on December 06, 2017 at 10:45           

Board Certified Radiologist.

 This report was verified electronically.

## 2017-12-28 ENCOUNTER — HOSPITAL ENCOUNTER (EMERGENCY)
Dept: HOSPITAL 17 - NEPC | Age: 22
Discharge: HOME | End: 2017-12-28
Payer: COMMERCIAL

## 2017-12-28 VITALS
DIASTOLIC BLOOD PRESSURE: 61 MMHG | HEART RATE: 72 BPM | SYSTOLIC BLOOD PRESSURE: 118 MMHG | RESPIRATION RATE: 18 BRPM | OXYGEN SATURATION: 98 %

## 2017-12-28 VITALS — SYSTOLIC BLOOD PRESSURE: 116 MMHG | DIASTOLIC BLOOD PRESSURE: 67 MMHG

## 2017-12-28 VITALS
OXYGEN SATURATION: 99 % | TEMPERATURE: 97.7 F | SYSTOLIC BLOOD PRESSURE: 110 MMHG | DIASTOLIC BLOOD PRESSURE: 63 MMHG | HEART RATE: 80 BPM | RESPIRATION RATE: 22 BRPM

## 2017-12-28 VITALS — OXYGEN SATURATION: 98 %

## 2017-12-28 VITALS — HEIGHT: 67 IN | WEIGHT: 132.28 LBS | BODY MASS INDEX: 20.76 KG/M2

## 2017-12-28 DIAGNOSIS — D57.00: Primary | ICD-10-CM

## 2017-12-28 DIAGNOSIS — Z91.14: ICD-10-CM

## 2017-12-28 LAB
BASOPHILS # BLD AUTO: 0.1 TH/MM3 (ref 0–0.2)
BASOPHILS NFR BLD: 0.6 % (ref 0–2)
BUN SERPL-MCNC: 8 MG/DL (ref 7–18)
CALCIUM SERPL-MCNC: 8.6 MG/DL (ref 8.5–10.1)
CHLORIDE SERPL-SCNC: 110 MEQ/L (ref 98–107)
CREAT SERPL-MCNC: 0.5 MG/DL (ref 0.6–1.3)
EOSINOPHIL # BLD: 0.3 TH/MM3 (ref 0–0.4)
EOSINOPHIL NFR BLD: 3.4 % (ref 0–4)
ERYTHROCYTE [DISTWIDTH] IN BLOOD BY AUTOMATED COUNT: 18.6 % (ref 11.6–17.2)
GFR SERPLBLD BASED ON 1.73 SQ M-ARVRAT: 248 ML/MIN (ref 89–?)
GLUCOSE SERPL-MCNC: 85 MG/DL (ref 74–106)
HCO3 BLD-SCNC: 24.3 MEQ/L (ref 21–32)
HCT VFR BLD CALC: 21.2 % (ref 39–51)
HGB BLD-MCNC: 6.9 GM/DL (ref 13–17)
HOWELL-JOLLY BOD BLD QL SMEAR: PRESENT
LYMPHOCYTES # BLD AUTO: 3.1 TH/MM3 (ref 1–4.8)
LYMPHOCYTES NFR BLD AUTO: 30 % (ref 9–44)
LYMPHOCYTES: 34 % (ref 9–44)
MCH RBC QN AUTO: 27.8 PG (ref 27–34)
MCHC RBC AUTO-ENTMCNC: 32.6 % (ref 32–36)
MCV RBC AUTO: 85.3 FL (ref 80–100)
MONOCYTE #: 0.5 TH/MM3 (ref 0–0.9)
MONOCYTES NFR BLD: 5.3 % (ref 0–8)
MONOCYTES: 6 % (ref 0–8)
MYELOCYTES NFR BLD: 1 % (ref 0–0)
NEUTROPHILS # BLD AUTO: 6.2 TH/MM3 (ref 1.8–7.7)
NEUTROPHILS NFR BLD AUTO: 60.7 % (ref 16–70)
NEUTS BAND # BLD MANUAL: 5.8 TH/MM3 (ref 1.8–7.7)
NEUTS SEG NFR BLD MANUAL: 56 % (ref 16–70)
NUCLEATED RED BLOOD CELL: 2 (ref 0–0)
OVALOCYTES BLD QL SMEAR: (no result)
PLATELET # BLD: 263 TH/MM3 (ref 150–450)
PMV BLD AUTO: 7.4 FL (ref 7–11)
POLYCHROMASIA BLD QL SMEAR: 3.1 % (ref 0–1.9)
RBC # BLD AUTO: 2.49 MIL/MM3 (ref 4.5–5.9)
RETIC #: 257.5 MIL/L (ref 20–150)
RETICS/RBC NFR: 10.4 % (ref 0.4–3)
SODIUM SERPL-SCNC: 141 MEQ/L (ref 136–145)
TARGETS BLD QL SMEAR: (no result)
WBC # BLD AUTO: 10.2 TH/MM3 (ref 4–11)
WBC NRBC COR # BLD: 2 /100 WBC (ref 0–0)

## 2017-12-28 PROCEDURE — 99284 EMERGENCY DEPT VISIT MOD MDM: CPT

## 2017-12-28 PROCEDURE — 96375 TX/PRO/DX INJ NEW DRUG ADDON: CPT

## 2017-12-28 PROCEDURE — 80048 BASIC METABOLIC PNL TOTAL CA: CPT

## 2017-12-28 PROCEDURE — 85027 COMPLETE CBC AUTOMATED: CPT

## 2017-12-28 PROCEDURE — 96376 TX/PRO/DX INJ SAME DRUG ADON: CPT

## 2017-12-28 PROCEDURE — 96361 HYDRATE IV INFUSION ADD-ON: CPT

## 2017-12-28 PROCEDURE — 96374 THER/PROPH/DIAG INJ IV PUSH: CPT

## 2017-12-28 PROCEDURE — 85007 BL SMEAR W/DIFF WBC COUNT: CPT

## 2017-12-28 PROCEDURE — 85044 MANUAL RETICULOCYTE COUNT: CPT

## 2017-12-28 NOTE — PD
HPI


Chief Complaint:  Sickle Cell


Time Seen by Provider:  07:59


Travel History


International Travel<30 days:  No


Contact w/Intl Traveler<30days:  No


Traveled to known affect area:  No





History of Present Illness


HPI


22-year-old male history of sickle cell anemia arrives complaining of chest 

pain and total body arthralgias.  He reports this is typical for him when he 

has a sickle cell exacerbation.  He follows with Dr. Glover who is out of 

town.  He denies fever.  He denies shortness of breath.  He reports a fair 

amount of stress related to the holiday and his job.  Severity moderate.  

Timing constant.  He reports noncompliance with hydroxyurea and no Percocet 

available at home.  Both are secondary to the unavailability of medication as 

reported per patient.





PFSH


Past Medical History


Hx Anticoagulant Therapy:  No


Anemia:  Yes (SICKLE CELL)


Arthritis:  No


Asthma:  No


Autoimmune Disease:  No


Blood Disorders:  Yes ( G6PD DEFICIENCY)


Anxiety:  No


Depression:  No


Heart Rhythm Problems:  No


Cancer:  No


Cardiovascular Problems:  Yes (heart murmur)


High Cholesterol:  No


Chemotherapy:  No


Chest Pain:  Yes


Congestive Heart Failure:  No


COPD:  No


Cerebrovascular Accident:  No


Diabetes:  No


Diminished Hearing:  No


Endocrine:  No


Gastrointestinal Disorders:  Yes (gall stones)


Genetic Disorder:  Yes (SICKLE CELL, G6PD DEFICIENCY)


GERD:  No


Genitourinary:  No


Headaches:  No


Hiatal Hernia:  No


Immune Disorder:  Yes (SICKLE CELL)


Implanted Vascular Access Dvce:  No


Kidney Stones:  No


Musculoskeletal:  No


Neurologic:  No


Psychiatric:  Yes


Reproductive:  No


Respiratory:  No


Immunizations Current:  Yes


Migraines:  No


Pneumonia:  Yes


Radiation Therapy:  No


Renal Failure:  No


Seizures:  No


Sickle Cell Disease:  Yes


Sleep Apnea:  No


Thyroid Disease:  No


Ulcer:  No





Past Surgical History


Abdominal Surgery:  No


AICD:  No


Arteriovenous Shunt:  No


Cardiac Surgery:  No


Ear Surgery:  No


Endocrine Surgery:  No


Eye Surgery:  No


Genitourinary Surgery:  No


Gynecologic Surgery:  No


Hysterectomy:  No


Insulin Pump:  No


Joint Replacement:  No


Oral Surgery:  Yes (dental surgery "AS A SMALL CHILD")


Pacemaker:  No


Thoracic Surgery:  No


Other Surgery:  Yes (PICC LINE /picc line removal)





Social History


Alcohol Use:  No


Tobacco Use:  No


Substance Use:  No





Allergies-Medications


(Allergen,Severity, Reaction):  


Coded Allergies:  


     Sulfa (Sulfonamide Antibiotics) (Unverified  Allergy, Intermediate, itching

, 12/28/17)


     morphine (Unverified  Adverse Reaction, Mild, itching, 12/28/17)


 states it doesn't work and causes him to itch


Uncoded Allergies:  


     LEONARD BEANS (Allergy, Intermediate, 10/10/14)


Reported Meds & Prescriptions





Reported Meds & Active Scripts


Active


Percocet (Oxycodone-Acetaminophen) 7.5-325 mg Tab 1 Tab PO Q6H PRN


Folic Acid 1 Mg Tablet 1 Mg PO DAILY


Hydrea (Hydroxyurea) 500 Mg Cap 500 Mg PO DAILY


Hydrocodone-Acetaminophen  mg Tab 1 Tab PO Q6H PRN


Reported


Folic Acid 400 Mcg Tab 400 Mcg PO DAILY








Review of Systems


Except as stated in HPI:  all other systems reviewed are Neg


General / Constitutional:  No: Fever





Physical Exam


Narrative


GENERAL: 22-year-old male well-nourished well-developed pleasant


SKIN: Focused skin assessment warm/dry.


HEAD: Atraumatic. Normocephalic. 


EYES: Pupils equal and round. No scleral icterus. No injection or drainage. 


ENT: No nasal bleeding or discharge.  Mucous membranes pink and moist.


NECK: Trachea midline. No JVD. 


CARDIOVASCULAR:   The rhythm is regular.  The rate is about 90.  


RESPIRATORY: Is no significant tachypnea or respiratory distress.  The lungs 

are clear bilaterally.


GASTROINTESTINAL: Abdomen soft, non-tender, nondistended. Hepatic and splenic 

margins not palpable. 


MUSCULOSKELETAL: No obvious deformities. No clubbing.  No cyanosis.  No edema. 


NEUROLOGICAL: Awake and alert. No obvious cranial nerve deficits.  Motor 

grossly within normal limits. Normal speech.


PSYCHIATRIC: Appropriate mood and affect; insight and judgment normal.





Data


Data


Last Documented VS





Vital Signs








  Date Time  Temp Pulse Resp B/P (MAP) Pulse Ox O2 Delivery O2 Flow Rate FiO2


 


12/28/17 11:35  68 18 116/67 (83) 98   


 


12/28/17 08:21      Room Air  


 


12/28/17 07:36 97.7       











Vital Signs








  Date Time  Temp Pulse Resp B/P (MAP) Pulse Ox O2 Delivery O2 Flow Rate FiO2


 


12/28/17 11:35  68 18 116/67 (83) 98   


 


12/28/17 08:21     98 Room Air  


 


12/28/17 07:51  72 18 118/61 (80) 98 Room Air  


 


12/28/17 07:36 97.7 80 22 110/63 (79) 99   








Orders





 Orders


Basic Metabolic Panel (Bmp) (12/28/17 08:04)


Complete Blood Count With Diff (12/28/17 08:04)


Ecg Monitoring (12/28/17 08:04)


Iv Access Insert/Monitor (12/28/17 08:04)


Oximetry (12/28/17 08:04)


Ondansetron Inj (Zofran Inj) (12/28/17 08:15)


Sodium Chloride 0.9% Flush (Ns Flush) (12/28/17 08:15)


Sodium Chlor 0.9% 1000 Ml Inj (Ns 1000 M (12/28/17 08:04)


Diphenhydramine Inj (Benadryl Inj) (12/28/17 08:15)


Hydromorphone Pf Inj (Dilaudid Pf Inj) (12/28/17 08:15)


Sodium Chlor 0.9% 1000 Ml Inj (Ns 1000 M (12/28/17 08:36)


Hydromorphone Pf Inj (Dilaudid Pf Inj) (12/28/17 08:45)


Retic Count (12/28/17 09:25)


Hydromorphone Pf Inj (Dilaudid Pf Inj) (12/28/17 10:00)


Ed Discharge Order (12/28/17 10:24)





Labs





Laboratory Tests








Test


  12/28/17


08:15


 


White Blood Count 10.2 TH/MM3 


 


Red Blood Count 2.49 MIL/MM3 


 


Hemoglobin 6.9 GM/DL 


 


Hematocrit 21.2 % 


 


Mean Corpuscular Volume 85.3 FL 


 


Mean Corpuscular Hemoglobin 27.8 PG 


 


Mean Corpuscular Hemoglobin


Concent 32.6 % 


 


 


Red Cell Distribution Width 18.6 % 


 


Platelet Count 263 TH/MM3 


 


Mean Platelet Volume 7.4 FL 


 


Neutrophils (%) (Auto) 60.7 % 


 


Lymphocytes (%) (Auto) 30.0 % 


 


Monocytes (%) (Auto) 5.3 % 


 


Eosinophils (%) (Auto) 3.4 % 


 


Basophils (%) (Auto) 0.6 % 


 


Neutrophils # (Auto) 6.2 TH/MM3 


 


Lymphocytes # (Auto) 3.1 TH/MM3 


 


Monocytes # (Auto) 0.5 TH/MM3 


 


Eosinophils # (Auto) 0.3 TH/MM3 


 


Basophils # (Auto) 0.1 TH/MM3 


 


CBC Comment AUTO DIFF 


 


Differential Total Cells


Counted 100 


 


 


Neutrophils % (Manual) 56 % 


 


Lymphocytes % 34 % 


 


Monocytes % 6 % 


 


Eosinophils % 3 % 


 


Neutrophils # (Manual) 5.8 TH/MM3 


 


Myelocytes 1 % 


 


Nucleated Red Blood Cells 2 /100 WBC 


 


Differential Comment


  FINAL DIFF


MANUAL


 


Platelet Estimate NORMAL 


 


Platelet Morphology Comment NORMAL 


 


Polychromasia 3.1 % 


 


Target Cells 2+ 


 


Ovalocytes 1+ 


 


Baird-[place] Bodies PRESENT 


 


Red Cell Morphology Comment  


 


Reticulocyte Count 10.4 % 


 


Absolute Reticulocyte Count 257.5 MIL/L 


 


Blood Urea Nitrogen 8 MG/DL 


 


Creatinine 0.50 MG/DL 


 


Random Glucose 85 MG/DL 


 


Calcium Level 8.6 MG/DL 


 


Sodium Level 141 MEQ/L 


 


Potassium Level 4.1 MEQ/L 


 


Chloride Level 110 MEQ/L 


 


Carbon Dioxide Level 24.3 MEQ/L 


 


Anion Gap 7 MEQ/L 


 


Estimat Glomerular Filtration


Rate 248 ML/MIN 


 











[facility]


Medical Decision Making


Medical Screen Exam Complete:  Yes


Emergency Medical Condition:  Yes


Medical Record Reviewed:  Yes


Differential Diagnosis


avascular necrosis, acute chest syndrome, anemia, septic arthritis, pulmonary 

embolism


Narrative Course


CBC & BMP Diagram


12/28/17 08:15








Calcium Level 8.6





IV fluids given.  The patient's hemoglobin is 6.9 and although load should not 

require admission.  The patient does have elevated reticulocyte count 

consistent with intact bone marrow function.  Follow-up with hematology 

discussed.  Prescription refills provided per patient's request.





Diagnosis





 Primary Impression:  


 Sickle cell anemia


 Qualified Codes:  D57.00 - Hb-SS disease with crisis, unspecified


Referrals:  


Deanna Glover MD


call for appointment


***Med/Other Pt SpecificInfo:  No Change to Meds


Scripts


Oxycodone-Acetaminophen (Percocet) 7.5-325 mg Tab


1 TAB PO Q6H Y for PAIN SCALE 6 TO 10, #18 TAB 0 Refills


   Prov: Thanh Matthew MD         12/28/17 


Folic Acid (Folic Acid) 1 Mg Tablet


1 MG PO DAILY, #30 TAB


   Prov: Thanh Matthew MD         12/28/17 


Hydroxyurea (Hydrea) 500 Mg Cap


500 MG PO DAILY, #30 CAP


   Prov: Thanh Matthew MD         12/28/17


Disposition:  01 DISCHARGE HOME


Condition:  Stable











Thanh Matthew MD Dec 28, 2017 10:24

## 2017-12-31 ENCOUNTER — HOSPITAL ENCOUNTER (EMERGENCY)
Dept: HOSPITAL 17 - NEPC | Age: 22
Discharge: HOME | End: 2017-12-31
Payer: COMMERCIAL

## 2017-12-31 VITALS — DIASTOLIC BLOOD PRESSURE: 64 MMHG | SYSTOLIC BLOOD PRESSURE: 117 MMHG

## 2017-12-31 VITALS — OXYGEN SATURATION: 98 % | RESPIRATION RATE: 18 BRPM | HEART RATE: 87 BPM

## 2017-12-31 VITALS
HEART RATE: 73 BPM | TEMPERATURE: 98.8 F | SYSTOLIC BLOOD PRESSURE: 107 MMHG | OXYGEN SATURATION: 98 % | RESPIRATION RATE: 16 BRPM | DIASTOLIC BLOOD PRESSURE: 54 MMHG

## 2017-12-31 VITALS — HEIGHT: 67 IN | BODY MASS INDEX: 20.76 KG/M2 | WEIGHT: 132.28 LBS

## 2017-12-31 DIAGNOSIS — D57.1: Primary | ICD-10-CM

## 2017-12-31 DIAGNOSIS — M79.606: ICD-10-CM

## 2017-12-31 DIAGNOSIS — Z88.5: ICD-10-CM

## 2017-12-31 DIAGNOSIS — R07.9: ICD-10-CM

## 2017-12-31 DIAGNOSIS — M54.9: ICD-10-CM

## 2017-12-31 DIAGNOSIS — Z88.2: ICD-10-CM

## 2017-12-31 DIAGNOSIS — Z79.899: ICD-10-CM

## 2017-12-31 DIAGNOSIS — R01.1: ICD-10-CM

## 2017-12-31 LAB
ALBUMIN SERPL-MCNC: 4.5 GM/DL (ref 3.4–5)
ALP SERPL-CCNC: 64 U/L (ref 45–117)
ALT SERPL-CCNC: 17 U/L (ref 12–78)
AST SERPL-CCNC: 37 U/L (ref 15–37)
BASOPHILS # BLD AUTO: 0 TH/MM3 (ref 0–0.2)
BASOPHILS NFR BLD: 0.1 % (ref 0–2)
BILIRUB SERPL-MCNC: 2.9 MG/DL (ref 0.2–1)
BUN SERPL-MCNC: 7 MG/DL (ref 7–18)
CALCIUM SERPL-MCNC: 8.7 MG/DL (ref 8.5–10.1)
CHLORIDE SERPL-SCNC: 108 MEQ/L (ref 98–107)
COLOR UR: YELLOW
CREAT SERPL-MCNC: 0.64 MG/DL (ref 0.6–1.3)
EOSINOPHIL # BLD: 0.2 TH/MM3 (ref 0–0.4)
EOSINOPHIL NFR BLD: 2.3 % (ref 0–4)
ERYTHROCYTE [DISTWIDTH] IN BLOOD BY AUTOMATED COUNT: 16.8 % (ref 11.6–17.2)
GFR SERPLBLD BASED ON 1.73 SQ M-ARVRAT: 186 ML/MIN (ref 89–?)
GLUCOSE SERPL-MCNC: 85 MG/DL (ref 74–106)
GLUCOSE UR STRIP-MCNC: (no result) MG/DL
HCO3 BLD-SCNC: 26.7 MEQ/L (ref 21–32)
HCT VFR BLD CALC: 23.5 % (ref 39–51)
HGB BLD-MCNC: 7.5 GM/DL (ref 13–17)
HGB S BLD QL SOLY: (no result)
HGB UR QL STRIP: (no result)
HOWELL-JOLLY BOD BLD QL SMEAR: PRESENT
KETONES UR STRIP-MCNC: (no result) MG/DL
LYMPHOCYTES # BLD AUTO: 1.8 TH/MM3 (ref 1–4.8)
LYMPHOCYTES NFR BLD AUTO: 18.6 % (ref 9–44)
LYMPHOCYTES: 22 % (ref 9–44)
MCH RBC QN AUTO: 27.7 PG (ref 27–34)
MCHC RBC AUTO-ENTMCNC: 32 % (ref 32–36)
MCV RBC AUTO: 86.5 FL (ref 80–100)
MONOCYTE #: 0.6 TH/MM3 (ref 0–0.9)
MONOCYTES NFR BLD: 5.7 % (ref 0–8)
MONOCYTES: 8 % (ref 0–8)
MUCOUS THREADS #/AREA URNS LPF: (no result) /LPF
MYELOCYTES NFR BLD: 1 % (ref 0–0)
NEUTROPHILS # BLD AUTO: 7.1 TH/MM3 (ref 1.8–7.7)
NEUTROPHILS NFR BLD AUTO: 73.3 % (ref 16–70)
NEUTS BAND # BLD MANUAL: 6.4 TH/MM3 (ref 1.8–7.7)
NEUTS BAND NFR BLD: 1 % (ref 0–6)
NEUTS SEG NFR BLD MANUAL: 64 % (ref 16–70)
NITRITE UR QL STRIP: (no result)
NUCLEATED RED BLOOD CELL: 5 (ref 0–0)
PLATELET # BLD: 242 TH/MM3 (ref 150–450)
PMV BLD AUTO: 7.5 FL (ref 7–11)
POLYCHROMASIA BLD QL SMEAR: 2 % (ref 0–1.9)
PROT SERPL-MCNC: 8 GM/DL (ref 6.4–8.2)
RBC # BLD AUTO: 2.72 MIL/MM3 (ref 4.5–5.9)
RETIC #: 247.4 MIL/L (ref 20–150)
RETICS/RBC NFR: 9.1 % (ref 0.4–3)
SODIUM SERPL-SCNC: 142 MEQ/L (ref 136–145)
SP GR UR STRIP: 1.01 (ref 1–1.03)
TARGETS BLD QL SMEAR: (no result)
URINE LEUKOCYTE ESTERASE: (no result)
WBC # BLD AUTO: 9.7 TH/MM3 (ref 4–11)
WBC NRBC COR # BLD: 5 /100 WBC (ref 0–0)

## 2017-12-31 PROCEDURE — 85044 MANUAL RETICULOCYTE COUNT: CPT

## 2017-12-31 PROCEDURE — 85007 BL SMEAR W/DIFF WBC COUNT: CPT

## 2017-12-31 PROCEDURE — 85027 COMPLETE CBC AUTOMATED: CPT

## 2017-12-31 PROCEDURE — 96361 HYDRATE IV INFUSION ADD-ON: CPT

## 2017-12-31 PROCEDURE — 71010: CPT

## 2017-12-31 PROCEDURE — 96375 TX/PRO/DX INJ NEW DRUG ADDON: CPT

## 2017-12-31 PROCEDURE — 80053 COMPREHEN METABOLIC PANEL: CPT

## 2017-12-31 PROCEDURE — 81001 URINALYSIS AUTO W/SCOPE: CPT

## 2017-12-31 PROCEDURE — 96376 TX/PRO/DX INJ SAME DRUG ADON: CPT

## 2017-12-31 PROCEDURE — 99284 EMERGENCY DEPT VISIT MOD MDM: CPT

## 2017-12-31 PROCEDURE — 96374 THER/PROPH/DIAG INJ IV PUSH: CPT

## 2017-12-31 NOTE — RADRPT
EXAM DATE/TIME:  12/31/2017 11:54 

 

[facility] COMPARISON:     

CHEST SINGLE AP, December 06, 2017, 9:45.

 

                     

INDICATIONS :     

Short of breath. 

                     

 

MEDICAL HISTORY :            

Sickle cell disease.    

 

SURGICAL HISTORY :     

None.   

 

ENCOUNTER:     

Initial                                        

 

ACUITY:     

1 day      

 

PAIN SCORE:     

0/10

 

LOCATION:     

Bilateral chest 

 

FINDINGS:     

A single view of the chest demonstrates the lungs to be symmetrically aerated without evidence of mas
s, infiltrate or effusion.  The cardiomediastinal contours are unremarkable.  Osseous structures are 
intact.

 

CONCLUSION:     No acute disease.  

 

 

 

 Carlitos Rosales MD on December 31, 2017 at 12:21           

Board Certified Radiologist.

 This report was verified electronically.

## 2017-12-31 NOTE — PD
HPI


Chief Complaint:  Sickle Cell


Time Seen by Provider:  11:39


Travel History


International Travel<30 days:  No


Contact w/Intl Traveler<30days:  No


Traveled to known affect area:  No





History of Present Illness


HPI


22-year-old male that presents to the ED for evaluation of sickle-cell disease.

  Patient has a chronic history of sickle cell disease.  Per patient he follows 

with hematologist by his hematologist currently on medication for 3 weeks.  He 

was seen here about 3 days ago and was seen and evaluated and had blood work 

that showed sickle cell anemia both at the time patient felt improved and was 

released.  Per patient she's been better until today.  He developed chest pain 

as well as back pain and leg pain.  Patient states that his been compliant with 

the medications given to him including the hydroxyurea and the Percocet.  He 

states that even with taking the Percocet he developed more pain.  He came here 

for evaluation.  He denies any cough or runny nose.  No fevers chills or 

sweats.  He does state having chest pain.  Per patient the symptoms feel very 

similar to her previous attacks.  He's had multiple gestation the past but 

nothing recently.  He does have allergies to morphine, sulfa and family.  He 

states that his pain is currently 10 out of 10





PFSH


Past Medical History


Hx Anticoagulant Therapy:  No


Anemia:  Yes (SICKLE CELL)


Arthritis:  No


Asthma:  No


Autoimmune Disease:  No


Blood Disorders:  Yes ( G6PD DEFICIENCY)


Anxiety:  No


Depression:  No


Heart Rhythm Problems:  No


Cancer:  No


Cardiovascular Problems:  Yes (heart murmur)


High Cholesterol:  No


Chemotherapy:  No


Chest Pain:  Yes


Congestive Heart Failure:  No


COPD:  No


Cerebrovascular Accident:  No


Diabetes:  No


Diminished Hearing:  No


Endocrine:  No


Gastrointestinal Disorders:  Yes (gall stones)


Genetic Disorder:  Yes (SICKLE CELL, G6PD DEFICIENCY)


GERD:  No


Genitourinary:  No


Headaches:  No


Hiatal Hernia:  No


Immune Disorder:  Yes (SICKLE CELL)


Implanted Vascular Access Dvce:  No


Kidney Stones:  No


Musculoskeletal:  No


Neurologic:  No


Psychiatric:  Yes


Reproductive:  No


Respiratory:  No


Immunizations Current:  Yes


Migraines:  No


Pneumonia:  Yes


Radiation Therapy:  No


Renal Failure:  No


Seizures:  No


Sickle Cell Disease:  Yes


Sleep Apnea:  No


Thyroid Disease:  No


Ulcer:  No


Tetanus Vaccination:  Unknown





Past Surgical History


Abdominal Surgery:  No


AICD:  No


Arteriovenous Shunt:  No


Cardiac Surgery:  No


Ear Surgery:  No


Endocrine Surgery:  No


Eye Surgery:  No


Genitourinary Surgery:  No


Gynecologic Surgery:  No


Hysterectomy:  No


Insulin Pump:  No


Joint Replacement:  No


Oral Surgery:  Yes (dental surgery "AS A SMALL CHILD")


Pacemaker:  No


Thoracic Surgery:  No


Other Surgery:  Yes (PICC LINE /picc line removal)





Social History


Alcohol Use:  No


Tobacco Use:  No


Substance Use:  No





Allergies-Medications


(Allergen,Severity, Reaction):  


Coded Allergies:  


     Sulfa (Sulfonamide Antibiotics) (Unverified  Allergy, Intermediate, itching

, 12/31/17)


     morphine (Unverified  Adverse Reaction, Mild, itching, 12/31/17)


 states it doesn't work and causes him to itch


Uncoded Allergies:  


     LEONARD BEANS (Allergy, Intermediate, 10/10/14)


Reported Meds & Prescriptions





Reported Meds & Active Scripts


Active


Percocet (Oxycodone-Acetaminophen) 7.5-325 mg Tab 1 Tab PO Q6H PRN


Folic Acid 1 Mg Tablet 1 Mg PO DAILY


Hydrea (Hydroxyurea) 500 Mg Cap 500 Mg PO DAILY


Hydrocodone-Acetaminophen  mg Tab 1 Tab PO Q6H PRN


Reported


Folic Acid 400 Mcg Tab 400 Mcg PO DAILY








Review of Systems


Except as stated in HPI:  all other systems reviewed are Neg





Physical Exam


Narrative


GENERAL: 


SKIN: Warm and dry.


HEAD: Atraumatic. Normocephalic. 


EYES: Pupils equal and round. No scleral icterus. No injection or drainage. 


ENT: No nasal bleeding or discharge.  Mucous membranes pink and moist.  Tongue 

is midline.  No uvula deviation.


NECK: Trachea midline. No JVD. 


CARDIOVASCULAR: Regular rate and rhythm.  No murmurs, S3, S4.


RESPIRATORY: No accessory muscle use. Clear to auscultation. Breath sounds 

equal bilaterally. 


GASTROINTESTINAL: Abdomen soft, non-tender, nondistended. Hepatic and splenic 

margins not palpable. 


MUSCULOSKELETAL: Extremities without clubbing, cyanosis, or edema. No obvious 

deformities.  Full range of motion of the upper and lower extremities 

bilaterally.  2+ pulses bilaterally.


NEUROLOGICAL: Awake and alert. No obvious cranial nerve deficits.  Motor 

grossly within normal limits. Five out of 5 muscle strength in the arms and 

legs.  Normal speech.


PSYCHIATRIC: Appropriate mood and affect; insight and judgment normal.





Data


Data


Last Documented VS





Vital Signs








  Date Time  Temp Pulse Resp B/P (MAP) Pulse Ox O2 Delivery O2 Flow Rate FiO2


 


12/31/17 12:02  87 18  98 Room Air  


 


12/31/17 11:26 98.8       








Orders





 Orders


Complete Blood Count With Diff (12/31/17 11:39)


Comprehensive Metabolic Panel (12/31/17 11:39)


Retic Count (12/31/17 11:39)


Urinalysis - C+S If Indicated (12/31/17 11:39)


Chest, Single Ap (12/31/17 11:39)


Ecg Monitoring (12/31/17 11:39)


Iv Access Insert/Monitor (12/31/17 11:39)


Oximetry (12/31/17 11:39)


Ondansetron Inj (Zofran Inj) (12/31/17 11:45)


Sodium Chlor 0.9% 1000 Ml Inj (Ns 1000 M (12/31/17 11:39)


Hydromorphone Pf Inj (Dilaudid Pf Inj) (12/31/17 11:45)


Hydromorphone Pf Inj (Dilaudid Pf Inj) (12/31/17 13:00)


Sodium Chlor 0.9% 1000 Ml Inj (Ns 1000 M (12/31/17 13:15)


Ketorolac Inj (Toradol Inj) (12/31/17 13:30)


Hydromorphone Pf Inj (Dilaudid Pf Inj) (12/31/17 13:30)


Ed Discharge Order (12/31/17 13:38)


Hydromorphone Pf Inj (Dilaudid Pf Inj) (12/31/17 14:00)





Labs





Laboratory Tests








Test


  12/31/17


11:52 12/31/17


12:50


 


White Blood Count 9.7 TH/MM3  


 


Red Blood Count 2.72 MIL/MM3  


 


Hemoglobin 7.5 GM/DL  


 


Hematocrit 23.5 %  


 


Mean Corpuscular Volume 86.5 FL  


 


Mean Corpuscular Hemoglobin 27.7 PG  


 


Mean Corpuscular Hemoglobin


Concent 32.0 % 


  


 


 


Red Cell Distribution Width 16.8 %  


 


Platelet Count 242 TH/MM3  


 


Mean Platelet Volume 7.5 FL  


 


Neutrophils (%) (Auto) 73.3 %  


 


Lymphocytes (%) (Auto) 18.6 %  


 


Monocytes (%) (Auto) 5.7 %  


 


Eosinophils (%) (Auto) 2.3 %  


 


Basophils (%) (Auto) 0.1 %  


 


Neutrophils # (Auto) 7.1 TH/MM3  


 


Lymphocytes # (Auto) 1.8 TH/MM3  


 


Monocytes # (Auto) 0.6 TH/MM3  


 


Eosinophils # (Auto) 0.2 TH/MM3  


 


Basophils # (Auto) 0.0 TH/MM3  


 


CBC Comment AUTO DIFF  


 


Differential Total Cells


Counted 100 


  


 


 


Neutrophils % (Manual) 64 %  


 


Band Neutrophils % 1 %  


 


Lymphocytes % 22 %  


 


Monocytes % 8 %  


 


Eosinophils % 4 %  


 


Neutrophils # (Manual) 6.4 TH/MM3  


 


Myelocytes 1 %  


 


Nucleated Red Blood Cells 5 /100 WBC  


 


Differential Comment


  FINAL DIFF


MANUAL 


 


 


Platelet Estimate NORMAL  


 


Platelet Morphology Comment NORMAL  


 


Polychromasia 2.0 %  


 


Sickle Cells 1+  


 


Target Cells 1+  


 


Baird-Hinton Bodies PRESENT  


 


Red Cell Morphology Comment   


 


Reticulocyte Count 9.1 %  


 


Absolute Reticulocyte Count 247.4 MIL/L  


 


Blood Urea Nitrogen 7 MG/DL  


 


Creatinine 0.64 MG/DL  


 


Random Glucose 85 MG/DL  


 


Total Protein 8.0 GM/DL  


 


Albumin 4.5 GM/DL  


 


Calcium Level 8.7 MG/DL  


 


Alkaline Phosphatase 64 U/L  


 


Aspartate Amino Transf


(AST/SGOT) 37 U/L 


  


 


 


Alanine Aminotransferase


(ALT/SGPT) 17 U/L 


  


 


 


Total Bilirubin 2.9 MG/DL  


 


Sodium Level 142 MEQ/L  


 


Potassium Level 3.6 MEQ/L  


 


Chloride Level 108 MEQ/L  


 


Carbon Dioxide Level 26.7 MEQ/L  


 


Anion Gap 7 MEQ/L  


 


Estimat Glomerular Filtration


Rate 186 ML/MIN 


  


 


 


Urine Color  YELLOW 


 


Urine Turbidity  CLEAR 


 


Urine pH  7.5 


 


Urine Specific Gravity  1.011 


 


Urine Protein  NEG mg/dL 


 


Urine Glucose (UA)  NEG mg/dL 


 


Urine Ketones  NEG mg/dL 


 


Urine Occult Blood  NEG 


 


Urine Nitrite  NEG 


 


Urine Bilirubin  NEG 


 


Urine Urobilinogen  4.0 MG/DL 


 


Urine Leukocyte Esterase  NEG 


 


Urine WBC


  


  LESS THAN 1


/hpf


 


Urine Mucus  FEW /lpf 


 


Microscopic Urinalysis Comment


  


  CULT NOT


INDICATED











MDM


Medical Decision Making


Medical Screen Exam Complete:  Yes


Emergency Medical Condition:  Yes


Medical Record Reviewed:  Yes


Interpretation(s)


CBC & BMP Diagram


12/31/17 11:52








Total Protein 8.0, Albumin 4.5, Calcium Level 8.7, Alkaline Phosphatase 64, 

Aspartate Amino Transf (AST/SGOT) 37, Alanine Aminotransferase (ALT/SGPT) 17, 

Total Bilirubin 2.9 H








Last Impressions








Chest X-Ray 12/31/17 1139 Signed





Impressions: 





 Service Date/Time:  Sunday, December 31, 2017 11:54 - CONCLUSION: No acute 





 disease.       Carlitos Rosales MD 





Reticular cell count in the 200s


UA negative for acute disease


Differential Diagnosis


Sickle cell pain versus chest pain versus sickle cell anemia versus normal exam


Narrative Course


22-year-old male that presents to the ED for evaluation of sickle-cell disease.

  Patient was properly examined and was found to have signs and symptoms which 

appear to be consistent with sickle cell disease.  He has a history of this in 

the past.  He has been seen here multiple times for similar.  He appears to be 

stable at this time.  Vitals are stable.  He was just seen here 2 days ago he 

did have sickle cell anemia at the time but he was then admitted that he had 

good resolution of his symptoms.  Patient apparently is noncompliant per 

previous providers note as he has not been taking any of his hydroxyurea or his 

folic acid.  At this time labs and imaging were ordered.  Labs and imaging were 

essentially unremarkable other than for sickle cell anemia.  He was given IV 

pain medication and fluids with good results.  Patient states that his pain has 

come down.  Patient was given 1 more dose of pain medication.  Case was 

discussed in my attending Dr. Joel who recommends discharge.  Nurse and I had a 

discussion with the patient.  Patient feels like his been mistreated.  Patient 

was console to his been treated like any other patient.  He has received 4 mg 

of Dilaudid and Toradol and 2 boluses of fluids.  I did offer admission to the 

hospital for altered for pain management.  Patient does not want to stay.  

Patient still upset.  Patient opted to leave.





Diagnosis





 Primary Impression:  


 Sickle cell anemia


 Qualified Codes:  D57.00 - Hb-SS disease with crisis, unspecified


Patient Instructions:  General Instructions, Narcotic given in the ED





***Additional Instructions:  


Take medications as prescribed.


Follow-up with PCP.


See ED for any worsening symptoms.


Do not drink or drive while taking pain medication.


Apply ice or heat as needed for pain


***Med/Other Pt SpecificInfo:  Prescription(s) given


Disposition:  01 DISCHARGE HOME


Condition:  Stable











Venkat Haines Dec 31, 2017 12:54

## 2018-01-23 ENCOUNTER — HOSPITAL ENCOUNTER (EMERGENCY)
Dept: HOSPITAL 17 - NEPE | Age: 23
Discharge: HOME | End: 2018-01-23
Payer: COMMERCIAL

## 2018-01-23 VITALS
HEART RATE: 64 BPM | SYSTOLIC BLOOD PRESSURE: 100 MMHG | DIASTOLIC BLOOD PRESSURE: 55 MMHG | RESPIRATION RATE: 13 BRPM | TEMPERATURE: 98.4 F | OXYGEN SATURATION: 100 %

## 2018-01-23 VITALS — BODY MASS INDEX: 20.76 KG/M2 | HEIGHT: 67 IN | WEIGHT: 132.28 LBS

## 2018-01-23 VITALS
HEART RATE: 66 BPM | RESPIRATION RATE: 16 BRPM | OXYGEN SATURATION: 98 % | SYSTOLIC BLOOD PRESSURE: 106 MMHG | DIASTOLIC BLOOD PRESSURE: 51 MMHG

## 2018-01-23 VITALS
RESPIRATION RATE: 17 BRPM | HEART RATE: 71 BPM | DIASTOLIC BLOOD PRESSURE: 52 MMHG | SYSTOLIC BLOOD PRESSURE: 105 MMHG | OXYGEN SATURATION: 98 %
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
## 2018-01-23 DIAGNOSIS — D57.00: Primary | ICD-10-CM

## 2018-01-23 DIAGNOSIS — T14.8XXA: ICD-10-CM

## 2018-01-23 LAB
BASOPHILS # BLD AUTO: 0.1 TH/MM3 (ref 0–0.2)
BASOPHILS NFR BLD: 0.6 % (ref 0–2)
BUN SERPL-MCNC: 10 MG/DL (ref 7–18)
CALCIUM SERPL-MCNC: 9.1 MG/DL (ref 8.5–10.1)
CHLORIDE SERPL-SCNC: 108 MEQ/L (ref 98–107)
CREAT SERPL-MCNC: 0.58 MG/DL (ref 0.6–1.3)
CRP SERPL-MCNC: 0.87 MG/DL (ref 0–0.3)
EOSINOPHIL # BLD: 0.4 TH/MM3 (ref 0–0.4)
EOSINOPHIL NFR BLD: 3.7 % (ref 0–4)
ERYTHROCYTE [DISTWIDTH] IN BLOOD BY AUTOMATED COUNT: 15.5 % (ref 11.6–17.2)
GFR SERPLBLD BASED ON 1.73 SQ M-ARVRAT: 209 ML/MIN (ref 89–?)
GLUCOSE SERPL-MCNC: 90 MG/DL (ref 74–106)
HCO3 BLD-SCNC: 25.2 MEQ/L (ref 21–32)
HCT VFR BLD CALC: 23.9 % (ref 39–51)
HGB BLD-MCNC: 8 GM/DL (ref 13–17)
HGB S BLD QL SOLY: (no result)
LYMPHOCYTES # BLD AUTO: 2.3 TH/MM3 (ref 1–4.8)
LYMPHOCYTES NFR BLD AUTO: 21.1 % (ref 9–44)
LYMPHOCYTES: 27 % (ref 9–44)
MCH RBC QN AUTO: 27.3 PG (ref 27–34)
MCHC RBC AUTO-ENTMCNC: 33.6 % (ref 32–36)
MCV RBC AUTO: 81.1 FL (ref 80–100)
MONOCYTE #: 0.8 TH/MM3 (ref 0–0.9)
MONOCYTES NFR BLD: 7 % (ref 0–8)
MONOCYTES: 5 % (ref 0–8)
NEUTROPHILS # BLD AUTO: 7.5 TH/MM3 (ref 1.8–7.7)
NEUTROPHILS NFR BLD AUTO: 67.6 % (ref 16–70)
NEUTS BAND # BLD MANUAL: 6.9 TH/MM3 (ref 1.8–7.7)
NEUTS BAND NFR BLD: 1 % (ref 0–6)
NEUTS SEG NFR BLD MANUAL: 62 % (ref 16–70)
NUCLEATED RED BLOOD CELL: 2 (ref 0–0)
PLATELET # BLD: 280 TH/MM3 (ref 150–450)
PMV BLD AUTO: 7.8 FL (ref 7–11)
POLYCHROMASIA BLD QL SMEAR: 2 % (ref 0–1.9)
RBC # BLD AUTO: 2.95 MIL/MM3 (ref 4.5–5.9)
RETIC #: 253.1 MIL/L (ref 20–150)
RETICS/RBC NFR: 8.6 % (ref 0.4–3)
SODIUM SERPL-SCNC: 141 MEQ/L (ref 136–145)
TARGETS BLD QL SMEAR: (no result)
WBC # BLD AUTO: 11 TH/MM3 (ref 4–11)
WBC NRBC COR # BLD: 2 /100 WBC (ref 0–0)

## 2018-01-23 PROCEDURE — 85027 COMPLETE CBC AUTOMATED: CPT

## 2018-01-23 PROCEDURE — 71045 X-RAY EXAM CHEST 1 VIEW: CPT

## 2018-01-23 PROCEDURE — 96375 TX/PRO/DX INJ NEW DRUG ADDON: CPT

## 2018-01-23 PROCEDURE — 86140 C-REACTIVE PROTEIN: CPT

## 2018-01-23 PROCEDURE — 80048 BASIC METABOLIC PNL TOTAL CA: CPT

## 2018-01-23 PROCEDURE — 96376 TX/PRO/DX INJ SAME DRUG ADON: CPT

## 2018-01-23 PROCEDURE — 85044 MANUAL RETICULOCYTE COUNT: CPT

## 2018-01-23 PROCEDURE — 96374 THER/PROPH/DIAG INJ IV PUSH: CPT

## 2018-01-23 PROCEDURE — 99284 EMERGENCY DEPT VISIT MOD MDM: CPT

## 2018-01-23 PROCEDURE — 85007 BL SMEAR W/DIFF WBC COUNT: CPT

## 2018-01-23 NOTE — PD
HPI


Chief Complaint:  Sickle Cell


Time Seen by Provider:  10:36


Travel History


International Travel<30 days:  No


Contact w/Intl Traveler<30days:  No


Traveled to known affect area:  No





History of Present Illness


HPI


The patient is 22 years old and arrives to the ER complaining of sickle cell 

pain which for him involves the arms and legs and chest.  Duration is been 3 

days.  Onset gradual.  Timing constant.  He believes something may be seriously 

abnormal this time and that he might need a blood transfusion.  He believes it 

might be related to the cold weather lately.  No fever or cough.  No vomiting.





PFSH


Past Medical History


Hx Anticoagulant Therapy:  No


Anemia:  Yes (SICKLE CELL)


Arthritis:  No


Asthma:  No


Autoimmune Disease:  No


Blood Disorders:  Yes ( G6PD DEFICIENCY)


Anxiety:  No


Depression:  No


Heart Rhythm Problems:  No


Cancer:  No


Cardiovascular Problems:  Yes (heart murmur)


High Cholesterol:  No


Chemotherapy:  No


Chest Pain:  Yes


Congestive Heart Failure:  No


COPD:  No


Cerebrovascular Accident:  No


Diabetes:  No


Diminished Hearing:  No


Endocrine:  No


Gastrointestinal Disorders:  Yes (gall stones)


Genetic Disorder:  Yes (SICKLE CELL, G6PD DEFICIENCY)


GERD:  No


Genitourinary:  No


Headaches:  No


Hiatal Hernia:  No


Heparin Induced Thrombocytopen:  No


Immune Disorder:  Yes (SICKLE CELL)


Implanted Vascular Access Dvce:  No


Kidney Stones:  No


Musculoskeletal:  No


Neurologic:  No


Psychiatric:  Yes


Reproductive:  No


Respiratory:  No


Immunizations Current:  Yes


Migraines:  No


Pneumonia:  Yes


Radiation Therapy:  No


Renal Failure:  No


Seizures:  No


Sickle Cell Disease:  Yes


Sleep Apnea:  No


Thyroid Disease:  No


Ulcer:  No


Tetanus Vaccination:  Unknown


Influenza Vaccination:  Yes





Past Surgical History


Abdominal Surgery:  No


AICD:  No


Arteriovenous Shunt:  No


Cardiac Surgery:  No


Ear Surgery:  No


Endocrine Surgery:  No


Eye Surgery:  No


Genitourinary Surgery:  No


Gynecologic Surgery:  No


Hysterectomy:  No


Insulin Pump:  No


Joint Replacement:  No


Neurologic Surgery:  No


Oral Surgery:  Yes (dental surgery "AS A SMALL CHILD")


Pacemaker:  No


Thoracic Surgery:  No


Other Surgery:  Yes (PICC LINE /picc line removal)





Social History


Alcohol Use:  No


Tobacco Use:  No


Substance Use:  No





Allergies-Medications


(Allergen,Severity, Reaction):  


Coded Allergies:  


     Sulfa (Sulfonamide Antibiotics) (Unverified  Allergy, Intermediate, itching

, 1/23/18)


     morphine (Unverified  Adverse Reaction, Mild, itching, 1/23/18)


 states it doesn't work and causes him to itch


Uncoded Allergies:  


     LEONARD BEANS (Allergy, Intermediate, 10/10/14)


Reported Meds & Prescriptions





Reported Meds & Active Scripts


Active


Percocet (Oxycodone-Acetaminophen) 7.5-325 mg Tab 1 Tab PO Q6H PRN


Folic Acid 1 Mg Tablet 1 Mg PO DAILY


Hydrea (Hydroxyurea) 500 Mg Cap 500 Mg PO DAILY


Hydrocodone-Acetaminophen  mg Tab 1 Tab PO Q6H PRN


Reported


Folic Acid 400 Mcg Tab 400 Mcg PO DAILY








Review of Systems


Except as stated in HPI:  all other systems reviewed are Neg





Physical Exam


Narrative


GENERAL: 22-year-old male well-nourished well-developed, appears very itchy 

since arrival, personal belongings on pt's bed


SKIN: Warm and dry.


HEAD: Atraumatic. Normocephalic. 


EYES: Pupils equal and round. No scleral icterus. No injection or drainage. 


ENT: No nasal bleeding or discharge.  Mucous membranes pink and moist.


NECK: Trachea midline. No JVD. 


CARDIOVASCULAR: Regular rate and rhythm.  


RESPIRATORY: No accessory muscle use. Clear to auscultation. Breath sounds 

equal bilaterally. 


GASTROINTESTINAL: Abdomen soft, non-tender, nondistended. Hepatic and splenic 

margins not palpable. 


MUSCULOSKELETAL: Extremities without clubbing, cyanosis, or edema. No obvious 

deformities. 


NEUROLOGICAL: Awake and alert. No obvious cranial nerve deficits.  Motor 

grossly within normal limits. Five out of 5 muscle strength in the arms and 

legs.  Normal speech.


PSYCHIATRIC: Appropriate mood and affect; insight and judgment normal.





Data


Data


Last Documented VS





Vital Signs








  Date Time  Temp Pulse Resp B/P (MAP) Pulse Ox O2 Delivery O2 Flow Rate FiO2


 


1/23/18 14:49     (69)  Nasal Cannula 2.00 


 


1/23/18 13:30  66 16  98   


 


1/23/18 10:30 98.4       





VS Reviewed


Orders





 Orders


Basic Metabolic Panel (Bmp) (1/23/18 10:40)


C-Reactive Protein (Crp) (1/23/18 10:40)


Complete Blood Count With Diff (1/23/18 10:40)


Retic Count (1/23/18 10:40)


Chest, Single Ap (1/23/18 10:40)


Ecg Monitoring (1/23/18 10:40)


Iv Access Insert/Monitor (1/23/18 10:40)


Oximetry (1/23/18 10:40)


Hydromorphone Pf Inj (Dilaudid Pf Inj) (1/23/18 10:45)


Ondansetron Inj (Zofran Inj) (1/23/18 10:45)


Sodium Chloride 0.9% Flush (Ns Flush) (1/23/18 10:45)


Sodium Chlor 0.9% 1000 Ml Inj (Ns 1000 M (1/23/18 10:40)


Diphenhydramine Inj (Benadryl Inj) (1/23/18 10:45)


Hydromorphone Pf Inj (Dilaudid Pf Inj) (1/23/18 12:45)


Ondansetron Inj (Zofran Inj) (1/23/18 12:45)


Diphenhydramine Inj (Benadryl Inj) (1/23/18 12:45)


Ed Discharge Order (1/23/18 13:39)





Labs





Laboratory Tests








Test


  1/23/18


11:05


 


White Blood Count 11.0 TH/MM3 


 


Red Blood Count 2.95 MIL/MM3 


 


Hemoglobin 8.0 GM/DL 


 


Hematocrit 23.9 % 


 


Mean Corpuscular Volume 81.1 FL 


 


Mean Corpuscular Hemoglobin 27.3 PG 


 


Mean Corpuscular Hemoglobin


Concent 33.6 % 


 


 


Red Cell Distribution Width 15.5 % 


 


Platelet Count 280 TH/MM3 


 


Mean Platelet Volume 7.8 FL 


 


Neutrophils (%) (Auto) 67.6 % 


 


Lymphocytes (%) (Auto) 21.1 % 


 


Monocytes (%) (Auto) 7.0 % 


 


Eosinophils (%) (Auto) 3.7 % 


 


Basophils (%) (Auto) 0.6 % 


 


Neutrophils # (Auto) 7.5 TH/MM3 


 


Lymphocytes # (Auto) 2.3 TH/MM3 


 


Monocytes # (Auto) 0.8 TH/MM3 


 


Eosinophils # (Auto) 0.4 TH/MM3 


 


Basophils # (Auto) 0.1 TH/MM3 


 


CBC Comment AUTO DIFF 


 


Differential Total Cells


Counted 100 


 


 


Neutrophils % (Manual) 62 % 


 


Band Neutrophils % 1 % 


 


Lymphocytes % 27 % 


 


Monocytes % 5 % 


 


Eosinophils % 5 % 


 


Neutrophils # (Manual) 6.9 TH/MM3 


 


Nucleated Red Blood Cells 2 /100 WBC 


 


Differential Comment


  FINAL DIFF


MANUAL


 


Platelet Estimate NORMAL 


 


Platelet Morphology Comment NORMAL 


 


Polychromasia 2.0 % 


 


Sickle Cells 1+ 


 


Target Cells 1+ 


 


Reticulocyte Count 8.6 % 


 


Absolute Reticulocyte Count 253.1 MIL/L 


 


Blood Urea Nitrogen 10 MG/DL 


 


Creatinine 0.58 MG/DL 


 


Random Glucose 90 MG/DL 


 


Calcium Level 9.1 MG/DL 


 


Sodium Level 141 MEQ/L 


 


Potassium Level 3.9 MEQ/L 


 


Chloride Level 108 MEQ/L 


 


Carbon Dioxide Level 25.2 MEQ/L 


 


Anion Gap 8 MEQ/L 


 


Estimat Glomerular Filtration


Rate 209 ML/MIN 


 


 


C-Reactive Protein 0.87 MG/DL 











MDM


Medical Decision Making


Medical Screen Exam Complete:  Yes


Emergency Medical Condition:  Yes


Differential Diagnosis


sickle cell crisis, anemia, pe, pna, septic arthritis, avn


Narrative Course


CBC & BMP Diagram


1/23/18 11:05








Calcium Level 9.1





The patient received his typical dose of pain medication which is 2 mg IV 

Dilaudid, 25 mg IV Benadryl and 4 mg IV Zofran.  After that dose which is far 

more aggressive than any other patient typically receives the patient required 

yet another dose and upon reassessment the patient identified bedbugs in his 

blanket.  He captured a video of the blanket.  





The patient reports his primary care provider Dr. Mendoza, is out of town and 

that he cannot obtain his pain medication refill.  Dr Mendoza's office 

confirmed he was out of town and a refill for pt's chronic pain meds was 

provided. Unfortunately the patient's presentation is concerning for aberrant 

opioid conduct given his presence here in the absence of home opioids however 

in keeping him here for continuation of IV Dilaudid is a suboptimal solution as 

well.





Diagnosis





 Primary Impression:  


 Sickle cell pain crisis


 Additional Impression:  


 Bedbug bite


 Qualified Codes:  W57.XXXA - Bitten or stung by nonvenomous insect and other 

nonvenomous arthropods, initial encounter


***Med/Other Pt SpecificInfo:  Prescription(s) given


Scripts


Oxycodone-Acetaminophen (Percocet) 7.5-325 mg Tab


1 TAB PO Q6H Y for PAIN SCALE 6 TO 10, #18 TAB 0 Refills


   Prov: Thanh Matthew MD         1/23/18 


Folic Acid (Folic Acid) 1 Mg Tablet


1 MG PO DAILY, #30 TAB


   Prov: Thanh Matthew MD         1/23/18 


Hydroxyurea (Hydrea) 500 Mg Cap


500 MG PO DAILY, #30 CAP


   Prov: Thanh Matthew MD         1/23/18


Disposition:  01 DISCHARGE HOME


Condition:  Stable











Thanh Matthew MD Jan 23, 2018 12:25

## 2018-01-23 NOTE — RADRPT
EXAM DATE/TIME:  01/23/2018 11:43 

 

[facility] COMPARISON:     

No previous studies available for comparison.

 

                     

INDICATIONS :     

Sickle cell crisis, pain in joints, short of breath, general weakness.

                     

 

MEDICAL HISTORY :     

Sickle Cell disease.          

 

SURGICAL HISTORY :     

None.   

 

ENCOUNTER:     

Initial                                        

 

ACUITY:     

1 day      

 

PAIN SCORE:     

9/10

 

LOCATION:     

Bilateral chest 

 

FINDINGS:     

A single view of the chest demonstrates the lungs to be symmetrically aerated without evidence of mas
s, infiltrate or effusion.  The cardiomediastinal contours are prominent. Osseous structures are inta
ct.

 

CONCLUSION:     

1. Cardiomegaly. No acute findings.

 

 

 

 Matteo Abdul MD on January 23, 2018 at 11:57           

Board Certified Radiologist.

 This report was verified electronically.

## 2018-02-14 ENCOUNTER — HOSPITAL ENCOUNTER (EMERGENCY)
Dept: HOSPITAL 17 - NEPE | Age: 23
Discharge: HOME | End: 2018-02-14
Payer: COMMERCIAL

## 2018-02-14 VITALS — OXYGEN SATURATION: 97 %

## 2018-02-14 VITALS
TEMPERATURE: 98.4 F | SYSTOLIC BLOOD PRESSURE: 123 MMHG | DIASTOLIC BLOOD PRESSURE: 67 MMHG | RESPIRATION RATE: 16 BRPM | HEART RATE: 88 BPM | OXYGEN SATURATION: 99 %

## 2018-02-14 VITALS
OXYGEN SATURATION: 97 % | HEART RATE: 81 BPM | DIASTOLIC BLOOD PRESSURE: 82 MMHG | RESPIRATION RATE: 18 BRPM | SYSTOLIC BLOOD PRESSURE: 126 MMHG

## 2018-02-14 VITALS — BODY MASS INDEX: 21.74 KG/M2 | HEIGHT: 66 IN | WEIGHT: 135.28 LBS

## 2018-02-14 DIAGNOSIS — D57.00: Primary | ICD-10-CM

## 2018-02-14 DIAGNOSIS — M54.9: ICD-10-CM

## 2018-02-14 DIAGNOSIS — Z88.5: ICD-10-CM

## 2018-02-14 DIAGNOSIS — Z88.2: ICD-10-CM

## 2018-02-14 DIAGNOSIS — Z79.899: ICD-10-CM

## 2018-02-14 DIAGNOSIS — R07.9: ICD-10-CM

## 2018-02-14 LAB
ACANTHOCYTES BLD QL SMEAR: (no result)
BASOPHILS # BLD AUTO: 0 TH/MM3 (ref 0–0.2)
BASOPHILS NFR BLD: 0.2 % (ref 0–2)
BUN SERPL-MCNC: 8 MG/DL (ref 7–18)
CALCIUM SERPL-MCNC: 9.2 MG/DL (ref 8.5–10.1)
CHLORIDE SERPL-SCNC: 107 MEQ/L (ref 98–107)
CREAT SERPL-MCNC: 0.53 MG/DL (ref 0.6–1.3)
EOSINOPHIL # BLD: 0.3 TH/MM3 (ref 0–0.4)
EOSINOPHIL NFR BLD: 1.7 % (ref 0–4)
ERYTHROCYTE [DISTWIDTH] IN BLOOD BY AUTOMATED COUNT: 15 % (ref 11.6–17.2)
GFR SERPLBLD BASED ON 1.73 SQ M-ARVRAT: 231 ML/MIN (ref 89–?)
GLUCOSE SERPL-MCNC: 111 MG/DL (ref 74–106)
HCO3 BLD-SCNC: 25.9 MEQ/L (ref 21–32)
HCT VFR BLD CALC: 24.8 % (ref 39–51)
HGB BLD-MCNC: 8 GM/DL (ref 13–17)
HGB S BLD QL SOLY: (no result)
LYMPHOCYTES # BLD AUTO: 2.3 TH/MM3 (ref 1–4.8)
LYMPHOCYTES NFR BLD AUTO: 12.3 % (ref 9–44)
MCH RBC QN AUTO: 25.8 PG (ref 27–34)
MCHC RBC AUTO-ENTMCNC: 32 % (ref 32–36)
MCV RBC AUTO: 80.6 FL (ref 80–100)
MONOCYTE #: 0.7 TH/MM3 (ref 0–0.9)
MONOCYTES NFR BLD: 3.7 % (ref 0–8)
NEUTROPHILS # BLD AUTO: 15.2 TH/MM3 (ref 1.8–7.7)
NEUTROPHILS NFR BLD AUTO: 82.1 % (ref 16–70)
PLATELET # BLD: 207 TH/MM3 (ref 150–450)
PMV BLD AUTO: 7.7 FL (ref 7–11)
POLYCHROMASIA BLD QL SMEAR: 3.4 % (ref 0–1.9)
RBC # BLD AUTO: 3.08 MIL/MM3 (ref 4.5–5.9)
RETIC #: 160.7 MIL/L (ref 20–150)
RETICS/RBC NFR: 5.2 % (ref 0.4–3)
SCHISTOCYTES BLD QL SMEAR: (no result)
SODIUM SERPL-SCNC: 140 MEQ/L (ref 136–145)
TARGETS BLD QL SMEAR: (no result)
WBC # BLD AUTO: 18.5 TH/MM3 (ref 4–11)

## 2018-02-14 PROCEDURE — 71045 X-RAY EXAM CHEST 1 VIEW: CPT

## 2018-02-14 PROCEDURE — 85044 MANUAL RETICULOCYTE COUNT: CPT

## 2018-02-14 PROCEDURE — 96375 TX/PRO/DX INJ NEW DRUG ADDON: CPT

## 2018-02-14 PROCEDURE — 80048 BASIC METABOLIC PNL TOTAL CA: CPT

## 2018-02-14 PROCEDURE — 99284 EMERGENCY DEPT VISIT MOD MDM: CPT

## 2018-02-14 PROCEDURE — 96374 THER/PROPH/DIAG INJ IV PUSH: CPT

## 2018-02-14 PROCEDURE — 87081 CULTURE SCREEN ONLY: CPT

## 2018-02-14 PROCEDURE — 85025 COMPLETE CBC W/AUTO DIFF WBC: CPT

## 2018-02-14 PROCEDURE — 87804 INFLUENZA ASSAY W/OPTIC: CPT

## 2018-02-14 PROCEDURE — 96361 HYDRATE IV INFUSION ADD-ON: CPT

## 2018-02-14 PROCEDURE — 87880 STREP A ASSAY W/OPTIC: CPT

## 2018-02-14 NOTE — RADRPT
EXAM DATE/TIME:  02/14/2018 13:02 

 

[facility] COMPARISON:     

CHEST SINGLE AP, January 23, 2018, 11:43.

 

                     

INDICATIONS :     

Chest pain; sickle cell. 

                     

 

MEDICAL HISTORY :     

Sickle Cell disease.          

 

SURGICAL HISTORY :     

None.   

 

ENCOUNTER:     

Initial                                        

 

ACUITY:     

1 day      

 

PAIN SCORE:     

6/10

 

LOCATION:     

Bilateral chest 

 

FINDINGS:     

A single view of the chest demonstrates the lungs to be symmetrically aerated without evidence of mas
s, infiltrate or effusion.  The cardiomediastinal contours are unremarkable.  Osseous structures are 
intact.

 

CONCLUSION:     

No acute cardiopulmonary process.

 

 

 

 Mango Troncoso MD on February 14, 2018 at 13:38           

Board Certified Radiologist.

 This report was verified electronically.

## 2018-02-14 NOTE — PD
HPI


Chief Complaint:  Sickle Cell


Time Seen by Provider:  12:45


Travel History


International Travel<30 days:  No


Contact w/Intl Traveler<30days:  No


Traveled to known affect area:  No





History of Present Illness


HPI


22-year-old male with history of sickle cell disease presents emergency 

department for evaluation.  Patient states he has had a sore throat 1 week 

with body aches, unknown fever, chills.  He has had a cough now for 4-5 days 

with a green sputum.  He has developed a chest and back pain.  He believes that 

he is in sickle cell crisis.  Denies any shortness of breath.  Denies any nausea

, vomiting, or diarrhea.  He has no other symptoms to report.





PFSH


Past Medical History


Hx Anticoagulant Therapy:  No


Anemia:  Yes (SICKLE CELL)


Arthritis:  No


Asthma:  No


Autoimmune Disease:  No


Blood Disorders:  Yes ( G6PD DEFICIENCY)


Anxiety:  No


Depression:  No


Heart Rhythm Problems:  No


Cancer:  No


Cardiovascular Problems:  Yes (heart murmur)


High Cholesterol:  No


Chemotherapy:  No


Chest Pain:  Yes


Congestive Heart Failure:  No


COPD:  No


Cerebrovascular Accident:  No


Diabetes:  No


Diminished Hearing:  No


Endocrine:  No


Gastrointestinal Disorders:  Yes (gall stones)


Genetic Disorder:  Yes (SICKLE CELL, G6PD DEFICIENCY)


GERD:  No


Genitourinary:  No


Headaches:  No


Hiatal Hernia:  No


Heparin Induced Thrombocytopen:  No


Immune Disorder:  Yes (SICKLE CELL)


Implanted Vascular Access Dvce:  No


Kidney Stones:  No


Musculoskeletal:  No


Neurologic:  No


Psychiatric:  Yes


Reproductive:  No


Respiratory:  No


Immunizations Current:  Yes


Migraines:  No


Pneumonia:  Yes


Radiation Therapy:  No


Renal Failure:  No


Seizures:  No


Sickle Cell Disease:  Yes


Sleep Apnea:  No


Thyroid Disease:  No


Ulcer:  No





Past Surgical History


Abdominal Surgery:  No


AICD:  No


Arteriovenous Shunt:  No


Cardiac Surgery:  No


Ear Surgery:  No


Endocrine Surgery:  No


Eye Surgery:  No


Genitourinary Surgery:  No


Gynecologic Surgery:  No


Hysterectomy:  No


Insulin Pump:  No


Joint Replacement:  No


Neurologic Surgery:  No


Oral Surgery:  Yes (dental surgery "AS A SMALL CHILD")


Pacemaker:  No


Thoracic Surgery:  No


Other Surgery:  Yes (PICC LINE /picc line removal)





Social History


Alcohol Use:  No


Tobacco Use:  No


Substance Use:  No





Allergies-Medications


(Allergen,Severity, Reaction):  


Coded Allergies:  


     Sulfa (Sulfonamide Antibiotics) (Unverified  Allergy, Intermediate, itching

, 1/23/18)


     morphine (Unverified  Adverse Reaction, Mild, itching, 1/23/18)


 states it doesn't work and causes him to itch


Uncoded Allergies:  


     LEONARD BEANS (Allergy, Intermediate, 10/10/14)


Reported Meds & Prescriptions





Reported Meds & Active Scripts


Active


Folic Acid 1 Mg Tablet 1 Mg PO DAILY


Hydrea (Hydroxyurea) 500 Mg Cap 500 Mg PO DAILY


Reported


Exjade (Deferasirox) 125 Mg Tab   


     Do not chew/swallow tablets whole. Completely disperse tablets in


     water, orange or apple juice (use 3.5 ounces for total doses <1 g; 7


     ounces for doses =1 g).








Review of Systems


Except as stated in HPI:  all other systems reviewed are Neg





Physical Exam


Narrative


GENERAL: Well-nourished male patient, lying in bed in no acute distress.


SKIN: Focused skin assessment warm/dry.


HEAD: Atraumatic. Normocephalic. 


EYES: Pupils equal and round. No scleral icterus. No injection or drainage. 


ENT: No nasal bleeding or discharge.  Mucous membranes pink and moist.


NECK: Trachea midline. No JVD. 


CARDIOVASCULAR: Regular rate and rhythm.  No murmur appreciated.


RESPIRATORY: No accessory muscle use. Clear to auscultation. Breath sounds 

equal bilaterally. 


GASTROINTESTINAL: Abdomen soft, non-tender, nondistended. Hepatic and splenic 

margins not palpable. 


MUSCULOSKELETAL: No obvious deformities. No clubbing.  No cyanosis.  No edema. 


NEUROLOGICAL: Awake and alert. No obvious cranial nerve deficits.  Motor 

grossly within normal limits. Normal speech.


PSYCHIATRIC: Appropriate mood and affect; insight and judgment normal.





Data


Data


Last Documented VS





Vital Signs








  Date Time  Temp Pulse Resp B/P (MAP) Pulse Ox O2 Delivery O2 Flow Rate FiO2


 


2/14/18 14:51  81 18 126/82 (97) 97 Room Air  


 


2/14/18 12:38 98.4       








Orders





 Orders


Basic Metabolic Panel (Bmp) (2/14/18 12:53)


Complete Blood Count With Diff (2/14/18 12:53)


Retic Count (2/14/18 12:53)


Chest, Single Ap (2/14/18 12:53)


Ecg Monitoring (2/14/18 12:53)


Iv Access Insert/Monitor (2/14/18 12:53)


Oximetry (2/14/18 12:53)


Sodium Chloride 0.9% Flush (Ns Flush) (2/14/18 13:00)


Sodium Chlor 0.9% 1000 Ml Inj (Ns 1000 M (2/14/18 12:53)


Influenzae A/B Antigen (2/14/18 12:53)


Group A Rapid Strep Screen (2/14/18 12:53)


Lidocaine 2% Inj (Xylocaine 2% Inj) (2/14/18 13:00)


Vascular Access Team Consult/P PRN (2/14/18 13:22)


Vascular Poc Ultrasound (2/14/18 )


Strep Culture (Group A) (2/14/18 13:05)


Oxycodone-Acetamin  Mg (Percocet 1 (2/14/18 14:00)


Hydromorphone Pf Inj (Dilaudid Pf Inj) (2/14/18 15:45)


Ed Discharge Order (2/14/18 15:57)


Sodium Chlor 0.9% 1000 Ml Inj (Ns 1000 M (2/14/18 16:00)





Labs





Laboratory Tests








Test


  2/14/18


13:05


 


White Blood Count 18.5 TH/MM3 


 


Red Blood Count 3.08 MIL/MM3 


 


Hemoglobin 8.0 GM/DL 


 


Hematocrit 24.8 % 


 


Mean Corpuscular Volume 80.6 FL 


 


Mean Corpuscular Hemoglobin 25.8 PG 


 


Mean Corpuscular Hemoglobin


Concent 32.0 % 


 


 


Red Cell Distribution Width 15.0 % 


 


Platelet Count 207 TH/MM3 


 


Mean Platelet Volume 7.7 FL 


 


Neutrophils (%) (Auto) 82.1 % 


 


Lymphocytes (%) (Auto) 12.3 % 


 


Monocytes (%) (Auto) 3.7 % 


 


Eosinophils (%) (Auto) 1.7 % 


 


Basophils (%) (Auto) 0.2 % 


 


Neutrophils # (Auto) 15.2 TH/MM3 


 


Lymphocytes # (Auto) 2.3 TH/MM3 


 


Monocytes # (Auto) 0.7 TH/MM3 


 


Eosinophils # (Auto) 0.3 TH/MM3 


 


Basophils # (Auto) 0.0 TH/MM3 


 


CBC Comment AUTO DIFF 


 


Differential Comment


  AUTO DIFF


CONFIRMED


 


Polychromasia 3.4 % 


 


Sickle Cells 1+ 


 


Target Cells 1+ 


 


Acanthocytes OCC 


 


Keratocytes OCC 


 


Reticulocyte Count 5.2 % 


 


Absolute Reticulocyte Count 160.7 MIL/L 


 


Blood Urea Nitrogen 8 MG/DL 


 


Creatinine 0.53 MG/DL 


 


Random Glucose 111 MG/DL 


 


Calcium Level 9.2 MG/DL 


 


Sodium Level 140 MEQ/L 


 


Potassium Level 4.0 MEQ/L 


 


Chloride Level 107 MEQ/L 


 


Carbon Dioxide Level 25.9 MEQ/L 


 


Anion Gap 7 MEQ/L 


 


Estimat Glomerular Filtration


Rate 231 ML/MIN 


 











[facility]


Medical Decision Making


Medical Screen Exam Complete:  Yes


Emergency Medical Condition:  Yes


Medical Record Reviewed:  Yes


Differential Diagnosis


Sickle cell anemia versus sickle cell crisis versus pneumonia versus influenza 

versus pharyngitis


Narrative Course


22-year-old male presents to emergency department for evaluation.  He believes 

he is in sickle cell crisis.  Workup is initiated.  Patient is treated with by 

mouth percent as we have been unable to establish IV access.  IV access team is 

consulted. 





Laboratory Tests








Test


  2/14/18


13:05


 


White Blood Count 18.5 TH/MM3 


 


Red Blood Count 3.08 MIL/MM3 


 


Hemoglobin 8.0 GM/DL 


 


Hematocrit 24.8 % 


 


Mean Corpuscular Volume 80.6 FL 


 


Mean Corpuscular Hemoglobin 25.8 PG 


 


Mean Corpuscular Hemoglobin


Concent 32.0 % 


 


 


Red Cell Distribution Width 15.0 % 


 


Platelet Count 207 TH/MM3 


 


Mean Platelet Volume 7.7 FL 


 


Neutrophils (%) (Auto) 82.1 % 


 


Lymphocytes (%) (Auto) 12.3 % 


 


Monocytes (%) (Auto) 3.7 % 


 


Eosinophils (%) (Auto) 1.7 % 


 


Basophils (%) (Auto) 0.2 % 


 


Neutrophils # (Auto) 15.2 TH/MM3 


 


Lymphocytes # (Auto) 2.3 TH/MM3 


 


Monocytes # (Auto) 0.7 TH/MM3 


 


Eosinophils # (Auto) 0.3 TH/MM3 


 


Basophils # (Auto) 0.0 TH/MM3 


 


CBC Comment AUTO DIFF 


 


Differential Comment


  AUTO DIFF


CONFIRMED


 


Polychromasia 3.4 % 


 


Sickle Cells 1+ 


 


Target Cells 1+ 


 


Acanthocytes OCC 


 


Keratocytes OCC 


 


Reticulocyte Count 5.2 % 


 


Absolute Reticulocyte Count 160.7 MIL/L 


 


Blood Urea Nitrogen 8 MG/DL 


 


Creatinine 0.53 MG/DL 


 


Random Glucose 111 MG/DL 


 


Calcium Level 9.2 MG/DL 


 


Sodium Level 140 MEQ/L 


 


Potassium Level 4.0 MEQ/L 


 


Chloride Level 107 MEQ/L 


 


Carbon Dioxide Level 25.9 MEQ/L 


 


Anion Gap 7 MEQ/L 


 


Estimat Glomerular Filtration


Rate 231 ML/MIN 


 








Lab work is reviewed by myself and my attending physician. IV access has been 

obtained; Patient will be given IV lidocaine for pain control.





Prior to administration, the patient requests to speak with me in regards to 

lidocaine for pain control. I go and speak with the patient. I explained to him 

that we are trying to move away from opiate pain control as our first line of 

treatment. I explained to him that there has been vast research on the use of 

lidocaine and pain control.  He verbalized understanding, but questioned if we 

know dilaudid works for his pain control, then why can't I give that to him.  I 

explained that dilaudid is still an option, but as an organization we are 

trying to move away from opiates as first line treatment of pain.  I mentioned 

the national opiate crisis and that we are working on alternative treatments 

for pain. I did remind the patient that he received a percocet 10 and that he 

does not have to take the lidocaine if he would rather not, but I will try 

something else than dilaudid first regardless. At this time, his mother who was 

on speaker phone began yelling at me, demanding I give him the dilaudid. I 

explained to her once again - as she had listened to my entire conversation 

with her son- that as an organization we are getting away from any opiate as 

first line pain control.  I apologized for her frustrations, but re-iterated if 

the lidocaine  does work for her son, then he will have an alternative option 

for his pain control.  She was not satisfied with this answer.  She continued 

to yell and attempt to argue. I said once again to the patient he did not have 

to take the medication. If he would like to try something else first, I am 

willing to do this, but it will not be dilaudid. I have assured him that if it 

does not work, we will give him additional pain control.





Upon reassessment, pt reports continued pain. He will be given 1mg Dilaudid IV.





Upon reassessment, patient's pain has decreased but not completely resolved; he 

wants additional dilaudid and is not willing to try any other medication.  I 

have discussed with my attending physician. We will not be giving him any 

additional opiates.  He'll be discharged home at this time.





Pt becomes angry and yelling at staff; female at bedside is also yelling at 

staff.  They want him to have dilaudid and no other med. He has had one dose of 

dilaudid. He is not currently in sickle cell crisis. He does not want any other 

non-opiate. He is advised to follow up with his hematologist and will be 

discharged at this time.  Security is called





Diagnosis





 Primary Impression:  


 Sickle cell anemia


 Qualified Codes:  D57.1 - Sickle-cell disease without crisis


Referrals:  


Deanna Glover MD





Primary Care Physician


Patient Instructions:  General Instructions, Sickle Cell Disease (GEN)





***Additional Instructions:  


Drink plenty of fluids


Follow-up with her primary care provider


Return immediately with any acute worsening of symptoms


Disposition:  01 DISCHARGE HOME


Condition:  Stable











Alla Motley Feb 14, 2018 12:47

## 2018-02-14 NOTE — PD
Physical Exam


Date Seen by Provider:  Feb 14, 2018


Narrative


Patient presents with a chief complaint of sickle cell crisis.





Data


Data


Last Documented VS





Vital Signs








  Date Time  Temp Pulse Resp B/P (MAP) Pulse Ox O2 Delivery O2 Flow Rate FiO2


 


2/14/18 12:38 98.4 88 16 123/67 (85) 99   








Orders





 Orders


Basic Metabolic Panel (Bmp) (2/14/18 12:53)


Complete Blood Count With Diff (2/14/18 12:53)


Retic Count (2/14/18 12:53)


Chest, Single Ap (2/14/18 12:53)


Ecg Monitoring (2/14/18 12:53)


Iv Access Insert/Monitor (2/14/18 12:53)


Oximetry (2/14/18 12:53)


Sodium Chloride 0.9% Flush (Ns Flush) (2/14/18 13:00)


Sodium Chlor 0.9% 1000 Ml Inj (Ns 1000 M (2/14/18 12:53)


Influenzae A/B Antigen (2/14/18 12:53)


Group A Rapid Strep Screen (2/14/18 12:53)


Lidocaine 2% Inj (Xylocaine 2% Inj) (2/14/18 13:00)





Labs





Laboratory Tests








Test


  2/14/18


13:05











MDM


Supervised Visit with EDA:  Yes


Narrative Course


I, Dr. Merrill, have reviewed the advance practice practitioner's documentation 

and am in agreement, met with the patient face to face, made the diagnosis, and 

the medical decision making was done by me.  





*My assessment and Findings: Awake and alert and in no acute distress.





I have discussed the use of IV lidocaine with Alla Guajardo NP.  We will try it 

with this patient.





Please see Alla Motley NP's  note for results of laboratory and radiographic 

evaluation, ED course, final diagnosis and disposition











Shannen Merrill MD Feb 14, 2018 13:22

## 2018-02-18 ENCOUNTER — HOSPITAL ENCOUNTER (EMERGENCY)
Dept: HOSPITAL 17 - NEPK | Age: 23
Discharge: HOME | End: 2018-02-18
Payer: COMMERCIAL

## 2018-02-18 VITALS — WEIGHT: 135.58 LBS | BODY MASS INDEX: 21.28 KG/M2 | HEIGHT: 67 IN

## 2018-02-18 VITALS
HEART RATE: 82 BPM | DIASTOLIC BLOOD PRESSURE: 64 MMHG | SYSTOLIC BLOOD PRESSURE: 121 MMHG | RESPIRATION RATE: 14 BRPM | OXYGEN SATURATION: 100 % | TEMPERATURE: 98.2 F

## 2018-02-18 DIAGNOSIS — J01.40: Primary | ICD-10-CM

## 2018-02-18 DIAGNOSIS — J03.90: ICD-10-CM

## 2018-02-18 PROCEDURE — 87081 CULTURE SCREEN ONLY: CPT

## 2018-02-18 PROCEDURE — 87880 STREP A ASSAY W/OPTIC: CPT

## 2018-02-18 PROCEDURE — 99284 EMERGENCY DEPT VISIT MOD MDM: CPT

## 2018-02-18 PROCEDURE — 87804 INFLUENZA ASSAY W/OPTIC: CPT

## 2018-02-18 PROCEDURE — 71045 X-RAY EXAM CHEST 1 VIEW: CPT

## 2018-02-18 NOTE — RADRPT
EXAM DATE/TIME:  02/18/2018 11:00 

 

[facility] COMPARISON:     

CHEST SINGLE AP, February 14, 2018, 13:02.

 

                     

INDICATIONS :     

Sore throat- Flu like symptoms.

                     

 

MEDICAL HISTORY :     

None.          

 

SURGICAL HISTORY :     

None.   

 

ENCOUNTER:     

Initial                                        

 

ACUITY:     

2 weeks      

 

PAIN SCORE:     

5/10

 

LOCATION:     

Bilateral chest 

 

FINDINGS:     

A single view of the chest demonstrates the lungs to be symmetrically aerated without evidence of mas
s, infiltrate or effusion.  The cardiomediastinal contours are unremarkable.  Osseous structures are 
intact.

 

CONCLUSION:     The lungs are clear.

 

 

 

 Myles Beverly MD on February 18, 2018 at 11:16           

Board Certified Radiologist.

 This report was verified electronically.

## 2018-02-18 NOTE — PD
HPI


Chief Complaint:  Cold / Flu Symptoms


Time Seen by Provider:  10:26


Travel History


International Travel<30 days:  No


Contact w/Intl Traveler<30days:  No


Traveled to known affect area:  No





History of Present Illness


HPI


22-year-old -American male presents emergency department with 2 week 

history of ongoing upper respiratory symptoms including headache, postnasal drip

, cough, sore throat, productive cough and chest congestion.  He denies any 

specific fever.  He has no nausea, vomiting, or diarrhea.  He denies 

significant wheezing.  No history of needing bronchitis inhaler in the past.  

Patient states his cough is much worse in the morning with copious amounts of 

productive sputum.  Patient has history of sickle cell anemia, and is allergic 

to Kristie Beans, and sulfa, and morphine.





PFSH


Past Medical History


Hx Anticoagulant Therapy:  No


Anemia:  Yes (SICKLE CELL)


Arthritis:  No


Asthma:  No


Autoimmune Disease:  No


Blood Disorders:  Yes ( G6PD DEFICIENCY)


Anxiety:  No


Depression:  No


Heart Rhythm Problems:  No


Cancer:  No


Cardiovascular Problems:  No


High Cholesterol:  No


Chemotherapy:  No


Chest Pain:  Yes


Congestive Heart Failure:  No


COPD:  No


Cerebrovascular Accident:  No


Diabetes:  No


Diminished Hearing:  No


Endocrine:  No


Gastrointestinal Disorders:  Yes (gall stones)


Genetic Disorder:  Yes (SICKLE CELL, G6PD DEFICIENCY)


GERD:  No


Genitourinary:  No


Headaches:  No


Hiatal Hernia:  No


Heparin Induced Thrombocytopen:  No


Immune Disorder:  Yes (SICKLE CELL)


Implanted Vascular Access Dvce:  No


Kidney Stones:  No


Musculoskeletal:  No


Neurologic:  No


Psychiatric:  Yes


Reproductive:  No


Respiratory:  No (sickle cell anemia)


Immunizations Current:  Yes


Migraines:  No


Pneumonia:  Yes


Radiation Therapy:  No


Renal Failure:  No


Seizures:  No


Sickle Cell Disease:  Yes


Sleep Apnea:  No


Thyroid Disease:  No


Ulcer:  No





Past Surgical History


Abdominal Surgery:  No


AICD:  No


Arteriovenous Shunt:  No


Cardiac Surgery:  No


Ear Surgery:  No


Endocrine Surgery:  No


Eye Surgery:  No


Genitourinary Surgery:  No


Gynecologic Surgery:  No


Hysterectomy:  No


Insulin Pump:  No


Joint Replacement:  No


Neurologic Surgery:  No


Oral Surgery:  Yes (dental surgery "AS A SMALL CHILD")


Pacemaker:  No


Thoracic Surgery:  No


Other Surgery:  Yes (PICC LINE /picc line removal)





Social History


Alcohol Use:  No


Tobacco Use:  No


Substance Use:  No





Allergies-Medications


(Allergen,Severity, Reaction):  


Coded Allergies:  


     Sulfa (Sulfonamide Antibiotics) (Unverified  Allergy, Intermediate, itching

, 1/23/18)


     morphine (Unverified  Adverse Reaction, Mild, itching, 1/23/18)


 states it doesn't work and causes him to itch


Uncoded Allergies:  


     KRISTIE BEANS (Allergy, Intermediate, 10/10/14)


Reported Meds & Prescriptions





Reported Meds & Active Scripts


Active


Folic Acid 1 Mg Tablet 1 Mg PO DAILY


Hydrea (Hydroxyurea) 500 Mg Cap 500 Mg PO DAILY


Reported


Exjade (Deferasirox) 125 Mg Tab   


     Do not chew/swallow tablets whole. Completely disperse tablets in


     water, orange or apple juice (use 3.5 ounces for total doses <1 g; 7


     ounces for doses =1 g).








Review of Systems


Except as stated in HPI:  all other systems reviewed are Neg


General / Constitutional:  Positive: Chills, No: Fever


Eyes:  No: Visual changes


HENT:  Positive: Headaches, Sore Throat, Rhinitis, Rhinorrhea, Congestion, No: 

Vertigo, Lightheadedness, Nosebleed, Neck Stiffness, Neck Pain, Dental 

Difficulties, Earache


Cardiovascular:  No: Chest Pain or Discomfort


Respiratory:  Positive: Cough, No: Shortness of Breath, Wheezing, Sneezing


Gastrointestinal:  No: Nausea, Vomiting, Diarrhea, Abdominal Pain


Genitourinary:  No: Dysuria


Musculoskeletal:  No: Pain


Skin:  No Rash


Neurologic:  No: Weakness


Psychiatric:  No: Depression


Endocrine:  No: Polydipsia


Hematologic/Lymphatic:  No: Easy Bruising





Physical Exam


Narrative


GENERAL: Patient appears in no acute distress.


SKIN: Warm and dry.  Normal color.  Normal turgor.  No rash


HEAD: Atraumatic. Normocephalic. 


EYES: Pupils equal and round. No scleral icterus. No injection or drainage. 


ENT: No nasal bleeding or discharge.  Mucous membranes pink and moist.  TMs are 

somewhat dull bilaterally with no injection.  Patient is moderate sinus 

tenderness to palpation and percussion bilaterally both frontal maxillary 

sinuses.  Posterior pharynx is erythematous with mild tonsillitis with exudate 

more on the left than the right but no obvious signs of abscess.  There is 

cobblestoning and postnasal drip noted in the posterior pharynx as well per


NECK: Trachea midline.  Supple with mild lymphadenopathy more on the left than 

the right in the anterior cervical chains


CARDIOVASCULAR: Regular rate and rhythm.  No murmurs gallops or rubs


RESPIRATORY: No accessory muscle use. Clear to auscultation. Breath sounds 

equal bilaterally. 


GASTROINTESTINAL: Abdomen soft, non-tender, nondistended. Hepatic and splenic 

margins not palpable. 


MUSCULOSKELETAL: Extremities without clubbing, cyanosis, or edema. No obvious 

deformities. 


NEUROLOGICAL: Awake and alert. No obvious cranial nerve deficits.  Motor 

grossly within normal limits. Five out of 5 muscle strength in the arms and 

legs.  Normal speech.


PSYCHIATRIC: Appropriate mood and affect; insight and judgment normal.





Data


Data


Last Documented VS





Vital Signs








  Date Time  Temp Pulse Resp B/P (MAP) Pulse Ox O2 Delivery O2 Flow Rate FiO2


 


2/18/18 10:37   20   Room Air  


 


2/18/18 10:05 98.2 82  121/64 (83) 100   








Orders





 Orders


Chest, Single Ap (2/18/18 10:26)


Influenzae A/B Antigen (2/18/18 10:26)


Group A Rapid Strep Screen (2/18/18 10:26)


Strep Culture (Group A) (2/18/18 10:30)








MDM


Medical Decision Making


Medical Screen Exam Complete:  Yes


Emergency Medical Condition:  Yes


Differential Diagnosis


Sinusitis.  Bronchitis.  Influenza.  Strep pharyngitis.  Tonsillitis.  

Tonsillar abscess.  Pneumonia.


Narrative Course


Rapid influenza and rapid strep test to run.


Chest x-ray was ordered.


Rapid influenza and rapid strep were both negative.


Chest x-ray is negative for acute process per radiologist


Patient was treated for sinusitis/tonsillitis with amoxicillin 875 mg twice 

daily 10 days per


Patient is also given Flonase nasal spray 2 sprays each nostril daily.


Patient with a cough medicine of choice as needed.


Patient is to follow-up with his primary care physician if symptoms do not 

improve.





Diagnosis





 Primary Impression:  


 Acute pansinusitis, unspecified


 Qualified Codes:  J01.40 - Acute pansinusitis, unspecified


 Additional Impression:  


 Tonsillitis


Patient Instructions:  General Instructions, Sinusitis (ED), Tonsillitis (DC)





***Additional Instructions:  


Rapid influenza and rapid strep were both negative.


Chest x-ray is negative for acute process per radiologist


Patient was treated for sinusitis/tonsillitis with amoxicillin 875 mg twice 

daily 10 days per


Patient is also given Flonase nasal spray 2 sprays each nostril daily.


Patient with a cough medicine of choice as needed.


Patient is to follow-up with his primary care physician if symptoms do not 

improve.


***Med/Other Pt SpecificInfo:  Prescription(s) given


Scripts


Fluticasone Nasal Spray (Flonase Nasal Spray) 50 Mcg/Act Spray


100 MCG EACH NARE BID for Allergies, #1 BOTTLE 0 Refills


   Prov: Valdemar Pat MD         2/18/18 


Amoxicillin (Amoxicillin) 875 Mg Tab


875 MG PO BID for Infection for 10 Days, #20 TAB 0 Refills


   Prov: Valdemar Pat MD         2/18/18


Disposition:  01 DISCHARGE HOME


Condition:  Stable











Lyndon Shculte Feb 18, 2018 11:19

## 2018-03-19 ENCOUNTER — HOSPITAL ENCOUNTER (EMERGENCY)
Dept: HOSPITAL 17 - NEPC | Age: 23
Discharge: HOME | End: 2018-03-19
Payer: COMMERCIAL

## 2018-03-19 VITALS
OXYGEN SATURATION: 99 % | HEART RATE: 82 BPM | DIASTOLIC BLOOD PRESSURE: 61 MMHG | SYSTOLIC BLOOD PRESSURE: 124 MMHG | RESPIRATION RATE: 19 BRPM

## 2018-03-19 VITALS
RESPIRATION RATE: 16 BRPM | TEMPERATURE: 98.3 F | DIASTOLIC BLOOD PRESSURE: 57 MMHG | OXYGEN SATURATION: 98 % | HEART RATE: 71 BPM | SYSTOLIC BLOOD PRESSURE: 107 MMHG

## 2018-03-19 VITALS — BODY MASS INDEX: 21.26 KG/M2 | HEIGHT: 66 IN | WEIGHT: 132.28 LBS

## 2018-03-19 VITALS — SYSTOLIC BLOOD PRESSURE: 124 MMHG | DIASTOLIC BLOOD PRESSURE: 61 MMHG

## 2018-03-19 DIAGNOSIS — Z88.5: ICD-10-CM

## 2018-03-19 DIAGNOSIS — Z79.899: ICD-10-CM

## 2018-03-19 DIAGNOSIS — Z88.2: ICD-10-CM

## 2018-03-19 DIAGNOSIS — D57.1: Primary | ICD-10-CM

## 2018-03-19 LAB
BASOPHILS # BLD AUTO: 0.1 TH/MM3 (ref 0–0.2)
BASOPHILS NFR BLD: 0.7 % (ref 0–2)
EOSINOPHIL # BLD: 0.5 TH/MM3 (ref 0–0.4)
EOSINOPHIL NFR BLD: 4 % (ref 0–4)
ERYTHROCYTE [DISTWIDTH] IN BLOOD BY AUTOMATED COUNT: 16.8 % (ref 11.6–17.2)
HCT VFR BLD CALC: 21.3 % (ref 39–51)
HGB BLD-MCNC: 7.1 GM/DL (ref 13–17)
HGB S BLD QL SOLY: (no result)
HOWELL-JOLLY BOD BLD QL SMEAR: PRESENT
LYMPHOCYTES # BLD AUTO: 3.4 TH/MM3 (ref 1–4.8)
LYMPHOCYTES NFR BLD AUTO: 26.2 % (ref 9–44)
LYMPHOCYTES: 25 % (ref 9–44)
MCH RBC QN AUTO: 26.3 PG (ref 27–34)
MCHC RBC AUTO-ENTMCNC: 33.4 % (ref 32–36)
MCV RBC AUTO: 79 FL (ref 80–100)
MONOCYTE #: 0.9 TH/MM3 (ref 0–0.9)
MONOCYTES NFR BLD: 7.1 % (ref 0–8)
MONOCYTES: 7 % (ref 0–8)
NEUTROPHILS # BLD AUTO: 8.1 TH/MM3 (ref 1.8–7.7)
NEUTROPHILS NFR BLD AUTO: 62 % (ref 16–70)
NEUTS BAND # BLD MANUAL: 8.6 TH/MM3 (ref 1.8–7.7)
NEUTS BAND NFR BLD: 3 % (ref 0–6)
NEUTS SEG NFR BLD MANUAL: 63 % (ref 16–70)
NUCLEATED RED BLOOD CELL: 5 (ref 0–0)
PLATELET # BLD: 237 TH/MM3 (ref 150–450)
PMV BLD AUTO: 7.6 FL (ref 7–11)
POLYCHROMASIA BLD QL SMEAR: 4.9 % (ref 0–1.9)
RBC # BLD AUTO: 2.7 MIL/MM3 (ref 4.5–5.9)
TARGETS BLD QL SMEAR: (no result)
WBC # BLD AUTO: 13.1 TH/MM3 (ref 4–11)
WBC NRBC COR # BLD: 5 /100 WBC (ref 0–0)

## 2018-03-19 PROCEDURE — 85027 COMPLETE CBC AUTOMATED: CPT

## 2018-03-19 PROCEDURE — 99284 EMERGENCY DEPT VISIT MOD MDM: CPT

## 2018-03-19 PROCEDURE — 96374 THER/PROPH/DIAG INJ IV PUSH: CPT

## 2018-03-19 PROCEDURE — 85007 BL SMEAR W/DIFF WBC COUNT: CPT

## 2018-03-19 PROCEDURE — 96375 TX/PRO/DX INJ NEW DRUG ADDON: CPT

## 2018-03-19 NOTE — PD
HPI


Chief Complaint:  Sickle Cell


Time Seen by Provider:  07:38


Travel History


International Travel<30 days:  No


Contact w/Intl Traveler<30days:  No


Traveled to known affect area:  No





History of Present Illness


HPI


This patient complains of sickle cell pain.  The pain is diffuse.  Duration is 

6 hours.  Severity is moderate.  No alleviating factors.  No exacerbating 

factors.  Denies fever or chest pain.  Reportedly follows with [facility] as 

outpatient.





[facility]


Past Medical History


Hx Anticoagulant Therapy:  No


Anemia:  Yes (SICKLE CELL)


Arthritis:  No


Asthma:  No


Autoimmune Disease:  No


Blood Disorders:  Yes ( G6PD DEFICIENCY)


Anxiety:  No


Depression:  No


Heart Rhythm Problems:  No


Cancer:  No


Cardiovascular Problems:  No


High Cholesterol:  No


Chemotherapy:  No


Chest Pain:  Yes


Congestive Heart Failure:  No


COPD:  No


Cerebrovascular Accident:  No


Diabetes:  No


Diminished Hearing:  No


Endocrine:  No


Gastrointestinal Disorders:  Yes (gall stones)


Genetic Disorder:  Yes (SICKLE CELL, G6PD DEFICIENCY)


GERD:  No


Genitourinary:  No


Headaches:  No


Hiatal Hernia:  No


Heparin Induced Thrombocytopen:  No


Immune Disorder:  Yes (SICKLE CELL)


Implanted Vascular Access Dvce:  No


Kidney Stones:  No


Musculoskeletal:  No


Neurologic:  No


Psychiatric:  Yes


Reproductive:  No


Immunizations Current:  Yes


Migraines:  No


Pneumonia:  Yes


Radiation Therapy:  No


Renal Failure:  No


Seizures:  No


Sickle Cell Disease:  Yes


Sleep Apnea:  No


Thyroid Disease:  No


Ulcer:  No


Tetanus Vaccination:  Unknown


Influenza Vaccination:  No





Past Surgical History


Abdominal Surgery:  No


AICD:  No


Arteriovenous Shunt:  No


Cardiac Surgery:  No


Ear Surgery:  No


Endocrine Surgery:  No


Eye Surgery:  No


Genitourinary Surgery:  No


Gynecologic Surgery:  No


Hysterectomy:  No


Insulin Pump:  No


Joint Replacement:  No


Neurologic Surgery:  No


Oral Surgery:  Yes (dental surgery "AS A SMALL CHILD")


Pacemaker:  No


Thoracic Surgery:  No


Other Surgery:  Yes (PICC LINE /picc line removal)





Social History


Alcohol Use:  No


Tobacco Use:  No


Substance Use:  No





Allergies-Medications


(Allergen,Severity, Reaction):  


Coded Allergies:  


     Sulfa (Sulfonamide Antibiotics) (Unverified  Allergy, Intermediate, itching

, 3/19/18)


     morphine (Unverified  Adverse Reaction, Mild, itching, 3/19/18)


 states it doesn't work and causes him to itch


Uncoded Allergies:  


     LEONARD BEANS (Allergy, Intermediate, 10/10/14)


Reported Meds & Prescriptions





Reported Meds & Active Scripts


Active


Flonase Nasal Spray (Fluticasone Nasal Spray) 50 Mcg/Act Spray 100 Mcg EACH 

NARE BID


Amoxicillin 875 Mg Tab 875 Mg PO BID 10 Days


Folic Acid 1 Mg Tablet 1 Mg PO DAILY


Hydrea (Hydroxyurea) 500 Mg Cap 500 Mg PO DAILY


Reported


Exjade (Deferasirox) 125 Mg Tab   


     Do not chew/swallow tablets whole. Completely disperse tablets in


     water, orange or apple juice (use 3.5 ounces for total doses <1 g; 7


     ounces for doses =1 g).








Review of Systems


General / Constitutional:  No: Fever


Eyes:  No: Visual changes


HENT:  No: Headaches


Cardiovascular:  No: Chest Pain or Discomfort


Respiratory:  No: Shortness of Breath


Gastrointestinal:  No: Abdominal Pain


Genitourinary:  No: Dysuria


Musculoskeletal:  Positive: Pain


Skin:  No Rash


Neurologic:  No: Weakness


Psychiatric:  No: Depression


Endocrine:  No: Polydipsia


Hematologic/Lymphatic:  No: Easy Bruising





Physical Exam


Narrative


GENERAL: Well-nourished, well-developed patient in no apparent distress.


SKIN: Focused skin assessment reveals no rash and nodules. Skin is Warm and dry.


HEAD: Atraumatic. Normocephalic. 


EYES: Pupils equal and round. No scleral icterus. No injection or drainage. 


ENT: No nasal bleeding or discharge.  Mucous membranes pink and moist.


NECK: Trachea midline. No JVD. 


CARDIOVASCULAR: Regular rate and rhythm.  No murmur appreciated.


RESPIRATORY: No accessory muscle use. Clear to auscultation. Breath sounds 

equal bilaterally. 


GASTROINTESTINAL: Abdomen soft, non-tender, nondistended. Hepatic and splenic 

margins not palpable. 


MUSCULOSKELETAL: No obvious deformities. No clubbing.  No cyanosis.  No edema. 


NEUROLOGICAL: Awake and alert. No obvious cranial nerve deficits.  Motor 

grossly within normal limits. Normal speech.


PSYCHIATRIC: Appropriate mood and affect; insight and judgment normal.





Data


Data


Last Documented VS





Vital Signs








  Date Time  Temp Pulse Resp B/P (MAP) Pulse Ox O2 Delivery O2 Flow Rate FiO2


 


3/19/18 09:19  82 19 124/61 (82) 99 Room Air  


 


3/19/18 05:02 98.3       








Orders





 Orders


Complete Blood Count With Diff (3/19/18 07:51)


Iv Access Insert/Monitor (3/19/18 07:51)


Ondansetron Inj (Zofran Inj) (3/19/18 08:00)


Sodium Chloride 0.9% Flush (Ns Flush) (3/19/18 08:00)


Hydromorphone Pf Inj (Dilaudid Pf Inj) (3/19/18 08:00)


Hydromorphone Pf Inj (Dilaudid Pf Inj) (3/19/18 08:15)





Labs





Laboratory Tests








Test


  3/19/18


08:20


 


White Blood Count 13.1 TH/MM3 


 


Red Blood Count 2.70 MIL/MM3 


 


Hemoglobin 7.1 GM/DL 


 


Hematocrit 21.3 % 


 


Mean Corpuscular Volume 79.0 FL 


 


Mean Corpuscular Hemoglobin 26.3 PG 


 


Mean Corpuscular Hemoglobin


Concent 33.4 % 


 


 


Red Cell Distribution Width 16.8 % 


 


Platelet Count 237 TH/MM3 


 


Mean Platelet Volume 7.6 FL 


 


Neutrophils (%) (Auto) 62.0 % 


 


Lymphocytes (%) (Auto) 26.2 % 


 


Monocytes (%) (Auto) 7.1 % 


 


Eosinophils (%) (Auto) 4.0 % 


 


Basophils (%) (Auto) 0.7 % 


 


Neutrophils # (Auto) 8.1 TH/MM3 


 


Lymphocytes # (Auto) 3.4 TH/MM3 


 


Monocytes # (Auto) 0.9 TH/MM3 


 


Eosinophils # (Auto) 0.5 TH/MM3 


 


Basophils # (Auto) 0.1 TH/MM3 


 


CBC Comment AUTO DIFF 


 


Differential Total Cells


Counted 100 


 


 


Neutrophils % (Manual) 63 % 


 


Band Neutrophils % 3 % 


 


Lymphocytes % 25 % 


 


Monocytes % 7 % 


 


Eosinophils % 2 % 


 


Neutrophils # (Manual) 8.6 TH/MM3 


 


Nucleated Red Blood Cells 5 /100 WBC 


 


Differential Comment


  FINAL DIFF


MANUAL


 


Platelet Estimate NORMAL 


 


Platelet Morphology Comment NORMAL 


 


Polychromasia 4.9 % 


 


Sickle Cells 1+ 


 


Target Cells 1+ 


 


[place] Bodies PRESENT 











MDM


Medical Decision Making


Medical Screen Exam Complete:  Yes


Emergency Medical Condition:  Yes


Medical Record Reviewed:  Yes


Differential Diagnosis


Sickle pain, narcotic seeking behavior, narcotic withdrawal


Narrative Course


I have reviewed the patient's electronic medical record.  Reviewed his last 2 

visits here.





IV placed 


I gave him 1 dose of Zofran and pain medicine





CBC compared to prior


Last hemoglobin was 8.0 with a low of 6.9 in the last several months


Today's is 7.1





My clinical judgment is that he does not require multiple doses of pain 

medication.  He does not look to be in any pain.  He is texting on his phone 

when I enter  the room and seems comfortable.


Patient has refused a liter of normal saline IV


He says he is called the office of his hematologist and is trying to get in 

there today





Diagnosis





 Primary Impression:  


 Sickle cell anemia with pain





***Additional Instructions:  


The patient was advised to follow up with their physician and return if they 

worsen.


***Med/Other Pt SpecificInfo:  Other


Disposition:  01 DISCHARGE HOME


Condition:  Stable











Talon Reza MD Mar 19, 2018 07:51

## 2018-03-22 ENCOUNTER — HOSPITAL ENCOUNTER (INPATIENT)
Dept: HOSPITAL 17 - NEPC | Age: 23
LOS: 1 days | Discharge: HOME | DRG: 812 | End: 2018-03-23
Attending: FAMILY MEDICINE | Admitting: FAMILY MEDICINE
Payer: COMMERCIAL

## 2018-03-22 VITALS
RESPIRATION RATE: 17 BRPM | OXYGEN SATURATION: 98 % | DIASTOLIC BLOOD PRESSURE: 69 MMHG | TEMPERATURE: 97.7 F | HEART RATE: 62 BPM | SYSTOLIC BLOOD PRESSURE: 139 MMHG

## 2018-03-22 VITALS — WEIGHT: 171.96 LBS | BODY MASS INDEX: 22.07 KG/M2 | HEIGHT: 74 IN

## 2018-03-22 VITALS
HEART RATE: 82 BPM | SYSTOLIC BLOOD PRESSURE: 90 MMHG | OXYGEN SATURATION: 95 % | DIASTOLIC BLOOD PRESSURE: 61 MMHG | RESPIRATION RATE: 18 BRPM

## 2018-03-22 VITALS
OXYGEN SATURATION: 98 % | HEART RATE: 67 BPM | RESPIRATION RATE: 18 BRPM | SYSTOLIC BLOOD PRESSURE: 103 MMHG | TEMPERATURE: 97.6 F | DIASTOLIC BLOOD PRESSURE: 56 MMHG

## 2018-03-22 VITALS
RESPIRATION RATE: 18 BRPM | OXYGEN SATURATION: 100 % | SYSTOLIC BLOOD PRESSURE: 130 MMHG | DIASTOLIC BLOOD PRESSURE: 80 MMHG | HEART RATE: 66 BPM | TEMPERATURE: 97.8 F

## 2018-03-22 VITALS
OXYGEN SATURATION: 100 % | SYSTOLIC BLOOD PRESSURE: 116 MMHG | DIASTOLIC BLOOD PRESSURE: 72 MMHG | RESPIRATION RATE: 18 BRPM | HEART RATE: 68 BPM

## 2018-03-22 VITALS
RESPIRATION RATE: 18 BRPM | DIASTOLIC BLOOD PRESSURE: 55 MMHG | OXYGEN SATURATION: 97 % | HEART RATE: 65 BPM | SYSTOLIC BLOOD PRESSURE: 108 MMHG

## 2018-03-22 VITALS
TEMPERATURE: 97.9 F | SYSTOLIC BLOOD PRESSURE: 123 MMHG | OXYGEN SATURATION: 98 % | DIASTOLIC BLOOD PRESSURE: 79 MMHG | HEART RATE: 70 BPM | RESPIRATION RATE: 16 BRPM

## 2018-03-22 VITALS
OXYGEN SATURATION: 99 % | HEART RATE: 77 BPM | SYSTOLIC BLOOD PRESSURE: 110 MMHG | DIASTOLIC BLOOD PRESSURE: 69 MMHG | RESPIRATION RATE: 16 BRPM

## 2018-03-22 VITALS
RESPIRATION RATE: 21 BRPM | DIASTOLIC BLOOD PRESSURE: 59 MMHG | OXYGEN SATURATION: 97 % | SYSTOLIC BLOOD PRESSURE: 102 MMHG | HEART RATE: 67 BPM

## 2018-03-22 VITALS — OXYGEN SATURATION: 98 %

## 2018-03-22 DIAGNOSIS — Z88.5: ICD-10-CM

## 2018-03-22 DIAGNOSIS — Z88.2: ICD-10-CM

## 2018-03-22 DIAGNOSIS — J32.9: ICD-10-CM

## 2018-03-22 DIAGNOSIS — D57.00: Primary | ICD-10-CM

## 2018-03-22 DIAGNOSIS — E74.01: ICD-10-CM

## 2018-03-22 LAB
ALBUMIN SERPL-MCNC: 4.7 GM/DL (ref 3.4–5)
ALP SERPL-CCNC: 70 U/L (ref 45–117)
ALT SERPL-CCNC: 27 U/L (ref 12–78)
AST SERPL-CCNC: 55 U/L (ref 15–37)
BASOPHILS # BLD AUTO: 0 TH/MM3 (ref 0–0.2)
BASOPHILS NFR BLD: 0.2 % (ref 0–2)
BILIRUB SERPL-MCNC: 2.6 MG/DL (ref 0.2–1)
BUN SERPL-MCNC: 8 MG/DL (ref 7–18)
CALCIUM SERPL-MCNC: 8.8 MG/DL (ref 8.5–10.1)
CHLORIDE SERPL-SCNC: 104 MEQ/L (ref 98–107)
CREAT SERPL-MCNC: 0.67 MG/DL (ref 0.6–1.3)
EOSINOPHIL # BLD: 0.7 TH/MM3 (ref 0–0.4)
EOSINOPHIL NFR BLD: 4.1 % (ref 0–4)
ERYTHROCYTE [DISTWIDTH] IN BLOOD BY AUTOMATED COUNT: 17.5 % (ref 11.6–17.2)
GFR SERPLBLD BASED ON 1.73 SQ M-ARVRAT: 177 ML/MIN (ref 89–?)
GLUCOSE SERPL-MCNC: 85 MG/DL (ref 74–106)
HCO3 BLD-SCNC: 26.9 MEQ/L (ref 21–32)
HCT VFR BLD CALC: 24.8 % (ref 39–51)
HGB BLD-MCNC: 8.6 GM/DL (ref 13–17)
HGB S BLD QL SOLY: (no result)
HOWELL-JOLLY BOD BLD QL SMEAR: PRESENT
LYMPHOCYTES # BLD AUTO: 6.1 TH/MM3 (ref 1–4.8)
LYMPHOCYTES NFR BLD AUTO: 37.6 % (ref 9–44)
LYMPHOCYTES: 37 % (ref 9–44)
MCH RBC QN AUTO: 27.9 PG (ref 27–34)
MCHC RBC AUTO-ENTMCNC: 34.6 % (ref 32–36)
MCV RBC AUTO: 80.6 FL (ref 80–100)
MONOCYTE #: 0.7 TH/MM3 (ref 0–0.9)
MONOCYTES NFR BLD: 4.3 % (ref 0–8)
MONOCYTES: 10 % (ref 0–8)
NEUTROPHILS # BLD AUTO: 8.8 TH/MM3 (ref 1.8–7.7)
NEUTROPHILS NFR BLD AUTO: 53.8 % (ref 16–70)
NEUTS BAND # BLD MANUAL: 8.5 TH/MM3 (ref 1.8–7.7)
NEUTS SEG NFR BLD MANUAL: 52 % (ref 16–70)
NUCLEATED RED BLOOD CELL: 1 (ref 0–0)
PLATELET # BLD: 258 TH/MM3 (ref 150–450)
PMV BLD AUTO: 8.2 FL (ref 7–11)
POLYCHROMASIA BLD QL SMEAR: 4 % (ref 0–1.9)
PROT SERPL-MCNC: 8.4 GM/DL (ref 6.4–8.2)
RBC # BLD AUTO: 3.07 MIL/MM3 (ref 4.5–5.9)
RETIC #: 367.8 MIL/L (ref 20–150)
RETICS/RBC NFR: 12 % (ref 0.4–3)
SODIUM SERPL-SCNC: 140 MEQ/L (ref 136–145)
TARGETS BLD QL SMEAR: (no result)
WBC # BLD AUTO: 16.3 TH/MM3 (ref 4–11)
WBC NRBC COR # BLD: 1 /100 WBC (ref 0–0)

## 2018-03-22 PROCEDURE — 71045 X-RAY EXAM CHEST 1 VIEW: CPT

## 2018-03-22 PROCEDURE — 80307 DRUG TEST PRSMV CHEM ANLYZR: CPT

## 2018-03-22 PROCEDURE — 85027 COMPLETE CBC AUTOMATED: CPT

## 2018-03-22 PROCEDURE — 86920 COMPATIBILITY TEST SPIN: CPT

## 2018-03-22 PROCEDURE — 87449 NOS EACH ORGANISM AG IA: CPT

## 2018-03-22 PROCEDURE — 85007 BL SMEAR W/DIFF WBC COUNT: CPT

## 2018-03-22 PROCEDURE — 94150 VITAL CAPACITY TEST: CPT

## 2018-03-22 PROCEDURE — 86901 BLOOD TYPING SEROLOGIC RH(D): CPT

## 2018-03-22 PROCEDURE — 86922 COMPATIBILITY TEST ANTIGLOB: CPT

## 2018-03-22 PROCEDURE — 96375 TX/PRO/DX INJ NEW DRUG ADDON: CPT

## 2018-03-22 PROCEDURE — 96365 THER/PROPH/DIAG IV INF INIT: CPT

## 2018-03-22 PROCEDURE — 86900 BLOOD TYPING SEROLOGIC ABO: CPT

## 2018-03-22 PROCEDURE — 85044 MANUAL RETICULOCYTE COUNT: CPT

## 2018-03-22 PROCEDURE — 87040 BLOOD CULTURE FOR BACTERIA: CPT

## 2018-03-22 PROCEDURE — 93005 ELECTROCARDIOGRAM TRACING: CPT

## 2018-03-22 PROCEDURE — 86850 RBC ANTIBODY SCREEN: CPT

## 2018-03-22 PROCEDURE — 71046 X-RAY EXAM CHEST 2 VIEWS: CPT

## 2018-03-22 PROCEDURE — 87804 INFLUENZA ASSAY W/OPTIC: CPT

## 2018-03-22 PROCEDURE — 96361 HYDRATE IV INFUSION ADD-ON: CPT

## 2018-03-22 PROCEDURE — 84145 PROCALCITONIN (PCT): CPT

## 2018-03-22 PROCEDURE — 80053 COMPREHEN METABOLIC PANEL: CPT

## 2018-03-22 PROCEDURE — 82728 ASSAY OF FERRITIN: CPT

## 2018-03-22 RX ADMIN — PHENYTOIN SODIUM SCH MLS/HR: 50 INJECTION INTRAMUSCULAR; INTRAVENOUS at 20:47

## 2018-03-22 RX ADMIN — HYDROMORPHONE HYDROCHLORIDE PRN MG: 2 INJECTION INTRAMUSCULAR; INTRAVENOUS; SUBCUTANEOUS at 13:45

## 2018-03-22 RX ADMIN — FOLIC ACID SCH MG: 1 TABLET ORAL at 10:35

## 2018-03-22 RX ADMIN — PHENYTOIN SODIUM SCH MLS/HR: 50 INJECTION INTRAMUSCULAR; INTRAVENOUS at 10:22

## 2018-03-22 RX ADMIN — ENOXAPARIN SODIUM SCH MG: 40 INJECTION SUBCUTANEOUS at 10:00

## 2018-03-22 RX ADMIN — SODIUM CHLORIDE SCH MLS/HR: 900 INJECTION INTRAVENOUS at 12:11

## 2018-03-22 RX ADMIN — HYDROXYUREA SCH MG: 500 CAPSULE ORAL at 10:52

## 2018-03-22 RX ADMIN — Medication SCH ML: at 20:46

## 2018-03-22 RX ADMIN — HYDROMORPHONE HYDROCHLORIDE PRN MG: 2 INJECTION INTRAMUSCULAR; INTRAVENOUS; SUBCUTANEOUS at 18:06

## 2018-03-22 RX ADMIN — HYDROMORPHONE HYDROCHLORIDE PRN MG: 2 INJECTION INTRAMUSCULAR; INTRAVENOUS; SUBCUTANEOUS at 23:27

## 2018-03-22 RX ADMIN — AZITHROMYCIN SCH MG: 250 TABLET, FILM COATED ORAL at 12:11

## 2018-03-22 NOTE — RADRPT
EXAM DATE/TIME:  03/22/2018 07:18 

 

[facility] COMPARISON:     

CHEST PA & LAT, September 16, 2016, 19:14.  CHEST SINGLE AP, February 14, 2018, 13:02.  CHEST SINGLE 
AP, February 18, 2018, 11:00.

 

                     

INDICATIONS :     

Chest pain. Sickle cell crisis.

                     

 

MEDICAL HISTORY :     

Sickle Cell disease.          

 

SURGICAL HISTORY :     

None.   

 

ENCOUNTER:     

Subsequent                                        

 

ACUITY:     

1 day      

 

PAIN SCORE:     

7/10

 

LOCATION:     

Bilateral chest 

 

FINDINGS:     

Upright AP view of the chest demonstrates cardiac silhouette size at the upper limits for normal. The
re is subtle opacity in the left lower lobe/retrocardiac region. No pleural effusion or pneumothorax 
is identified. The bones and soft tissues demonstrate no acute finding. EKG lines overlie the patient
.

 

CONCLUSION:     

Mild atelectasis versus consolidation in the left lower lobe.

 

 

 

 Carlitos Cifuentes MD on March 22, 2018 at 7:35           

Board Certified Radiologist.

 This report was verified electronically.

## 2018-03-22 NOTE — PD
Physical Exam


Narrative


Patient signed out to me by Dr. Merrill.  Please see her documentation for 

complete details.  





Briefly, patient is a 22 year old male with history of sickle cell disease, who 

comes in complaining of sickle cell pain.  He says this has been going on for a 

few days and he feels like he is "unable to get on top of it."  He says he has 

had occasional cough and chest pain. 





Exam shows lungs CTA, heart rate RRR.





Data


Data


Last Documented VS





Vital Signs








  Date Time  Temp Pulse Resp B/P (MAP) Pulse Ox O2 Delivery O2 Flow Rate FiO2


 


3/22/18 07:46  67 21 102/59 (73) 97 Room Air  


 


3/22/18 06:30 97.6       








Orders





 Orders


Complete Blood Count With Diff (3/22/18 06:47)


Comprehensive Metabolic Panel (3/22/18 06:47)


Retic Count (3/22/18 06:47)


Chest, Single Ap (3/22/18 06:47)


Ecg Monitoring (3/22/18 06:47)


Iv Access Insert/Monitor (3/22/18 06:47)


Oximetry (3/22/18 06:47)


Ketorolac Inj (Toradol Inj) (3/22/18 07:00)


Sodium Chloride 0.9% Flush (Ns Flush) (3/22/18 07:00)


Sodium Chlor 0.9% 1000 Ml Inj (Ns 1000 M (3/22/18 06:47)


Hydromorphone Pf Inj (Dilaudid Pf Inj) (3/22/18 07:00)


Diphenhydramine Inj (Benadryl Inj) (3/22/18 07:00)


Hydromorphone Pf Inj (Dilaudid Pf Inj) (3/22/18 07:45)


Levofloxacin 750 Mg Premix Inj (Levaquin (3/22/18 08:45)


Hydromorphone Pf Inj (Dilaudid Pf Inj) (3/22/18 08:45)





Labs





Laboratory Tests








Test


  3/22/18


06:56


 


White Blood Count 16.3 TH/MM3 


 


Red Blood Count 3.07 MIL/MM3 


 


Hemoglobin 8.6 GM/DL 


 


Hematocrit 24.8 % 


 


Mean Corpuscular Volume 80.6 FL 


 


Mean Corpuscular Hemoglobin 27.9 PG 


 


Mean Corpuscular Hemoglobin


Concent 34.6 % 


 


 


Red Cell Distribution Width 17.5 % 


 


Platelet Count 258 TH/MM3 


 


Mean Platelet Volume 8.2 FL 


 


Neutrophils (%) (Auto) 53.8 % 


 


Lymphocytes (%) (Auto) 37.6 % 


 


Monocytes (%) (Auto) 4.3 % 


 


Eosinophils (%) (Auto) 4.1 % 


 


Basophils (%) (Auto) 0.2 % 


 


Neutrophils # (Auto) 8.8 TH/MM3 


 


Lymphocytes # (Auto) 6.1 TH/MM3 


 


Monocytes # (Auto) 0.7 TH/MM3 


 


Eosinophils # (Auto) 0.7 TH/MM3 


 


Basophils # (Auto) 0.0 TH/MM3 


 


CBC Comment AUTO DIFF 


 


Differential Total Cells


Counted 100 


 


 


Neutrophils % (Manual) 52 % 


 


Lymphocytes % 37 % 


 


Monocytes % 10 % 


 


Eosinophils % 1 % 


 


Neutrophils # (Manual) 8.5 TH/MM3 


 


Nucleated Red Blood Cells 1 /100 WBC 


 


Differential Comment


  FINAL DIFF


MANUAL


 


Platelet Estimate NORMAL 


 


Platelet Morphology Comment NORMAL 


 


Polychromasia 4.0 % 


 


Sickle Cells 1+ 


 


Target Cells 1+ 


 


[place] Bodies PRESENT 


 


Red Cell Morphology Comment  


 


Reticulocyte Count 12.0 % 


 


Absolute Reticulocyte Count 367.8 MIL/L 


 


Blood Urea Nitrogen 8 MG/DL 


 


Creatinine 0.67 MG/DL 


 


Random Glucose 85 MG/DL 


 


Total Protein 8.4 GM/DL 


 


Albumin 4.7 GM/DL 


 


Calcium Level 8.8 MG/DL 


 


Alkaline Phosphatase 70 U/L 


 


Aspartate Amino Transf


(AST/SGOT) 55 U/L 


 


 


Alanine Aminotransferase


(ALT/SGPT) 27 U/L 


 


 


Total Bilirubin 2.6 MG/DL 


 


Sodium Level 140 MEQ/L 


 


Potassium Level 3.9 MEQ/L 


 


Chloride Level 104 MEQ/L 


 


Carbon Dioxide Level 26.9 MEQ/L 


 


Anion Gap 9 MEQ/L 


 


Estimat Glomerular Filtration


Rate 177 ML/MIN 


 











MDM


Supervised Visit with EDA:  No


Narrative Course


Labs show an elevated WBC count.  Hgb is similar to previous.  





Patient states he feels like he needed fo be admitted.  





CXR concerning for possible early infiltrate.  





Given pain medicine, IVF, Levaquin.


Diagnosis





 Primary Impression:  


 Sickle cell pain crisis


 Additional Impression:  


 Pneumonia


 Qualified Codes:  J18.1 - Lobar pneumonia, unspecified organism





Admitting Information


Admitting Physician Requests:  Admit


Condition:  Stable











Suzanne Wolfe MD Mar 22, 2018 08:43

## 2018-03-22 NOTE — HHI.PR
Addendum to Inpatient Note


Addendum Reason:  Additional Documentation


Additional Information


S: Received call from nurse @1900 regarding patient sitting on the floor, 

complaining of excruciating pain in the chest and back. Did not find relief 

with breakthrough Dilaudid 2 mg every 4 hours for pain. Went to see patient; on 

arrival, patient complained that he had been in the same level of pain for the 

last 12 hours and was upset that he had to wait for pain medication every 4 

hours. States that the pain medication is not working for him and that he does 

not feel that he is being helped. He states that he wants to go home because he 

believes he will find "better believe there because he has things." 





O: Temperature 97.7, pulse 62, respirations 17, blood pressure 139/69, pulse ox 

90% on room air


Patient appears restless, occasionally scratching at his skin, and is breathing 

comfortably


HEENT: Moist mucous membranes, airway patent


Respirations: No use of accessory or intercostal muscles


Cardio: Regular rate


Extremities: Swelling or cyanosis noted





A/P: No concern for acute medical event at this time. Patient appears to be 

distressed by not receiving enough pain medication. When increasing the 

frequency of medication was offered, patient continued to complain of 

discomfort and wanting to go home. Patient was advised to remain in the 

hospital for treatment of pneumonia and monitoring of complications.  


- Will increase frequency of Dilaudid pain 6-10 Q3H and breakthrough pain Q2H


- ECG shows no contiguous ST elevations, will monitor





Seen w/Saba Cosby MD R1 Mar 22, 2018 20:01

## 2018-03-22 NOTE — HHI.HP
HPI


Service


Family Medicine


Primary Care Physician


No Primary Care Physician


Admission Diagnosis


sickle cell crisis


Diagnoses:  


International Travel<30 Days:  No


Contact w/Intl Traveler<30days:  No


Known Affected Area:  No


History of Present Illness


Patient is a 23 y/o M w/G6PD deficiency and sickle cell disease presenting w/

pain.





Last time , states he came to hospital for pain. Received Dilaudid x1. 

Per ER note, patient refused IV hydration and was d/c'd due to appearance of 

pain resolution.  Patient has had multiple visits to the ED every year due to 

pain crisis.





Last night, patient states that acute episode of pain started suddenly started. 

Had 10/10 pain in his legs, lower and middle back and across chest. Over the 

last several weeks, states he has had cough and pain w/swallowing, productive 

of phlegm and green. Denies swelling in joints, no shortness of breath, vision 

changes. Pain is 7/10 now from 10/10. Over the last years, states his episodes 

of pain have occurred randomly and are aggravated by stressors. 





Dr. James is his hematologist, has not been following up with him. Usually 

takes Percocet 1 tab  5-325 mg Q6H for pain. 





 (Saba Gomez MD R1)





Review of Systems


Constitutional:  DENIES: Fatigue, Weight loss


Endocrine:  DENIES: Polydipsia, Polyuria


Eyes:  DENIES: Eye pain, Vision loss


Ears, nose, mouth, throat:  DENIES: Hearing loss, Sinus Pain


Respiratory:  DENIES: Shortness of breath


Cardiovascular:  DENIES: Palpitations, Dyspnea on Exertion


Gastrointestinal:  DENIES: Constipation, Diarrhea


Genitourinary:  DENIES: Urinary frequency, Urinary incontinence


Musculoskeletal:  DENIES: Joint pain, Stiffness


Integumentary:  DENIES: Abnormal pigmentation


Hematologic/lymphatic:  DENIES: Bruising


Immunologic/allergic:  DENIES: Eczema


Neurologic:  DENIES: Abnormal gait, Localized weakness (Saba Gomez MD R1)





Past Family Social History


Past Medical History


G6PD deficiency - allergic to sulfas


Past Surgical History


Tonsillectomy


 (Saba Gomez MD R1)


Allergies:  


Coded Allergies:  


     Sulfa (Sulfonamide Antibiotics) (Unverified  Allergy, Intermediate, itching

, 3/22/18)


     morphine (Unverified  Adverse Reaction, Mild, itching, 3/22/18)


 states it doesn't work and causes him to itch


Uncoded Allergies:  


     BRITNEY BEANS (Allergy, Intermediate, 10/10/14)


Family History


Mom: sickle trait


Dad: sickle trait


Social History


Family in [place], lives here alone, goes to school here. Sister has sickle cell. 


Smoke: None


ETOH: none


Drugs: no illicit or recreational per patient report. Hx of cannabinoid + on 

hospitalization


 (Saba Gomez MD R1)





Physical Exam


Vital Signs





Vital Signs








  Date Time  Temp Pulse Resp B/P (MAP) Pulse Ox O2 Delivery O2 Flow Rate FiO2


 


3/22/18 08:56  77 16 110/69 (83) 99   


 


3/22/18 07:46  67 21 102/59 (73) 97 Room Air  


 


3/22/18 06:30 97.6 67 18 103/56 (72) 98   








Physical Exam


GENERAL: This is a fatigued, pale  male appearing uncomfortable


SKIN:  Cool and dry.


HEAD: Atraumatic. Normocephalic. 


EYES:  Extraocular motions intact. No scleral icterus. No injection or 

drainage. 


ENT:  Uvula midline. Airway patent. Moist mucous membranes


CARDIOVASCULAR: Regular rate and rhythm without murmurs, gallops, or rubs. 


RESPIRATORY: Clear to auscultation. 


GASTROINTESTINAL: Abdomen soft, non-tender, nondistended. No hepato-splenomegaly

, or palpable masses. No guarding.


MUSCULOSKELETAL: Extremities without clubbing, cyanosis, or edema. No joint 

tenderness, effusion, or edema noted. 


NEUROLOGICAL: Awake; however, appears slightly sedated and did not initially 

hear/understand questions or what was said. No focal deficits.  Motor and 

sensory grossly within normal limits.  Normal speech.


Laboratory





Laboratory Tests








Test


  3/22/18


06:56


 


White Blood Count 16.3 


 


Red Blood Count 3.07 


 


Hemoglobin 8.6 


 


Hematocrit 24.8 


 


Mean Corpuscular Volume 80.6 


 


Mean Corpuscular Hemoglobin 27.9 


 


Mean Corpuscular Hemoglobin


Concent 34.6 


 


 


Red Cell Distribution Width 17.5 


 


Platelet Count 258 


 


Mean Platelet Volume 8.2 


 


Neutrophils (%) (Auto) 53.8 


 


Lymphocytes (%) (Auto) 37.6 


 


Monocytes (%) (Auto) 4.3 


 


Eosinophils (%) (Auto) 4.1 


 


Basophils (%) (Auto) 0.2 


 


Neutrophils # (Auto) 8.8 


 


Lymphocytes # (Auto) 6.1 


 


Monocytes # (Auto) 0.7 


 


Eosinophils # (Auto) 0.7 


 


Basophils # (Auto) 0.0 


 


CBC Comment AUTO DIFF 


 


Differential Total Cells


Counted 100 


 


 


Neutrophils % (Manual) 52 


 


Lymphocytes % 37 


 


Monocytes % 10 


 


Eosinophils % 1 


 


Neutrophils # (Manual) 8.5 


 


Nucleated Red Blood Cells 1 


 


Differential Comment


  FINAL DIFF


MANUAL


 


Platelet Estimate NORMAL 


 


Platelet Morphology Comment NORMAL 


 


Polychromasia 4.0 


 


Sickle Cells 1+ 


 


Target Cells 1+ 


 


Baird-Kanorado Bodies PRESENT 


 


Red Cell Morphology Comment  


 


Reticulocyte Count 12.0 


 


Absolute Reticulocyte Count 367.8 


 


Blood Urea Nitrogen 8 


 


Creatinine 0.67 


 


Random Glucose 85 


 


Total Protein 8.4 


 


Albumin 4.7 


 


Calcium Level 8.8 


 


Alkaline Phosphatase 70 


 


Aspartate Amino Transf


(AST/SGOT) 55 


 


 


Alanine Aminotransferase


(ALT/SGPT) 27 


 


 


Total Bilirubin 2.6 


 


Sodium Level 140 


 


Potassium Level 3.9 


 


Chloride Level 104 


 


Carbon Dioxide Level 26.9 


 


Anion Gap 9 


 


Estimat Glomerular Filtration


Rate 177 


 








 (Saba Gomez MD R1)


Result Diagram:  


3/22/18 0656                                                                   

             3/22/18 0656








Caprini VTE Risk Assessment


Caprini VTE Risk Assessment:  Mod/High Risk (score >= 2)


Caprini Risk Assessment Model











 Point Value = 1          Point Value = 2  Point Value = 3  Point Value = 5


 


Age 41-60


Minor surgery


BMI > 25 kg/m2


Swollen legs


Varicose veins


Pregnancy or postpartum


History of unexplained or recurrent


   spontaneous 


Oral contraceptives or hormone


   replacement


Sepsis (< 1 month)


Serious lung disease, including


   pneumonia (< 1 month)


Abnormal pulmonary function


Acute myocardial infarction


Congestive heart failure (< 1 month)


History of inflammatory bowel disease


Medical patient at bed rest Age 61-74


Arthroscopic surgery


Major open surgery (> 45 min)


Laparoscopic surgery (> 45 min)


Malignancy


Confined to bed (> 72 hours)


Immobilizing plaster cast


Central venous access Age >= 75


History of VTE


Family history of VTE


Factor V Leiden


Prothrombin 50982A


Lupus anticoagulant


Anticardiolipin antibodies


Elevated serum homocysteine


Heparin-induced thrombocytopenia


Other congenital or acquired


   thrombophilia Stroke (< 1 month)


Elective arthroplasty


Hip, pelvis, or leg fracture


Acute spinal cord injury (< 1 month)








 (Saba Gomez MD R1)





Assessment and Plan


Assessment and Plan


Patient is 23 y/o M w/sickle cell disease admitted for Community Acquired 

Pneumonia. Will provide hydration, dilaudid for pain, dvt prophylaxis, and 

rocephin and azithro.


Code Status


FULL


 (Saba Gomez MD R1)


Attending Attestation


THIS CASE WAS DISCUSSED WITH THE RESIDENT PHYSICIANS. I HAVE REVIEWED THE 

RECORD AND AGREE WITH THE ABOVE NOTE AND PLAN OF CARE AS WAS DISCUSSED. I HAVE 

AUTHORIZED THE ORDER FOR ADMISSION TO AN IN-PATIENT STATUS.


22 year old with sickle cell disease being admitted for acute pain crisis, 

possible CAP.   See resident documentation for the full history.  


On exam the patient is moving and rubbing legs in pain and is a bit restless, 

he is not having any respiratory distress and able to converse calmly.   He is 

not toxic appearing -- he is awake and alert


HEENT - op clear, no cervical LAD, PEERL


CARDS -- rrr, no mumurs


PULM -- clear, no wheezing,no rales, no rhonchi, no accesory muscle use, 

breathing is not labored, no increased work of breathing


ABD -- s, NT, nd, good bowel sounds


 - no CVT


EXT - no edema, good DP/PT pulses, MAEW


SKIN - no rashes, no lesions


NEURO - intact, gait normal, oriented, alert, normal speech





Sickle cell pain crisis -- will treat with IVF and pain meds.   Consider ACS 

based on possible pneumonia, however, the CXR is not clear and the patient 

clinically does not appear to have any lung findings.  Will get 2views of the 

chest to take a better look, for now cont abx for CAP.  Consider escalation of 

care for ACS if warranted.  


 (Coty Washington MD)


Problem List:  


(1) Sickle cell disease with crisis


ICD Codes:  D57.00 - Hb-SS disease with crisis


Status:  Acute


Plan:  Hgb 7.1 today (baseline)


Vitals stable


Satting comfortably on room air


No increased work of breathing, currently denies cough or SOB


Lung exam benign


Will treat to cover for CAP and acute chest syndrome





Home percocet pain 1-5


IV Dilaudid 1mg Q4 6-10


IV Dilaudid 2 mg IV Q4H breakthrough


Con't home hydroxurea


Will discuss Jadenu w/pharmacy as it is only available IV, Exjade is not 

available


Folic acid supp


Maintenance IVF


Will order UDS to assess for cocaine us, as this can cause acute chest pain or 

exacerbate vasoocclusive episodes 


EKG to r/o MI v PE


Incentive spirometry





(2) Pneumonia


ICD Codes:  J18.9 - Pneumonia, unspecified organism


Status:  Acute


Plan:  Lung exam benign


Concern per CXR


Elevated WBC count


N





Sputum cx


Legionella and pneumococcal antigen


Resp panel


Blood cx


Start Rocephin and Azithro





(3) Sickle cell anemia


ICD Codes:  D57.1 - Sickle cell disease


Status:  Chronic


Plan:  Home meds:Hydroxyurea, Exjade, and Percocet 5-325 Q4H prn for pain


Patient reports that he has been chronically transfused





Hold off chelators for now. Will order ferritin and trend


Monitor LFTs


No chelator if  serum ferritin less than 500 mcg


Due to chronic opiate use hx, will order UDS





Will need to follow-up w/hematology outpatient for ferritin >1500





(4) G6P deficiency (glucose-6-phosphatase deficiency)


ICD Codes:  E74.01 - G6P deficiency (glucose-6-phosphatase deficiency)


Status:  Acute


Plan:  Avoid sulfas and britney beans





(5) FEN


Plan:  Fluids: Maintenance IVF


Electrolytes: as needed


Nutrition: Regular


DVT prophy: Lovenox, SCDs


 (Saba Gomez MD R1)





Physician Certification


2 Midnight Certification Type:  Admission for Inpatient Services


Order for Inpatient Services


The services are ordered in accordance with Medicare regulations or non-

Medicare payer requirements, as applicable.  In the case of services not 

specified as inpatient-only, they are appropriately provided as inpatient 

services in accordance with the 2-midnight benchmark.


Estimated LOS (days):  2


2 days is the estimated time the patient will need to remain in the hospital, 

assuming treatment plan goals are met and no additional complications.


Post-Hospital Plan:  Home


 (Saba Gomez MD R1)


2 Midnight Certification Type:  Admission for Inpatient Services


Post-Hospital Plan:  Home


 (Coty Washington MD)





Problem Qualifiers





(1) Pneumonia:  


Qualified Codes:  J18.1 - Lobar pneumonia, unspecified organism








Saba Gomez MD R1 Mar 22, 2018 09:11


Coty Washington MD Mar 22, 2018 15:35

## 2018-03-22 NOTE — PD
HPI


.


Sickle cell crisis


Chief Complaint:  Sickle Cell


Time Seen by Provider:  06:47


Travel History


International Travel<30 days:  No


Contact w/Intl Traveler<30days:  No


Traveled to known affect area:  No





History of Present Illness


HPI


This patient presents with the chief complaint of the acute onset of sickle 

cell crisis.  It started during the night tonight.  He took Percocet 10 mg 

earlier with temporary relief of his symptoms.  Symptoms recurred just an hour 

or 2 later causing him to present to us for treatment.  He is complaining with 

chest pain, back pain and bilateral lower extremity pain.  He denies any cough, 

fever or shortness of breath.  Pain is rated 10/10.





PFSH


Past Medical History


Hx Anticoagulant Therapy:  No


Anemia:  Yes (SICKLE CELL)


Arthritis:  No


Asthma:  No


Autoimmune Disease:  No


Blood Disorders:  Yes ( G6PD DEFICIENCY)


Anxiety:  No


Depression:  No


Heart Rhythm Problems:  No


Cancer:  No


Cardiovascular Problems:  No


High Cholesterol:  No


Chemotherapy:  No


Chest Pain:  Yes


Congestive Heart Failure:  No


COPD:  No


Cerebrovascular Accident:  No


Diabetes:  No


Diminished Hearing:  No


Endocrine:  No


Gastrointestinal Disorders:  Yes (gall stones)


Genetic Disorder:  Yes (SICKLE CELL, G6PD DEFICIENCY)


GERD:  No


Genitourinary:  No


Headaches:  No


Hiatal Hernia:  No


Heparin Induced Thrombocytopen:  No


Hypertension:  No


Immune Disorder:  Yes (SICKLE CELL)


Implanted Vascular Access Dvce:  No


Kidney Stones:  No


Musculoskeletal:  No


Neurologic:  No


Psychiatric:  Yes


Reproductive:  No


Immunizations Current:  Yes


Migraines:  No


Pneumonia:  Yes


Radiation Therapy:  No


Renal Failure:  No


Seizures:  No


Sickle Cell Disease:  Yes


Sleep Apnea:  No


Thyroid Disease:  No


Ulcer:  No


Tetanus Vaccination:  Unknown


Influenza Vaccination:  No





Past Surgical History


Abdominal Surgery:  No


AICD:  No


Arteriovenous Shunt:  No


Cardiac Surgery:  No


Ear Surgery:  No


Endocrine Surgery:  No


Eye Surgery:  No


Genitourinary Surgery:  No


Gynecologic Surgery:  No


Hysterectomy:  No


Insulin Pump:  No


Joint Replacement:  No


Neurologic Surgery:  No


Oral Surgery:  Yes (dental surgery "AS A SMALL CHILD")


Pacemaker:  No


Thoracic Surgery:  No


Other Surgery:  Yes (PICC LINE /picc line removal)





Social History


Alcohol Use:  No


Tobacco Use:  No


Substance Use:  No





Allergies-Medications


(Allergen,Severity, Reaction):  


Coded Allergies:  


     Sulfa (Sulfonamide Antibiotics) (Unverified  Allergy, Intermediate, itching

, 3/22/18)


     morphine (Unverified  Adverse Reaction, Mild, itching, 3/22/18)


 states it doesn't work and causes him to itch


Uncoded Allergies:  


     LEONARD BEANS (Allergy, Intermediate, 10/10/14)


Reported Meds & Prescriptions





Reported Meds & Active Scripts


Active


Flonase Nasal Spray (Fluticasone Nasal Spray) 50 Mcg/Act Spray 100 Mcg EACH 

NARE BID


Amoxicillin 875 Mg Tab 875 Mg PO BID 10 Days


Folic Acid 1 Mg Tablet 1 Mg PO DAILY


Hydrea (Hydroxyurea) 500 Mg Cap 500 Mg PO DAILY


Reported


Exjade (Deferasirox) 125 Mg Tab   


     Do not chew/swallow tablets whole. Completely disperse tablets in


     water, orange or apple juice (use 3.5 ounces for total doses <1 g; 7


     ounces for doses =1 g).








Review of Systems


Except as stated in HPI:  all other systems reviewed are Neg


General / Constitutional:  No: Fever, Chills


Cardiovascular:  Positive: Chest Pain or Discomfort


Respiratory:  No: Cough, Shortness of Breath


Musculoskeletal:  Positive: Myalgias





Physical Exam


Narrative


GENERAL: Patient is writhing on the bed.


SKIN:  warm/dry.  Good color.


HEAD: Normocephalic.  Atraumatic.


EYES: Pupils equal and round.  Mild scleral icterus. No injection or drainage. 


ENT: No nasal bleeding or discharge.  Mucous membranes pink and moist.


NECK: Trachea midline.  Full range of motion without pain.. 


CARDIOVASCULAR: Regular rate and rhythm.  


RESPIRATORY: No accessory muscle use. 


MUSCULOSKELETAL: No obvious deformities.  No tenderness to palpation.


NEUROLOGICAL: Awake and alert. No obvious cranial nerve deficits.  Motor 

grossly within normal limits. Normal speech.


PSYCHIATRIC: Appropriate mood and affect; insight and judgment normal.





Data


Data


Last Documented VS





Vital Signs








  Date Time  Temp Pulse Resp B/P (MAP) Pulse Ox O2 Delivery O2 Flow Rate FiO2


 


3/22/18 06:30 97.6 67 18 103/56 (72) 98   








Orders





 Orders


Complete Blood Count With Diff (3/22/18 06:47)


Comprehensive Metabolic Panel (3/22/18 06:47)


Retic Count (3/22/18 06:47)


Chest, Single Ap (3/22/18 06:47)


Ecg Monitoring (3/22/18 06:47)


Iv Access Insert/Monitor (3/22/18 06:47)


Oximetry (3/22/18 06:47)


Ketorolac Inj (Toradol Inj) (3/22/18 07:00)


Sodium Chloride 0.9% Flush (Ns Flush) (3/22/18 07:00)


Sodium Chlor 0.9% 1000 Ml Inj (Ns 1000 M (3/22/18 06:47)


Hydromorphone Pf Inj (Dilaudid Pf Inj) (3/22/18 07:00)


Diphenhydramine Inj (Benadryl Inj) (3/22/18 07:00)








MDM


Medical Decision Making


Medical Screen Exam Complete:  Yes


Emergency Medical Condition:  Yes


Medical Record Reviewed:  Yes (This patient was last seen here on 3/19 and 

treated with Dilaudid.)


Differential Diagnosis


My differential diagnosis of sickle cell disease includes but is not limited to 

vaso-occlusive crisis, acute chest syndrome, occult infection, electrolyte 

disturbance, drug-seeking behavior.


Narrative Course


This patient presents with a chief complaint of sickle cell crisis.  Onset was 

a few hours ago.  Pain was temporarily relieved by Percocet.





I have ordered IV fluids, IV Toradol and IV Dilaudid.





Routine sickle cell labs have been ordered.





His care is being turned over to the oncoming provider at 7 AM.





Diagnosis





 Primary Impression:  


 Sickle cell pain crisis


Condition:  Stable











Shannen Merrill MD Mar 22, 2018 06:53

## 2018-03-22 NOTE — PD.AMA
Against Medical Advice Note


Discharge Disposition:  Against Medical Advice


AMA Statement


Patient Elton Mcgee  has decided to leave the hospital against 

medical advice. This patient has the capacity to refuse care and understands 

the risks of leaving, including permanent disability and/or death, and has had 

an opportunity to ask questions about his condition. The patient has been 

informed that he may return for care at any time, and follow up has been 

arranged/advised.However, patient will not be accepted back to Family medicine 

service.











Lexie Crabtree MD, R1 Mar 22, 2018 22:47

## 2018-03-23 VITALS
RESPIRATION RATE: 16 BRPM | TEMPERATURE: 97.9 F | HEART RATE: 84 BPM | OXYGEN SATURATION: 95 % | DIASTOLIC BLOOD PRESSURE: 69 MMHG | SYSTOLIC BLOOD PRESSURE: 108 MMHG

## 2018-03-23 VITALS
OXYGEN SATURATION: 98 % | TEMPERATURE: 97.8 F | HEART RATE: 77 BPM | DIASTOLIC BLOOD PRESSURE: 76 MMHG | SYSTOLIC BLOOD PRESSURE: 123 MMHG | RESPIRATION RATE: 16 BRPM

## 2018-03-23 VITALS
SYSTOLIC BLOOD PRESSURE: 127 MMHG | RESPIRATION RATE: 18 BRPM | OXYGEN SATURATION: 100 % | HEART RATE: 61 BPM | DIASTOLIC BLOOD PRESSURE: 79 MMHG

## 2018-03-23 VITALS — HEART RATE: 69 BPM | DIASTOLIC BLOOD PRESSURE: 74 MMHG | OXYGEN SATURATION: 99 % | SYSTOLIC BLOOD PRESSURE: 98 MMHG

## 2018-03-23 VITALS
HEART RATE: 82 BPM | OXYGEN SATURATION: 98 % | SYSTOLIC BLOOD PRESSURE: 131 MMHG | DIASTOLIC BLOOD PRESSURE: 86 MMHG | TEMPERATURE: 97.3 F | RESPIRATION RATE: 16 BRPM

## 2018-03-23 LAB
ALBUMIN SERPL-MCNC: 4.2 GM/DL (ref 3.4–5)
ALP SERPL-CCNC: 58 U/L (ref 45–117)
ALT SERPL-CCNC: 22 U/L (ref 12–78)
AST SERPL-CCNC: 37 U/L (ref 15–37)
BASOPHILS # BLD AUTO: 0 TH/MM3 (ref 0–0.2)
BASOPHILS NFR BLD: 0.3 % (ref 0–2)
BILIRUB SERPL-MCNC: 2.7 MG/DL (ref 0.2–1)
BUN SERPL-MCNC: 6 MG/DL (ref 7–18)
CALCIUM SERPL-MCNC: 8.5 MG/DL (ref 8.5–10.1)
CHLORIDE SERPL-SCNC: 105 MEQ/L (ref 98–107)
CREAT SERPL-MCNC: 0.48 MG/DL (ref 0.6–1.3)
EOSINOPHIL # BLD: 0.3 TH/MM3 (ref 0–0.4)
EOSINOPHIL NFR BLD: 2.3 % (ref 0–4)
ERYTHROCYTE [DISTWIDTH] IN BLOOD BY AUTOMATED COUNT: 17.4 % (ref 11.6–17.2)
FERRITIN SERPL-MCNC: 2328 NG/ML (ref 26–388)
GFR SERPLBLD BASED ON 1.73 SQ M-ARVRAT: 260 ML/MIN (ref 89–?)
GLUCOSE SERPL-MCNC: 122 MG/DL (ref 74–106)
HCO3 BLD-SCNC: 24.8 MEQ/L (ref 21–32)
HCT VFR BLD CALC: 20.1 % (ref 39–51)
HGB BLD-MCNC: 6.7 GM/DL (ref 13–17)
HGB S BLD QL SOLY: (no result)
LYMPHOCYTES # BLD AUTO: 1.7 TH/MM3 (ref 1–4.8)
LYMPHOCYTES NFR BLD AUTO: 12.6 % (ref 9–44)
LYMPHOCYTES: 13 % (ref 9–44)
MCH RBC QN AUTO: 26.4 PG (ref 27–34)
MCHC RBC AUTO-ENTMCNC: 33.4 % (ref 32–36)
MCV RBC AUTO: 79.1 FL (ref 80–100)
MONOCYTE #: 0.8 TH/MM3 (ref 0–0.9)
MONOCYTES NFR BLD: 5.8 % (ref 0–8)
MONOCYTES: 8 % (ref 0–8)
NEUTROPHILS # BLD AUTO: 10.7 TH/MM3 (ref 1.8–7.7)
NEUTROPHILS NFR BLD AUTO: 79 % (ref 16–70)
NEUTS BAND # BLD MANUAL: 10.6 TH/MM3 (ref 1.8–7.7)
NEUTS BAND NFR BLD: 1 % (ref 0–6)
NEUTS SEG NFR BLD MANUAL: 77 % (ref 16–70)
NUCLEATED RED BLOOD CELL: 1 (ref 0–0)
PLATELET # BLD: 211 TH/MM3 (ref 150–450)
PMV BLD AUTO: 7.8 FL (ref 7–11)
POLYCHROMASIA BLD QL SMEAR: 2.6 % (ref 0–1.9)
PROT SERPL-MCNC: 7.6 GM/DL (ref 6.4–8.2)
RBC # BLD AUTO: 2.54 MIL/MM3 (ref 4.5–5.9)
SODIUM SERPL-SCNC: 138 MEQ/L (ref 136–145)
TARGETS BLD QL SMEAR: (no result)
WBC # BLD AUTO: 13.6 TH/MM3 (ref 4–11)
WBC NRBC COR # BLD: 1 /100 WBC (ref 0–0)

## 2018-03-23 RX ADMIN — Medication SCH ML: at 09:41

## 2018-03-23 RX ADMIN — HYDROXYUREA SCH MG: 500 CAPSULE ORAL at 09:42

## 2018-03-23 RX ADMIN — FOLIC ACID SCH MG: 1 TABLET ORAL at 09:41

## 2018-03-23 RX ADMIN — SODIUM CHLORIDE SCH MLS/HR: 900 INJECTION INTRAVENOUS at 10:00

## 2018-03-23 RX ADMIN — PHENYTOIN SODIUM SCH MLS/HR: 50 INJECTION INTRAMUSCULAR; INTRAVENOUS at 07:03

## 2018-03-23 RX ADMIN — ENOXAPARIN SODIUM SCH MG: 40 INJECTION SUBCUTANEOUS at 10:00

## 2018-03-23 RX ADMIN — HYDROMORPHONE HYDROCHLORIDE PRN MG: 2 INJECTION INTRAMUSCULAR; INTRAVENOUS; SUBCUTANEOUS at 03:39

## 2018-03-23 RX ADMIN — HYDROMORPHONE HYDROCHLORIDE PRN MG: 2 INJECTION INTRAMUSCULAR; INTRAVENOUS; SUBCUTANEOUS at 07:03

## 2018-03-23 RX ADMIN — HYDROMORPHONE HYDROCHLORIDE PRN MG: 2 INJECTION INTRAMUSCULAR; INTRAVENOUS; SUBCUTANEOUS at 09:43

## 2018-03-23 RX ADMIN — HYDROMORPHONE HYDROCHLORIDE PRN MG: 2 INJECTION INTRAMUSCULAR; INTRAVENOUS; SUBCUTANEOUS at 01:19

## 2018-03-23 RX ADMIN — AZITHROMYCIN SCH MG: 250 TABLET, FILM COATED ORAL at 09:41

## 2018-03-23 NOTE — HHI.DCPOC
Discharge Care Plan


Diagnosis:  


(1) Sinusitis


(2) Sickle cell disease with crisis


Goals to Promote Your Health


* To prevent worsening of your condition and complications


* To maintain your health at the optimal level


Directions to Meet Your Goals


*** Take your medications as prescribed


*** Follow your dietary instruction


*** Follow activity as directed








*** Keep your appointments as scheduled


*** Take your immunizations and boosters as scheduled


*** If your symptoms worsen call your PCP, if no PCP go to Urgent Care Center 

or Emergency Room***


*** Smoking is Dangerous to Your Health. Avoid second hand smoke***


***Call the 24-hour hour crisis hotline for domestic abuse at 1-440.503.7494***











Karol Castelan MD, R3 Mar 23, 2018 10:19

## 2018-03-23 NOTE — HHI.FPPN
Subjective


Remarks


No acute events overnight.  Vital signs unremarkable.  Of note patient was 

about to leave AMA overnight but decided to stay for continued treatment.  This 

morning he states that he continues to have generalized pain but wants to be in 

the comforts of his own bed. Feels like he could recover better there as he can 

get in his bath and get better sleep. Patient otherwise denies anymore chest 

pain


 (Karol Castelan MD, R3)





Objective


Vitals





Vital Signs








  Date Time  Temp Pulse Resp B/P (MAP) Pulse Ox O2 Delivery O2 Flow Rate FiO2


 


3/23/18 08:09 97.9 84 16 108/69 (82) 95   


 


3/23/18 07:00  69  98/74 (82) 99   


 


3/23/18 05:16   18     


 


3/23/18 03:28 97.3 82 16 131/86 (101) 98   


 


3/23/18 02:59   18     


 


3/23/18 01:16  61 18 127/79 (95) 100   


 


3/22/18 23:26  68 18 116/72 (87) 100   


 


3/22/18 22:08   18     


 


3/22/18 21:28 97.9 70 16 123/79 (94) 98   


 


3/22/18 20:00     98   


 


3/22/18 19:30 97.7 62 17 139/69 (92) 98   


 


3/22/18 17:25 97.8 66 18 130/80 (97) 100   


 


3/22/18 16:26     97   


 


3/22/18 12:00  82 18 90/61 (71) 95 Room Air  














I/O      


 


 3/22/18 3/22/18 3/22/18 3/23/18 3/23/18 3/23/18





 07:00 15:00 23:00 07:00 15:00 23:00


 


Intake Total    620 ml  


 


Balance    620 ml  


 


      


 


Intake Oral    620 ml  


 


# Voids    3  








 (Karol Castelan MD, R3)


Result Diagram:  


3/23/18 0720                                                                   

             3/23/18 0720





Imaging





Last Impressions








Chest X-Ray 3/23/18 0000 Signed





Impressions: 





 Service Date/Time:  Friday, March 23, 2018 08:23 - CONCLUSION: No acute 

disease. 





       Lester Campbell MD 








Objective Remarks


GEN: Well-developed, well-nourished patient. No acute distress.  Up walking 

around and dancing in no acute distress.  Constantly asking when he can go home.


CV: Regular rate and rhythm without obvious murmurs


LUNGS: Clear to auscultation bilaterally. Normal respiratory effort. No wheezes

, rales, rhonchi.


GI: Non distended.


EXT: No edema.Ambulating without difficulty


NEURO/PSYCH: Awake, alert. Appropriate insight and judgment. Normal speech


 (Karol Castelan MD, R3)





A/P


Assessment and Plan


Patient is 21 y/o M w/sickle cell disease admitted for sickle cell pain crisis


Discharge Planning


Today after transfusion


**Was notified later on that transfusion will not arrive until 2hrs from now 

due to the number of antibodies present. Patient is not willing to wait. Will 

still discharge the patient but he understands that he does need the 

transfusion.





sdw Dr. Chris, Dr. Washington, Dr. Crabtree, and Dr. Gomez


 (Karol Castelan MD, R3)


Attending Attestation


Patient seen and examined.  Case reviewed and discussed with the resident team.

  Agree with plan of care as discussed with me and documented in the resident 

note.  Patient clinically well and has a good grasp of what he needs for his 

disease process, he is not comfortable in the hospital and would likely have 

better pain control at home in his own environment.    Would like to transfuse 

today prior to discharge but due to the patient antibodies, this will take 

several hours to arrive from [place] so the patient would like to hold off and 

be discharged with fu with his heme physician.   He is clinically stable so I 

feel this is reasonable.   DC to home with close fu 


 (Coty Washington MD)


Problem List:  


(1) Sickle cell disease with crisis


ICD Codes:  D57.00 - Hb-SS disease with crisis


Status:  Acute


Plan:  Patient presenting with sickle cell pain crisis with possible concern 

for acute chest due to associated chest pain.  Initial chest x-ray was 

concerning for atelectasis versus consolidation.  Repeat chest x-ray showed no 

acute disease and there is no concern for acute chest syndrome at this time.   

Hemoglobin on admission was at baseline at around 8 but decreased to 6.7 on 

repeat the following day.


-Patient still with some generalized pain but with no chest pain.


* Patient is walking around on the hospital floor and dancing. Clinically well 

appearing.


-Vital signs stable and patient is not requiring additional oxygen.


-Spoke with patient's hematologist Dr. Glover who reports patient typically 

needs 4mg of Dilaudid and fluids to help with his pain control. He has no 

concern for drug seeking behavior. I would agree with this assessment on my 

examination.


-Due to drop in hgb from baseline, will transfuse 1unit after discussing with 

Dr. Glover. Patient is to have close follow up afterwards.





Medications


* Fluids, see rate below


* continue home hydroxyurea


* discontinue azithromycin and Rocephin.


* Pain control with oral Percocet and IV Dilaudid





(2) Sinusitis


ICD Codes:  J32.9 - Chronic sinusitis, unspecified


Plan:  Initially concerned for pneumonia for possible acute chest syndrome but 

chest x-ray is unremarkable.  However pro calcitonin was elevated and patient 

also has slightly elevated leukocytosis which has improved.  Symptoms may be 

related to sinusitis rather than pneumonia.  Leukocytosis did improve with 1 

dose of Rocephin and azithromycin.


-Discontinue Rocephin and azithromycin as there is no concern for acute chest 

but will complete treatment with Augmentin 5 days for sinusitis.





(3) G6P deficiency (glucose-6-phosphatase deficiency)


ICD Codes:  E74.01 - G6P deficiency (glucose-6-phosphatase deficiency)


Status:  Acute


Plan:  Avoid sulfas and britney beans





(4) FEN


Plan:  Fluids: Maintenance IVF


Electrolytes: as needed


Nutrition: Regular


DVT prophy: Lovenox, SCDs


 (Karol Castelan MD, R3)











Karol Castelan MD, R3 Mar 23, 2018 12:03


Coty Washington MD Mar 23, 2018 14:00

## 2018-03-23 NOTE — EKG
Date Performed: 03/22/2018       Time Performed: 15:57:51

 

PTAGE:      22 years

 

EKG:      SINUS BRADYCARDIA WITH SINUS ARRHYTHMIA Since previous tracing, no significant change noted
 BORDERLINE ECG

 

PREVIOUS TRACING       : 07/23/2017 12.09

 

DOCTOR:   Jerson Fletcher  Interpretating Date/Time  03/23/2018 19:55:38

## 2018-03-23 NOTE — RADRPT
EXAM DATE/TIME:  03/23/2018 08:23 

 

[facility] COMPARISON:     

CHEST PA & LAT, Gege 10, 2017, 10:38.

 

                     

INDICATIONS :     

Chest pain, sickle cell crisis. 

                     

 

MEDICAL HISTORY :     

Sickle Cell disease.       Gall stones.   

 

SURGICAL HISTORY :     

None.   

 

ENCOUNTER:     

Subsequent                                        

 

ACUITY:     

2 days      

 

PAIN SCORE:     

6/10

 

LOCATION:       

chest/back

 

FINDINGS:     

PA and lateral views of the chest demonstrate the lungs to be symmetrically aerated without evidence 
of mass, infiltrate or effusion.  The cardiomediastinal contours are unremarkable.  Osseous structure
s are intact.

 

CONCLUSION:     No acute disease.  

 

 

 

 Lester Campbell MD on March 23, 2018 at 8:39           

Board Certified Radiologist.

 This report was verified electronically.

## 2018-03-31 ENCOUNTER — HOSPITAL ENCOUNTER (EMERGENCY)
Dept: HOSPITAL 17 - NEPC | Age: 23
Discharge: HOME | End: 2018-03-31
Payer: COMMERCIAL

## 2018-03-31 VITALS
HEART RATE: 65 BPM | TEMPERATURE: 97.7 F | RESPIRATION RATE: 16 BRPM | OXYGEN SATURATION: 100 % | SYSTOLIC BLOOD PRESSURE: 105 MMHG | DIASTOLIC BLOOD PRESSURE: 55 MMHG

## 2018-03-31 VITALS — SYSTOLIC BLOOD PRESSURE: 107 MMHG | DIASTOLIC BLOOD PRESSURE: 57 MMHG

## 2018-03-31 VITALS — HEART RATE: 57 BPM | RESPIRATION RATE: 18 BRPM | OXYGEN SATURATION: 99 %

## 2018-03-31 VITALS — BODY MASS INDEX: 20.55 KG/M2 | HEIGHT: 66 IN | WEIGHT: 127.87 LBS

## 2018-03-31 DIAGNOSIS — M79.605: ICD-10-CM

## 2018-03-31 DIAGNOSIS — R07.9: ICD-10-CM

## 2018-03-31 DIAGNOSIS — D55.0: ICD-10-CM

## 2018-03-31 DIAGNOSIS — M79.1: ICD-10-CM

## 2018-03-31 DIAGNOSIS — D57.00: Primary | ICD-10-CM

## 2018-03-31 DIAGNOSIS — M79.604: ICD-10-CM

## 2018-03-31 LAB
ALBUMIN SERPL-MCNC: 4.7 GM/DL (ref 3.4–5)
ALP SERPL-CCNC: 63 U/L (ref 45–117)
ALT SERPL-CCNC: 20 U/L (ref 12–78)
AST SERPL-CCNC: 37 U/L (ref 15–37)
BASOPHILS # BLD AUTO: 0 TH/MM3 (ref 0–0.2)
BASOPHILS NFR BLD: 0.4 % (ref 0–2)
BILIRUB SERPL-MCNC: 2.3 MG/DL (ref 0.2–1)
BUN SERPL-MCNC: 11 MG/DL (ref 7–18)
CALCIUM SERPL-MCNC: 9 MG/DL (ref 8.5–10.1)
CHLORIDE SERPL-SCNC: 111 MEQ/L (ref 98–107)
CREAT SERPL-MCNC: 0.57 MG/DL (ref 0.6–1.3)
CRP SERPL-MCNC: 0.59 MG/DL (ref 0–0.3)
EOSINOPHIL # BLD: 0.4 TH/MM3 (ref 0–0.4)
EOSINOPHIL NFR BLD: 3.2 % (ref 0–4)
ERYTHROCYTE [DISTWIDTH] IN BLOOD BY AUTOMATED COUNT: 15.5 % (ref 11.6–17.2)
GFR SERPLBLD BASED ON 1.73 SQ M-ARVRAT: 213 ML/MIN (ref 89–?)
GLUCOSE SERPL-MCNC: 81 MG/DL (ref 74–106)
HCO3 BLD-SCNC: 24.2 MEQ/L (ref 21–32)
HCT VFR BLD CALC: 25.3 % (ref 39–51)
HGB BLD-MCNC: 8.3 GM/DL (ref 13–17)
HGB S BLD QL SOLY: (no result)
HOWELL-JOLLY BOD BLD QL SMEAR: PRESENT
LYMPHOCYTES # BLD AUTO: 2.3 TH/MM3 (ref 1–4.8)
LYMPHOCYTES NFR BLD AUTO: 20.1 % (ref 9–44)
MCH RBC QN AUTO: 26.5 PG (ref 27–34)
MCHC RBC AUTO-ENTMCNC: 33 % (ref 32–36)
MCV RBC AUTO: 80.4 FL (ref 80–100)
MONOCYTE #: 0.6 TH/MM3 (ref 0–0.9)
MONOCYTES NFR BLD: 5 % (ref 0–8)
NEUTROPHILS # BLD AUTO: 8.1 TH/MM3 (ref 1.8–7.7)
NEUTROPHILS NFR BLD AUTO: 71.3 % (ref 16–70)
PLATELET # BLD: 216 TH/MM3 (ref 150–450)
PMV BLD AUTO: 8.2 FL (ref 7–11)
POLYCHROMASIA BLD QL SMEAR: 2.4 % (ref 0–1.9)
PROT SERPL-MCNC: 8 GM/DL (ref 6.4–8.2)
RBC # BLD AUTO: 3.15 MIL/MM3 (ref 4.5–5.9)
RETIC #: 120.8 MIL/L (ref 20–150)
RETICS/RBC NFR: 3.8 % (ref 0.4–3)
SODIUM SERPL-SCNC: 142 MEQ/L (ref 136–145)
TARGETS BLD QL SMEAR: (no result)
TROPONIN I SERPL-MCNC: (no result) NG/ML (ref 0.02–0.05)
WBC # BLD AUTO: 11.3 TH/MM3 (ref 4–11)

## 2018-03-31 PROCEDURE — 99285 EMERGENCY DEPT VISIT HI MDM: CPT

## 2018-03-31 PROCEDURE — 93005 ELECTROCARDIOGRAM TRACING: CPT

## 2018-03-31 PROCEDURE — 82550 ASSAY OF CK (CPK): CPT

## 2018-03-31 PROCEDURE — 85025 COMPLETE CBC W/AUTO DIFF WBC: CPT

## 2018-03-31 PROCEDURE — 96375 TX/PRO/DX INJ NEW DRUG ADDON: CPT

## 2018-03-31 PROCEDURE — 85044 MANUAL RETICULOCYTE COUNT: CPT

## 2018-03-31 PROCEDURE — 96376 TX/PRO/DX INJ SAME DRUG ADON: CPT

## 2018-03-31 PROCEDURE — 96374 THER/PROPH/DIAG INJ IV PUSH: CPT

## 2018-03-31 PROCEDURE — 71045 X-RAY EXAM CHEST 1 VIEW: CPT

## 2018-03-31 PROCEDURE — 86140 C-REACTIVE PROTEIN: CPT

## 2018-03-31 PROCEDURE — 84484 ASSAY OF TROPONIN QUANT: CPT

## 2018-03-31 PROCEDURE — 80053 COMPREHEN METABOLIC PANEL: CPT

## 2018-03-31 NOTE — RADRPT
EXAM DATE/TIME:  03/31/2018 11:20 

 

[facility] COMPARISON:     

CHEST SINGLE AP, March 22, 2018, 7:18.

 

                     

INDICATIONS :     

Short of Breath

                     

 

MEDICAL HISTORY :     

Sickle Cell disease.          

 

SURGICAL HISTORY :     

None.   

 

ENCOUNTER:     

Initial                                        

 

ACUITY:     

1 day      

 

PAIN SCORE:     

3/10

 

LOCATION:      

chest 

 

FINDINGS:     

A single view of the chest demonstrates the lungs to be symmetrically aerated without evidence of mas
s, infiltrate or effusion.  The cardiomediastinal contours are unremarkable.  Osseous structures are 
intact.

 

CONCLUSION:     No acute disease.  

 

 

 

 Kel Nathan MD on March 31, 2018 at 11:51           

Board Certified Radiologist.

 This report was verified electronically.

## 2018-03-31 NOTE — PD
HPI


Chief Complaint:  Sickle Cell


Time Seen by Provider:  11:01


Travel History


International Travel<30 days:  No


Contact w/Intl Traveler<30days:  No


Traveled to known affect area:  No





History of Present Illness


HPI


Patient is a 22-year-old male presenting to the emergency for evaluation 

crisis.  Patient states it started yesterday in his right leg, he has trialed 

his normal home therapies including using a heating pad.  Despite conservative 

management his symptoms progressed, today they were worse, he attempted to go 

to work but the pain in his legs became so severe he had difficulty walking.  

He also reports chest pain.  He states his pain is sharp, he rates an 8 out of 

10.  There are no alleviating factors.  Pain in the legs is exacerbated with 

movement.  Patient is followed by Dr. Glover for hematology.  Patient has a 

history of G6PD deficiency and sickle cell anemia.





[facility]


Past Medical History


Anemia:  Yes (SICKLE CELL)


Blood Disorders:  Yes ( G6PD DEFICIENCY)


Chest Pain:  Yes


Genetic Disorder:  Yes (SICKLE CELL, G6PD DEFICIENCY)


Immunizations Current:  Yes


Pneumonia:  Yes


Sickle Cell Disease:  Yes





Social History


Alcohol Use:  No


Tobacco Use:  No


Substance Use:  No





Allergies-Medications


(Allergen,Severity, Reaction):  


Coded Allergies:  


     Sulfa (Sulfonamide Antibiotics) (Unverified  Allergy, Intermediate, itching

, 3/22/18)


     morphine (Unverified  Adverse Reaction, Mild, itching, 3/22/18)


 states it doesn't work and causes him to itch


Uncoded Allergies:  


     LEONARD BEANS (Allergy, Intermediate, 10/10/14)


Reported Meds & Prescriptions





Reported Meds & Active Scripts


Active


Augmentin (Amoxicillin-Clavulanate) 875-125 Mg Tab 1 Tab PO BID


Folic Acid 1 Mg Tablet 1 Mg PO DAILY


Hydrea (Hydroxyurea) 500 Mg Cap 500 Mg PO DAILY








Review of Systems


Except as stated in HPI:  all other systems reviewed are Neg


Cardiovascular:  Positive: Chest Pain or Discomfort


Musculoskeletal:  Positive: Myalgias, Pain


Skin:  Positive Change in Pigmentation





Physical Exam


Narrative


GENERAL: Well-developed, well-nourished, alert -American male.  Appears 

uncomfortable, no acute distress.


SKIN: Warm and dry.


HEAD: Atraumatic. Normocephalic. 


EYES: Pupils equal and round. No scleral icterus. No injection or drainage. 


ENT: No nasal bleeding or discharge.  Mucous membranes pink and moist.


NECK: Trachea midline. No JVD. 


CARDIOVASCULAR: Regular rate and rhythm.  


RESPIRATORY: No accessory muscle use. Clear to auscultation. Breath sounds 

equal bilaterally. 


GASTROINTESTINAL: Abdomen soft, non-tender, nondistended. Hepatic and splenic 

margins not palpable. 


MUSCULOSKELETAL: Extremities without clubbing, cyanosis, or edema. No obvious 

deformities. 


NEUROLOGICAL: Awake and alert. No obvious cranial nerve deficits.  Motor 

grossly within normal limits. Five out of 5 muscle strength in the arms and 

legs.  Normal speech.


PSYCHIATRIC: Appropriate mood and affect; insight and judgment normal.





Data


Data


Last Documented VS





Vital Signs








  Date Time  Temp Pulse Resp B/P (MAP) Pulse Ox O2 Delivery O2 Flow Rate FiO2


 


3/31/18 11:11  57 18  99 Room Air  


 


3/31/18 10:55 97.7   105/55 (72)    








Orders





 Orders


C-Reactive Protein (Crp) (3/31/18 11:06)


Complete Blood Count With Diff (3/31/18 11:06)


Comprehensive Metabolic Panel (3/31/18 11:06)


Retic Count (3/31/18 11:06)


Chest, Single Ap (3/31/18 11:06)


Ecg Monitoring (3/31/18 11:06)


Iv Access Insert/Monitor (3/31/18 11:06)


Oximetry (3/31/18 11:06)


Hydromorphone Pf Inj (Dilaudid Pf Inj) (3/31/18 11:15)


Sodium Chloride 0.9% Flush (Ns Flush) (3/31/18 11:15)


Sodium Chlor 0.9% 1000 Ml Inj (Ns 1000 M (3/31/18 11:06)


Ondansetron Inj (Zofran Inj) (3/31/18 11:15)


Sodium Chlor 0.9% 1000 Ml Inj (Ns 1000 M (3/31/18 11:15)


Troponin I (3/31/18 11:12)


Ckmb (Isoenzyme) Profile (3/31/18 11:12)


Electrocardiogram (3/31/18 )


Hydromorphone Pf Inj (Dilaudid Pf Inj) (3/31/18 12:30)





Labs





Laboratory Tests








Test


  3/31/18


11:34


 


White Blood Count 11.3 TH/MM3 


 


Red Blood Count 3.15 MIL/MM3 


 


Hemoglobin 8.3 GM/DL 


 


Hematocrit 25.3 % 


 


Mean Corpuscular Volume 80.4 FL 


 


Mean Corpuscular Hemoglobin 26.5 PG 


 


Mean Corpuscular Hemoglobin


Concent 33.0 % 


 


 


Red Cell Distribution Width 15.5 % 


 


Platelet Count 216 TH/MM3 


 


Mean Platelet Volume 8.2 FL 


 


Neutrophils (%) (Auto) 71.3 % 


 


Lymphocytes (%) (Auto) 20.1 % 


 


Monocytes (%) (Auto) 5.0 % 


 


Eosinophils (%) (Auto) 3.2 % 


 


Basophils (%) (Auto) 0.4 % 


 


Neutrophils # (Auto) 8.1 TH/MM3 


 


Lymphocytes # (Auto) 2.3 TH/MM3 


 


Monocytes # (Auto) 0.6 TH/MM3 


 


Eosinophils # (Auto) 0.4 TH/MM3 


 


Basophils # (Auto) 0.0 TH/MM3 


 


CBC Comment AUTO DIFF 


 


Differential Comment


  AUTO DIFF


CONFIRMED


 


Platelet Estimate NORMAL 


 


Platelet Morphology Comment ENLARGED 


 


Polychromasia 2.4 % 


 


Sickle Cells 1+ 


 


Target Cells 1+ 


 


Baird-Owings Bodies PRESENT 


 


Reticulocyte Count 3.8 % 


 


Absolute Reticulocyte Count 120.8 MIL/L 


 


Blood Urea Nitrogen 11 MG/DL 


 


Creatinine 0.57 MG/DL 


 


Random Glucose 81 MG/DL 


 


Total Protein 8.0 GM/DL 


 


Albumin 4.7 GM/DL 


 


Calcium Level 9.0 MG/DL 


 


Alkaline Phosphatase 63 U/L 


 


Aspartate Amino Transf


(AST/SGOT) 37 U/L 


 


 


Alanine Aminotransferase


(ALT/SGPT) 20 U/L 


 


 


Total Bilirubin 2.3 MG/DL 


 


Sodium Level 142 MEQ/L 


 


Potassium Level 3.9 MEQ/L 


 


Chloride Level 111 MEQ/L 


 


Carbon Dioxide Level 24.2 MEQ/L 


 


Anion Gap 7 MEQ/L 


 


Estimat Glomerular Filtration


Rate 213 ML/MIN 


 


 


Total Creatine Kinase 54 U/L 


 


Troponin I


  LESS THAN 0.02


NG/ML


 


C-Reactive Protein 0.59 MG/DL 











MDM


Medical Decision Making


Medical Screen Exam Complete:  Yes


Emergency Medical Condition:  Yes


Medical Record Reviewed:  Yes


Interpretation(s)





Vital Signs








  Date Time  Temp Pulse Resp B/P (MAP) Pulse Ox O2 Delivery O2 Flow Rate FiO2


 


3/31/18 11:11  57 18  99 Room Air  


 


3/31/18 11:07  57 18  99   


 


3/31/18 10:55 97.7 65 16 105/55 (72) 100   








Last Impressions








Chest X-Ray 3/31/18 1106 Signed





Impressions: 





 Service Date/Time:  Saturday, March 31, 2018 11:20 - CONCLUSION: No acute 





 disease.       Kel Nathan MD 








Laboratory Tests








Test


  3/31/18


11:34


 


White Blood Count 11.3 TH/MM3 


 


Red Blood Count 3.15 MIL/MM3 


 


Hemoglobin 8.3 GM/DL 


 


Hematocrit 25.3 % 


 


Mean Corpuscular Volume 80.4 FL 


 


Mean Corpuscular Hemoglobin 26.5 PG 


 


Mean Corpuscular Hemoglobin


Concent 33.0 % 


 


 


Red Cell Distribution Width 15.5 % 


 


Platelet Count 216 TH/MM3 


 


Mean Platelet Volume 8.2 FL 


 


Neutrophils (%) (Auto) 71.3 % 


 


Lymphocytes (%) (Auto) 20.1 % 


 


Monocytes (%) (Auto) 5.0 % 


 


Eosinophils (%) (Auto) 3.2 % 


 


Basophils (%) (Auto) 0.4 % 


 


Neutrophils # (Auto) 8.1 TH/MM3 


 


Lymphocytes # (Auto) 2.3 TH/MM3 


 


Monocytes # (Auto) 0.6 TH/MM3 


 


Eosinophils # (Auto) 0.4 TH/MM3 


 


Basophils # (Auto) 0.0 TH/MM3 


 


CBC Comment AUTO DIFF 


 


Differential Comment


  AUTO DIFF


CONFIRMED


 


Platelet Estimate NORMAL 


 


Platelet Morphology Comment ENLARGED 


 


Polychromasia 2.4 % 


 


Sickle Cells 1+ 


 


Target Cells 1+ 


 


Baird-Owings Bodies PRESENT 


 


Reticulocyte Count 3.8 % 


 


Absolute Reticulocyte Count 120.8 MIL/L 


 


Blood Urea Nitrogen 11 MG/DL 


 


Creatinine 0.57 MG/DL 


 


Random Glucose 81 MG/DL 


 


Total Protein 8.0 GM/DL 


 


Albumin 4.7 GM/DL 


 


Calcium Level 9.0 MG/DL 


 


Alkaline Phosphatase 63 U/L 


 


Aspartate Amino Transf


(AST/SGOT) 37 U/L 


 


 


Alanine Aminotransferase


(ALT/SGPT) 20 U/L 


 


 


Total Bilirubin 2.3 MG/DL 


 


Sodium Level 142 MEQ/L 


 


Potassium Level 3.9 MEQ/L 


 


Chloride Level 111 MEQ/L 


 


Carbon Dioxide Level 24.2 MEQ/L 


 


Anion Gap 7 MEQ/L 


 


Estimat Glomerular Filtration


Rate 213 ML/MIN 


 


 


Total Creatine Kinase 54 U/L 


 


Troponin I


  LESS THAN 0.02


NG/ML


 


C-Reactive Protein 0.59 MG/DL 








Vital Signs








  Date Time  Temp Pulse Resp B/P (MAP) Pulse Ox O2 Delivery O2 Flow Rate FiO2


 


3/31/18 11:07  57 18  99   


 


3/31/18 10:55 97.7 65 16 105/55 (50) 100   








Differential Diagnosis


Sickle cell crisis versus metabolic abnormality versus pneumonia versus ACS 

versus other


Narrative Course


Patient is a 22-year-old male presenting for evaluation of sickle cell crisis 

that started yesterday.  Patient's vital signs are stable.  Medical records 

reviewed.  Labs and imaging ordered and pending.


CBC with no acute findings, hemoglobin has improved when compared to prior 1 

week ago and is currently 8.3 up from 6.7


Reticulocyte count is 3.8, absolute reticulocyte count is 120.8, this is also 

improved considerably when compared to prior


Chemistry with no acute findings


Troponin is negative, chest x-ray shows no acute disease. 


Patient received 2 separate doses of Dilaudid 2 mg IV, 2 L of IV fluids.  





Patient is encouraged to follow-up with Dr. Glover on Monday, he was 

encouraged to increase oral fluid intake.  He was advised to return to 

emergency department for any new worsening symptoms.  Patient stable for 

discharge.





Diagnosis





 Primary Impression:  


 Sickle cell pain crisis


Referrals:  


Deanna Glover MD


2 days


Patient Instructions:  General Instructions, Narcotic given in the ED, Sickle 

Cell Crisis (ED)





***Additional Instructions:  


Follow-up with Dr. Glover on Monday


Increase oral fluid intake


Take medications as needed and as directed for pain


Return to emergency department for any new or worsening symptoms


***Med/Other Pt SpecificInfo:  Prescription(s) given


Scripts


Oxycodone-Acetaminophen (Percocet)  mg Tab


1 TAB PO Q6H Y for PAIN, #8 TAB 0 Refills


   Prov: Thais Rob         3/31/18


Disposition:  01 DISCHARGE HOME


Condition:  Stable











Thais Rbo Mar 31, 2018 11:16

## 2018-03-31 NOTE — PD
Physical Exam


Date Seen by Provider:  Mar 31, 2018


Narrative


This patient presents with sickle cell crisis.  He has been treated with some 

fluids and Dilaudid and reports that he is feeling better.  He also would like 

to express his appreciation to us for the treatment.





Data


Data


Last Documented VS





Vital Signs








  Date Time  Temp Pulse Resp B/P (MAP) Pulse Ox O2 Delivery O2 Flow Rate FiO2


 


3/31/18 11:11  57 18  99 Room Air  


 


3/31/18 10:55 97.7   105/55 (72)    








Orders





 Orders


C-Reactive Protein (Crp) (3/31/18 11:06)


Complete Blood Count With Diff (3/31/18 11:06)


Comprehensive Metabolic Panel (3/31/18 11:06)


Retic Count (3/31/18 11:06)


Chest, Single Ap (3/31/18 11:06)


Ecg Monitoring (3/31/18 11:06)


Iv Access Insert/Monitor (3/31/18 11:06)


Oximetry (3/31/18 11:06)


Hydromorphone Pf Inj (Dilaudid Pf Inj) (3/31/18 11:15)


Sodium Chloride 0.9% Flush (Ns Flush) (3/31/18 11:15)


Sodium Chlor 0.9% 1000 Ml Inj (Ns 1000 M (3/31/18 11:06)


Ondansetron Inj (Zofran Inj) (3/31/18 11:15)


Sodium Chlor 0.9% 1000 Ml Inj (Ns 1000 M (3/31/18 11:15)


Troponin I (3/31/18 11:12)


Ckmb (Isoenzyme) Profile (3/31/18 11:12)


Electrocardiogram (3/31/18 )


Hydromorphone Pf Inj (Dilaudid Pf Inj) (3/31/18 12:30)





Labs





Laboratory Tests








Test


  3/31/18


11:34


 


White Blood Count 11.3 TH/MM3 


 


Red Blood Count 3.15 MIL/MM3 


 


Hemoglobin 8.3 GM/DL 


 


Hematocrit 25.3 % 


 


Mean Corpuscular Volume 80.4 FL 


 


Mean Corpuscular Hemoglobin 26.5 PG 


 


Mean Corpuscular Hemoglobin


Concent 33.0 % 


 


 


Red Cell Distribution Width 15.5 % 


 


Platelet Count 216 TH/MM3 


 


Mean Platelet Volume 8.2 FL 


 


Neutrophils (%) (Auto) 71.3 % 


 


Lymphocytes (%) (Auto) 20.1 % 


 


Monocytes (%) (Auto) 5.0 % 


 


Eosinophils (%) (Auto) 3.2 % 


 


Basophils (%) (Auto) 0.4 % 


 


Neutrophils # (Auto) 8.1 TH/MM3 


 


Lymphocytes # (Auto) 2.3 TH/MM3 


 


Monocytes # (Auto) 0.6 TH/MM3 


 


Eosinophils # (Auto) 0.4 TH/MM3 


 


Basophils # (Auto) 0.0 TH/MM3 


 


CBC Comment AUTO DIFF 


 


Differential Comment


  AUTO DIFF


CONFIRMED


 


Platelet Estimate NORMAL 


 


Platelet Morphology Comment ENLARGED 


 


Polychromasia 2.4 % 


 


Sickle Cells 1+ 


 


Target Cells 1+ 


 


Baird-Wing Bodies PRESENT 


 


Reticulocyte Count 3.8 % 


 


Absolute Reticulocyte Count 120.8 MIL/L 


 


Blood Urea Nitrogen 11 MG/DL 


 


Creatinine 0.57 MG/DL 


 


Random Glucose 81 MG/DL 


 


Total Protein 8.0 GM/DL 


 


Albumin 4.7 GM/DL 


 


Calcium Level 9.0 MG/DL 


 


Alkaline Phosphatase 63 U/L 


 


Aspartate Amino Transf


(AST/SGOT) 37 U/L 


 


 


Alanine Aminotransferase


(ALT/SGPT) 20 U/L 


 


 


Total Bilirubin 2.3 MG/DL 


 


Sodium Level 142 MEQ/L 


 


Potassium Level 3.9 MEQ/L 


 


Chloride Level 111 MEQ/L 


 


Carbon Dioxide Level 24.2 MEQ/L 


 


Anion Gap 7 MEQ/L 


 


Estimat Glomerular Filtration


Rate 213 ML/MIN 


 


 


C-Reactive Protein 0.59 MG/DL 











MDM


Supervised Visit with EDA:  Yes


Narrative Course


I, Dr. urbano, have reviewed the advance practice practitioner's documentation 

and am in agreement, met with the patient face to face, made the diagnosis, and 

the medical decision making was done by me.  





*My assessment and Findings: This is a thin, chronically ill-appearing man who 

is lucid and does not appear to be in any acute distress at this point.





Please see Thais Ayala NP's  note for results of laboratory and radiographic 

evaluation, ED course, final diagnosis and disposition











Shannen Merrill MD Mar 31, 2018 12:41

## 2018-04-01 NOTE — EKG
Date Performed: 03/31/2018       Time Performed: 11:50:21

 

PTAGE:      22 years

 

EKG:      Sinus rhythm 

 

 NORMAL ECG

 

PREVIOUS TRACING       : 03/22/2018 15.57 Since the previous tracing, no significant change noted

 

DOCTOR:   Bright Morales  Interpretating Date/Time  04/01/2018 12:19:11

## 2018-05-09 ENCOUNTER — HOSPITAL ENCOUNTER (EMERGENCY)
Dept: HOSPITAL 17 - NEPC | Age: 23
Discharge: HOME | End: 2018-05-09
Payer: COMMERCIAL

## 2018-05-09 VITALS
RESPIRATION RATE: 16 BRPM | OXYGEN SATURATION: 99 % | HEART RATE: 89 BPM | SYSTOLIC BLOOD PRESSURE: 117 MMHG | TEMPERATURE: 97.8 F | DIASTOLIC BLOOD PRESSURE: 63 MMHG

## 2018-05-09 VITALS
RESPIRATION RATE: 18 BRPM | DIASTOLIC BLOOD PRESSURE: 60 MMHG | HEART RATE: 84 BPM | OXYGEN SATURATION: 98 % | SYSTOLIC BLOOD PRESSURE: 110 MMHG

## 2018-05-09 VITALS
RESPIRATION RATE: 21 BRPM | HEART RATE: 69 BPM | OXYGEN SATURATION: 97 % | DIASTOLIC BLOOD PRESSURE: 56 MMHG | SYSTOLIC BLOOD PRESSURE: 109 MMHG

## 2018-05-09 VITALS — WEIGHT: 132.28 LBS | BODY MASS INDEX: 20.76 KG/M2 | HEIGHT: 67 IN

## 2018-05-09 VITALS — OXYGEN SATURATION: 96 %

## 2018-05-09 VITALS
OXYGEN SATURATION: 96 % | TEMPERATURE: 97.8 F | SYSTOLIC BLOOD PRESSURE: 109 MMHG | RESPIRATION RATE: 16 BRPM | HEART RATE: 76 BPM | DIASTOLIC BLOOD PRESSURE: 56 MMHG

## 2018-05-09 DIAGNOSIS — D57.00: Primary | ICD-10-CM

## 2018-05-09 LAB
BASOPHILS # BLD AUTO: 0.1 TH/MM3 (ref 0–0.2)
BASOPHILS NFR BLD: 0.6 % (ref 0–2)
BUN SERPL-MCNC: 7 MG/DL (ref 7–18)
CALCIUM SERPL-MCNC: 8.7 MG/DL (ref 8.5–10.1)
CHLORIDE SERPL-SCNC: 110 MEQ/L (ref 98–107)
CREAT SERPL-MCNC: 0.48 MG/DL (ref 0.6–1.3)
EOSINOPHIL # BLD: 0.8 TH/MM3 (ref 0–0.4)
EOSINOPHIL NFR BLD: 6 % (ref 0–4)
ERYTHROCYTE [DISTWIDTH] IN BLOOD BY AUTOMATED COUNT: 16.6 % (ref 11.6–17.2)
GFR SERPLBLD BASED ON 1.73 SQ M-ARVRAT: 260 ML/MIN (ref 89–?)
GLUCOSE SERPL-MCNC: 91 MG/DL (ref 74–106)
HCO3 BLD-SCNC: 22.9 MEQ/L (ref 21–32)
HCT VFR BLD CALC: 21.2 % (ref 39–51)
HGB BLD-MCNC: 7.1 GM/DL (ref 13–17)
HGB S BLD QL SOLY: (no result)
HOWELL-JOLLY BOD BLD QL SMEAR: PRESENT
LYMPHOCYTES # BLD AUTO: 4.1 TH/MM3 (ref 1–4.8)
LYMPHOCYTES NFR BLD AUTO: 29.4 % (ref 9–44)
LYMPHOCYTES: 37 % (ref 9–44)
MCH RBC QN AUTO: 26.5 PG (ref 27–34)
MCHC RBC AUTO-ENTMCNC: 33.4 % (ref 32–36)
MCV RBC AUTO: 79.4 FL (ref 80–100)
MONOCYTE #: 1 TH/MM3 (ref 0–0.9)
MONOCYTES NFR BLD: 7.2 % (ref 0–8)
MONOCYTES: 5 % (ref 0–8)
NEUTROPHILS # BLD AUTO: 8 TH/MM3 (ref 1.8–7.7)
NEUTROPHILS NFR BLD AUTO: 56.8 % (ref 16–70)
NEUTS BAND # BLD MANUAL: 7.7 TH/MM3 (ref 1.8–7.7)
NEUTS SEG NFR BLD MANUAL: 55 % (ref 16–70)
NUCLEATED RED BLOOD CELL: 4 (ref 0–0)
PLATELET # BLD: 221 TH/MM3 (ref 150–450)
PMV BLD AUTO: 7.9 FL (ref 7–11)
POLYCHROMASIA BLD QL SMEAR: 2 % (ref 0–1.9)
RBC # BLD AUTO: 2.67 MIL/MM3 (ref 4.5–5.9)
RETIC #: 295 MIL/L (ref 20–150)
RETICS/RBC NFR: 11.1 % (ref 0.4–3)
SODIUM SERPL-SCNC: 142 MEQ/L (ref 136–145)
TARGETS BLD QL SMEAR: (no result)
WBC # BLD AUTO: 14 TH/MM3 (ref 4–11)
WBC NRBC COR # BLD: 4 /100 WBC (ref 0–0)

## 2018-05-09 PROCEDURE — 85007 BL SMEAR W/DIFF WBC COUNT: CPT

## 2018-05-09 PROCEDURE — 96374 THER/PROPH/DIAG INJ IV PUSH: CPT

## 2018-05-09 PROCEDURE — 83615 LACTATE (LD) (LDH) ENZYME: CPT

## 2018-05-09 PROCEDURE — 93005 ELECTROCARDIOGRAM TRACING: CPT

## 2018-05-09 PROCEDURE — 99285 EMERGENCY DEPT VISIT HI MDM: CPT

## 2018-05-09 PROCEDURE — 85044 MANUAL RETICULOCYTE COUNT: CPT

## 2018-05-09 PROCEDURE — 71046 X-RAY EXAM CHEST 2 VIEWS: CPT

## 2018-05-09 PROCEDURE — 80048 BASIC METABOLIC PNL TOTAL CA: CPT

## 2018-05-09 PROCEDURE — 96375 TX/PRO/DX INJ NEW DRUG ADDON: CPT

## 2018-05-09 PROCEDURE — 96376 TX/PRO/DX INJ SAME DRUG ADON: CPT

## 2018-05-09 PROCEDURE — 96361 HYDRATE IV INFUSION ADD-ON: CPT

## 2018-05-09 PROCEDURE — 85027 COMPLETE CBC AUTOMATED: CPT

## 2018-05-09 PROCEDURE — 86140 C-REACTIVE PROTEIN: CPT

## 2018-05-09 NOTE — PD
HPI


Chief Complaint:  Sickle Cell


Time Seen by Provider:  05:42


Travel History


International Travel<30 days:  No


Contact w/Intl Traveler<30days:  No


Traveled to known affect area:  No





History of Present Illness


HPI


Is a 22-year-old male with a history of sickle cell disease and G6PD deficiency 

presents emergency department for evaluation of sickle cell pain crisis.  

Patient states pain is 9 out of 10, his normal sickle cell crisis which 

includes bilateral lower extremity pain low back pain.  Patient denies any 

fevers, also endorses mild chest pain without shortness of breath which is also 

normal for his sickle cell crisis for him.  States pain is been going on for 

the past 3 days, unrelieved with home narcotics, he followed up with his 

hematologist and had infusions of fluid and pain medication and states he still 

having significant pain.





[facility]


Past Medical History


Anemia:  Yes (SICKLE CELL)


Blood Disorders:  Yes ( G6PD DEFICIENCY)


Chest Pain:  Yes


Diminished Hearing:  No


Gastrointestinal Disorders:  Yes (gall stones)


Genetic Disorder:  Yes (SICKLE CELL, G6PD DEFICIENCY)


Immune Disorder:  Yes (SICKLE CELL)


Psychiatric:  Yes


Immunizations Current:  Yes


Pneumonia:  Yes


Sickle Cell Disease:  Yes


Tetanus Vaccination:  Unknown


Influenza Vaccination:  No





Past Surgical History


Oral Surgery:  Yes (dental surgery "AS A SMALL CHILD")


Other Surgery:  Yes (PICC LINE /picc line removal)





Social History


Alcohol Use:  No


Tobacco Use:  No


Substance Use:  No





Allergies-Medications


(Allergen,Severity, Reaction):  


Coded Allergies:  


     Sulfa (Sulfonamide Antibiotics) (Unverified  Allergy, Intermediate, itching

, 5/9/18)


     morphine (Unverified  Adverse Reaction, Mild, itching, 5/9/18)


 states it doesn't work and causes him to itch


Uncoded Allergies:  


     LEONARD BEANS (Allergy, Intermediate, 10/10/14)


Reported Meds & Prescriptions





Reported Meds & Active Scripts


Active


Percocet (Oxycodone-Acetaminophen)  mg Tab 1 Tab PO Q6H PRN


Augmentin (Amoxicillin-Clavulanate) 875-125 Mg Tab 1 Tab PO BID


Folic Acid 1 Mg Tablet 1 Mg PO DAILY


Hydrea (Hydroxyurea) 500 Mg Cap 500 Mg PO DAILY








Review of Systems


Except as stated in HPI:  all other systems reviewed are Neg





Physical Exam


Narrative


GENERAL: Well-developed well-nourished, sitting sideways in a stretcher rocking 

back and forth appears to be in moderate discomfort


SKIN: Focused skin assessment warm/dry.


HEAD: Atraumatic. Normocephalic. 


EYES: Pupils equal and round. No scleral icterus. No injection or drainage. 


ENT: No nasal bleeding or discharge.  Mucous membranes pink and moist.


NECK: Trachea midline. No JVD. 


CARDIOVASCULAR: Regular rate and rhythm.  No murmur appreciated.  No murmurs 

gallops or rub


RESPIRATORY: No accessory muscle use. Clear to auscultation. Breath sounds 

equal bilaterally.  No wheezes rales rhonchi


GASTROINTESTINAL: Abdomen soft, non-tender, nondistended. Hepatic and splenic 

margins not palpable. 


MUSCULOSKELETAL: No obvious deformities. No clubbing.  No cyanosis.  No edema.  

Extremities are atraumatic, no bony tenderness.  Pulse motor and sensory intact 

distally in all 4 extremities, compartments are soft.


NEUROLOGICAL: Awake and alert. No obvious cranial nerve deficits.  Motor 

grossly within normal limits. Normal speech.


PSYCHIATRIC: Appropriate mood and affect; insight and judgment normal.





Data


Data


Last Documented VS





Vital Signs








  Date Time  Temp Pulse Resp B/P (MAP) Pulse Ox O2 Delivery O2 Flow Rate FiO2


 


5/9/18 08:01  69 21 109/56 (73) 97 Room Air  


 


5/9/18 06:00 97.8       








Orders





 Orders


C-Reactive Protein (Crp) (5/9/18 05:42)


Complete Blood Count With Diff (5/9/18 05:42)


Retic Count (5/9/18 05:42)


Chest, Pa & Lat (5/9/18 05:42)


Ecg Monitoring (5/9/18 05:42)


Iv Access Insert/Monitor (5/9/18 05:42)


Oximetry (5/9/18 05:42)


Hydromorphone Pf Inj (Dilaudid Pf Inj) (5/9/18 05:45)


Ketorolac Inj (Toradol Inj) (5/9/18 05:45)


Sodium Chloride 0.9% Flush (Ns Flush) (5/9/18 05:45)


Sodium Chlor 0.9% 1000 Ml Inj (Ns 1000 M (5/9/18 05:42)


Diphenhydramine Inj (Benadryl Inj) (5/9/18 05:45)


Electrocardiogram (5/9/18 )


Hydromorphone Pf Inj (Dilaudid Pf Inj) (5/9/18 07:15)


Sodium Chlor 0.9% 1000 Ml Inj (Ns 1000 M (5/9/18 08:00)


Basic Metabolic Panel (Bmp) (5/9/18 07:54)


Ldh Serum (5/9/18 07:54)





Labs





Laboratory Tests








Test


  5/9/18


06:05


 


White Blood Count 14.0 TH/MM3 


 


Red Blood Count 2.67 MIL/MM3 


 


Hemoglobin 7.1 GM/DL 


 


Hematocrit 21.2 % 


 


Mean Corpuscular Volume 79.4 FL 


 


Mean Corpuscular Hemoglobin 26.5 PG 


 


Mean Corpuscular Hemoglobin


Concent 33.4 % 


 


 


Red Cell Distribution Width 16.6 % 


 


Platelet Count 221 TH/MM3 


 


Mean Platelet Volume 7.9 FL 


 


Neutrophils (%) (Auto) 56.8 % 


 


Lymphocytes (%) (Auto) 29.4 % 


 


Monocytes (%) (Auto) 7.2 % 


 


Eosinophils (%) (Auto) 6.0 % 


 


Basophils (%) (Auto) 0.6 % 


 


Neutrophils # (Auto) 8.0 TH/MM3 


 


Lymphocytes # (Auto) 4.1 TH/MM3 


 


Monocytes # (Auto) 1.0 TH/MM3 


 


Eosinophils # (Auto) 0.8 TH/MM3 


 


Basophils # (Auto) 0.1 TH/MM3 


 


CBC Comment AUTO DIFF 


 


Differential Total Cells


Counted 100 


 


 


Neutrophils % (Manual) 55 % 


 


Lymphocytes % 37 % 


 


Monocytes % 5 % 


 


Eosinophils % 3 % 


 


Neutrophils # (Manual) 7.7 TH/MM3 


 


Nucleated Red Blood Cells 4 /100 WBC 


 


Differential Comment


  FINAL DIFF


MANUAL


 


Platelet Estimate NORMAL 


 


Platelet Morphology Comment NORMAL 


 


Polychromasia 2.0 % 


 


Sickle Cells 1+ 


 


Target Cells 1+ 


 


Baird-Bowlegs Bodies PRESENT 


 


Red Cell Morphology Comment  


 


Reticulocyte Count 11.1 % 


 


Absolute Reticulocyte Count 295.0 MIL/L 


 


C-Reactive Protein 1.00 MG/DL 











[facility]


Medical Decision Making


Medical Screen Exam Complete:  Yes


Emergency Medical Condition:  Yes


Differential Diagnosis


Sickle cell pain crisis, occlusive crisis, sequestration unlikely, hemolytic 

crisis unlikely, acute chest syndrome unlikely


Narrative Course


Patient roomed in the ER, dilaudid 2mg IV given decreased pain from 9/10 to 7/

10.  Still appears fairly uncomfortable.  Patient labs do show moderate anemia 

otherwise reassuring.  CXR negative, ekg negative.  Patient revisited and still 

appears uncomfortable ans rocking back and forth in pain.  Additional dilaudid 

ordered. Discussed with Dr. Lightburn at 0700 to reassess patient and 

disposition appropriately.











Roger Vergara MD May 9, 2018 05:43

## 2018-05-09 NOTE — RADRPT
EXAM DATE/TIME:  05/09/2018 05:54 

 

[facility] COMPARISON:     

CHEST PA & LAT, March 23, 2018, 8:23.

 

                     

INDICATIONS :     

Short of breath.

                     

 

MEDICAL HISTORY :     

Sickle Cell disease.          

 

SURGICAL HISTORY :     

None.   

 

ENCOUNTER:     

Initial                                        

 

ACUITY:     

1 day      

 

PAIN SCORE:     

4/10

 

LOCATION:     

Bilateral  chest

 

FINDINGS:     

PA and lateral views of the chest demonstrate the lungs to be symmetrically aerated without evidence 
of mass, infiltrate or effusion.  The cardiomediastinal contours are unremarkable.  Osseous structure
s are intact.

 

CONCLUSION:     Normal examination.  

 

 

 

 Fadi Dixon MD on May 09, 2018 at 6:04           

Board Certified Radiologist.

 This report was verified electronically.

## 2018-05-09 NOTE — PD
Physical Exam


Narrative


GENERAL: 


SKIN: Warm and dry.


HEAD: Atraumatic. Normocephalic. 


EYES: Pupils equal and round. No scleral icterus. No injection or drainage. 


ENT: No nasal bleeding or discharge.  Mucous membranes pink and moist.


NECK: Trachea midline. No JVD. 


CARDIOVASCULAR: Regular rate and rhythm.  


RESPIRATORY: No accessory muscle use. Clear to auscultation. Breath sounds 

equal bilaterally. 


GASTROINTESTINAL: Abdomen soft, non-tender, nondistended. 


MUSCULOSKELETAL: Extremities without clubbing, cyanosis, or edema. No obvious 

deformities. 


NEUROLOGICAL: Awake and alert. No obvious cranial nerve deficits.  Motor 

grossly within normal limits. Five out of 5 muscle strength in the arms and 

legs.  Normal speech.


PSYCHIATRIC: Appropriate mood and affect; insight and judgment normal.





Data


Data


Last Documented VS





Vital Signs








  Date Time  Temp Pulse Resp B/P (MAP) Pulse Ox O2 Delivery O2 Flow Rate FiO2


 


5/9/18 10:10        


 


5/9/18 10:00  84 18  98 Room Air  


 


5/9/18 06:00 97.8       








Orders





 Orders


C-Reactive Protein (Crp) (5/9/18 05:42)


Complete Blood Count With Diff (5/9/18 05:42)


Retic Count (5/9/18 05:42)


Chest, Pa & Lat (5/9/18 05:42)


Ecg Monitoring (5/9/18 05:42)


Iv Access Insert/Monitor (5/9/18 05:42)


Oximetry (5/9/18 05:42)


Hydromorphone Pf Inj (Dilaudid Pf Inj) (5/9/18 05:45)


Ketorolac Inj (Toradol Inj) (5/9/18 05:45)


Sodium Chloride 0.9% Flush (Ns Flush) (5/9/18 05:45)


Sodium Chlor 0.9% 1000 Ml Inj (Ns 1000 M (5/9/18 05:42)


Diphenhydramine Inj (Benadryl Inj) (5/9/18 05:45)


Electrocardiogram (5/9/18 )


Hydromorphone Pf Inj (Dilaudid Pf Inj) (5/9/18 07:15)


Sodium Chlor 0.9% 1000 Ml Inj (Ns 1000 M (5/9/18 08:00)


Basic Metabolic Panel (Bmp) (5/9/18 07:54)


Ldh Serum (5/9/18 07:54)


Ed Discharge Order (5/9/18 09:31)





Labs





Laboratory Tests








Test


  5/9/18


06:05


 


White Blood Count 14.0 TH/MM3 


 


Red Blood Count 2.67 MIL/MM3 


 


Hemoglobin 7.1 GM/DL 


 


Hematocrit 21.2 % 


 


Mean Corpuscular Volume 79.4 FL 


 


Mean Corpuscular Hemoglobin 26.5 PG 


 


Mean Corpuscular Hemoglobin


Concent 33.4 % 


 


 


Red Cell Distribution Width 16.6 % 


 


Platelet Count 221 TH/MM3 


 


Mean Platelet Volume 7.9 FL 


 


Neutrophils (%) (Auto) 56.8 % 


 


Lymphocytes (%) (Auto) 29.4 % 


 


Monocytes (%) (Auto) 7.2 % 


 


Eosinophils (%) (Auto) 6.0 % 


 


Basophils (%) (Auto) 0.6 % 


 


Neutrophils # (Auto) 8.0 TH/MM3 


 


Lymphocytes # (Auto) 4.1 TH/MM3 


 


Monocytes # (Auto) 1.0 TH/MM3 


 


Eosinophils # (Auto) 0.8 TH/MM3 


 


Basophils # (Auto) 0.1 TH/MM3 


 


CBC Comment AUTO DIFF 


 


Differential Total Cells


Counted 100 


 


 


Neutrophils % (Manual) 55 % 


 


Lymphocytes % 37 % 


 


Monocytes % 5 % 


 


Eosinophils % 3 % 


 


Neutrophils # (Manual) 7.7 TH/MM3 


 


Nucleated Red Blood Cells 4 /100 WBC 


 


Differential Comment


  FINAL DIFF


MANUAL


 


Platelet Estimate NORMAL 


 


Platelet Morphology Comment NORMAL 


 


Polychromasia 2.0 % 


 


Sickle Cells 1+ 


 


Target Cells 1+ 


 


Baird-Bostic Bodies PRESENT 


 


Red Cell Morphology Comment  


 


Reticulocyte Count 11.1 % 


 


Absolute Reticulocyte Count 295.0 MIL/L 


 


Blood Urea Nitrogen 7 MG/DL 


 


Creatinine 0.48 MG/DL 


 


Random Glucose 91 MG/DL 


 


Calcium Level 8.7 MG/DL 


 


Lactate Dehydrogenase 425 U/L 


 


Sodium Level 142 MEQ/L 


 


Potassium Level 3.8 MEQ/L 


 


Chloride Level 110 MEQ/L 


 


Carbon Dioxide Level 22.9 MEQ/L 


 


Anion Gap 9 MEQ/L 


 


Estimat Glomerular Filtration


Rate 260 ML/MIN 


 


 


C-Reactive Protein 1.00 MG/DL 











[facility]


Medical Record Reviewed:  Yes


Supervised Visit with EDA:  No


Differential Diagnosis


Chest syndrome versus sickle cell crisis versus pneumonia versus renal failure 

versus electrolyte abnormality


Narrative Course


Chest x-ray read as negative for any infiltrate or other findings consistent 

with chest syndrome as per radiologist


CBC shows WBC of 14, H&H of 7/21, platelet count normal at 221, and no left 

shift


C-reactive protein of 1


LDH equals 425


Electrolytes are all within normal limits including kidney function








Patient is more comfortable, although still in pain is not as severe as it was 

before.  Patient feels comfortable going home and following up with Dr. Glover for further care and evaluation of the clinic.


CONTACTED DR GLOVER'S office who recommended that we send the patient today, 

and they are expecting him.


Diagnosis





 Primary Impression:  


 Sickle cell disease with crisis


Referrals:  


Deanna Glover MD


Patient Instructions:  Sickle Cell Crisis (ED)


Disposition:  01 DISCHARGE HOME


Condition:  Stable











Howie Macias MD May 9, 2018 08:54

## 2018-05-25 ENCOUNTER — HOSPITAL ENCOUNTER (EMERGENCY)
Dept: HOSPITAL 17 - NEPE | Age: 23
Discharge: HOME | End: 2018-05-25
Payer: COMMERCIAL

## 2018-05-25 VITALS — WEIGHT: 132.28 LBS | BODY MASS INDEX: 20.76 KG/M2 | HEIGHT: 67 IN

## 2018-05-25 VITALS
RESPIRATION RATE: 16 BRPM | SYSTOLIC BLOOD PRESSURE: 105 MMHG | TEMPERATURE: 98.3 F | OXYGEN SATURATION: 100 % | HEART RATE: 66 BPM | DIASTOLIC BLOOD PRESSURE: 54 MMHG

## 2018-05-25 VITALS
HEART RATE: 68 BPM | SYSTOLIC BLOOD PRESSURE: 130 MMHG | DIASTOLIC BLOOD PRESSURE: 61 MMHG | RESPIRATION RATE: 18 BRPM | OXYGEN SATURATION: 99 %

## 2018-05-25 VITALS
HEART RATE: 68 BPM | DIASTOLIC BLOOD PRESSURE: 53 MMHG | OXYGEN SATURATION: 97 % | SYSTOLIC BLOOD PRESSURE: 104 MMHG | RESPIRATION RATE: 22 BRPM

## 2018-05-25 DIAGNOSIS — D57.00: Primary | ICD-10-CM

## 2018-05-25 LAB
BASOPHILS # BLD AUTO: 0.1 TH/MM3 (ref 0–0.2)
BASOPHILS NFR BLD: 0.7 % (ref 0–2)
BUN SERPL-MCNC: 8 MG/DL (ref 7–18)
CALCIUM SERPL-MCNC: 8.7 MG/DL (ref 8.5–10.1)
CHLORIDE SERPL-SCNC: 109 MEQ/L (ref 98–107)
CREAT SERPL-MCNC: 0.54 MG/DL (ref 0.6–1.3)
EOSINOPHIL # BLD: 0.7 TH/MM3 (ref 0–0.4)
EOSINOPHIL NFR BLD: 5.5 % (ref 0–4)
ERYTHROCYTE [DISTWIDTH] IN BLOOD BY AUTOMATED COUNT: 17.4 % (ref 11.6–17.2)
GFR SERPLBLD BASED ON 1.73 SQ M-ARVRAT: 227 ML/MIN (ref 89–?)
GLUCOSE SERPL-MCNC: 91 MG/DL (ref 74–106)
HCO3 BLD-SCNC: 24.3 MEQ/L (ref 21–32)
HCT VFR BLD CALC: 21.5 % (ref 39–51)
HGB BLD-MCNC: 6.9 GM/DL (ref 13–17)
LYMPHOCYTES # BLD AUTO: 3.4 TH/MM3 (ref 1–4.8)
LYMPHOCYTES NFR BLD AUTO: 28 % (ref 9–44)
LYMPHOCYTES: 39 % (ref 9–44)
MCH RBC QN AUTO: 25.8 PG (ref 27–34)
MCHC RBC AUTO-ENTMCNC: 32.4 % (ref 32–36)
MCV RBC AUTO: 79.9 FL (ref 80–100)
MONOCYTE #: 0.9 TH/MM3 (ref 0–0.9)
MONOCYTES NFR BLD: 7.4 % (ref 0–8)
MONOCYTES: 2 % (ref 0–8)
NEUTROPHILS # BLD AUTO: 7.1 TH/MM3 (ref 1.8–7.7)
NEUTROPHILS NFR BLD AUTO: 58.4 % (ref 16–70)
NEUTS BAND # BLD MANUAL: 6.7 TH/MM3 (ref 1.8–7.7)
NEUTS SEG NFR BLD MANUAL: 55 % (ref 16–70)
NUCLEATED RED BLOOD CELL: 7 (ref 0–0)
OVALOCYTES BLD QL SMEAR: (no result)
PLATELET # BLD: 240 TH/MM3 (ref 150–450)
PMV BLD AUTO: 8.3 FL (ref 7–11)
POLYCHROMASIA BLD QL SMEAR: 4.3 % (ref 0–1.9)
RBC # BLD AUTO: 2.69 MIL/MM3 (ref 4.5–5.9)
RETIC #: 351.2 MIL/L (ref 20–150)
RETICS/RBC NFR: 13.1 % (ref 0.4–3)
SODIUM SERPL-SCNC: 142 MEQ/L (ref 136–145)
TARGETS BLD QL SMEAR: (no result)
WBC # BLD AUTO: 12.2 TH/MM3 (ref 4–11)
WBC NRBC COR # BLD: 7 /100 WBC (ref 0–0)

## 2018-05-25 PROCEDURE — 85007 BL SMEAR W/DIFF WBC COUNT: CPT

## 2018-05-25 PROCEDURE — 96376 TX/PRO/DX INJ SAME DRUG ADON: CPT

## 2018-05-25 PROCEDURE — 85044 MANUAL RETICULOCYTE COUNT: CPT

## 2018-05-25 PROCEDURE — 96374 THER/PROPH/DIAG INJ IV PUSH: CPT

## 2018-05-25 PROCEDURE — 80048 BASIC METABOLIC PNL TOTAL CA: CPT

## 2018-05-25 PROCEDURE — 85027 COMPLETE CBC AUTOMATED: CPT

## 2018-05-25 PROCEDURE — 99284 EMERGENCY DEPT VISIT MOD MDM: CPT

## 2018-05-25 PROCEDURE — 96361 HYDRATE IV INFUSION ADD-ON: CPT

## 2018-05-25 PROCEDURE — 96375 TX/PRO/DX INJ NEW DRUG ADDON: CPT

## 2018-06-02 ENCOUNTER — HOSPITAL ENCOUNTER (OUTPATIENT)
Dept: HOSPITAL 17 - NEPE | Age: 23
Setting detail: OBSERVATION
Discharge: HOME | End: 2018-06-02
Attending: FAMILY MEDICINE | Admitting: FAMILY MEDICINE
Payer: MEDICAID

## 2018-06-02 VITALS
RESPIRATION RATE: 18 BRPM | DIASTOLIC BLOOD PRESSURE: 71 MMHG | HEART RATE: 79 BPM | OXYGEN SATURATION: 96 % | TEMPERATURE: 97.9 F | SYSTOLIC BLOOD PRESSURE: 112 MMHG

## 2018-06-02 VITALS
TEMPERATURE: 98.5 F | SYSTOLIC BLOOD PRESSURE: 118 MMHG | RESPIRATION RATE: 16 BRPM | DIASTOLIC BLOOD PRESSURE: 67 MMHG | HEART RATE: 69 BPM | OXYGEN SATURATION: 98 %

## 2018-06-02 VITALS
OXYGEN SATURATION: 97 % | SYSTOLIC BLOOD PRESSURE: 147 MMHG | RESPIRATION RATE: 18 BRPM | DIASTOLIC BLOOD PRESSURE: 75 MMHG | TEMPERATURE: 97.6 F | HEART RATE: 99 BPM

## 2018-06-02 VITALS
OXYGEN SATURATION: 100 % | DIASTOLIC BLOOD PRESSURE: 61 MMHG | RESPIRATION RATE: 16 BRPM | TEMPERATURE: 98.1 F | HEART RATE: 65 BPM | SYSTOLIC BLOOD PRESSURE: 126 MMHG

## 2018-06-02 VITALS
RESPIRATION RATE: 13 BRPM | OXYGEN SATURATION: 100 % | DIASTOLIC BLOOD PRESSURE: 75 MMHG | TEMPERATURE: 98.1 F | HEART RATE: 85 BPM | SYSTOLIC BLOOD PRESSURE: 131 MMHG

## 2018-06-02 VITALS
HEART RATE: 97 BPM | RESPIRATION RATE: 15 BRPM | OXYGEN SATURATION: 98 % | SYSTOLIC BLOOD PRESSURE: 145 MMHG | DIASTOLIC BLOOD PRESSURE: 79 MMHG | TEMPERATURE: 98 F

## 2018-06-02 VITALS — RESPIRATION RATE: 18 BRPM

## 2018-06-02 VITALS — WEIGHT: 132.28 LBS | HEIGHT: 67 IN | BODY MASS INDEX: 20.76 KG/M2

## 2018-06-02 VITALS — DIASTOLIC BLOOD PRESSURE: 78 MMHG | SYSTOLIC BLOOD PRESSURE: 122 MMHG | TEMPERATURE: 97.8 F

## 2018-06-02 VITALS — OXYGEN SATURATION: 96 %

## 2018-06-02 DIAGNOSIS — D57.00: Primary | ICD-10-CM

## 2018-06-02 DIAGNOSIS — E74.01: ICD-10-CM

## 2018-06-02 DIAGNOSIS — R07.89: ICD-10-CM

## 2018-06-02 DIAGNOSIS — M25.512: ICD-10-CM

## 2018-06-02 DIAGNOSIS — R11.0: ICD-10-CM

## 2018-06-02 DIAGNOSIS — M25.511: ICD-10-CM

## 2018-06-02 DIAGNOSIS — M54.9: ICD-10-CM

## 2018-06-02 DIAGNOSIS — D72.828: ICD-10-CM

## 2018-06-02 LAB
ALBUMIN SERPL-MCNC: 4.4 GM/DL (ref 3.4–5)
ALP SERPL-CCNC: 61 U/L (ref 45–117)
ALT SERPL-CCNC: 18 U/L (ref 12–78)
AST SERPL-CCNC: 48 U/L (ref 15–37)
BASOPHILS # BLD AUTO: 0.1 TH/MM3 (ref 0–0.2)
BASOPHILS NFR BLD: 1 % (ref 0–2)
BILIRUB SERPL-MCNC: 2.7 MG/DL (ref 0.2–1)
BUN SERPL-MCNC: 8 MG/DL (ref 7–18)
CALCIUM SERPL-MCNC: 8.7 MG/DL (ref 8.5–10.1)
CHLORIDE SERPL-SCNC: 110 MEQ/L (ref 98–107)
CREAT SERPL-MCNC: 0.7 MG/DL (ref 0.6–1.3)
EOSINOPHIL # BLD: 0.6 TH/MM3 (ref 0–0.4)
EOSINOPHIL NFR BLD: 4.5 % (ref 0–4)
ERYTHROCYTE [DISTWIDTH] IN BLOOD BY AUTOMATED COUNT: 17.5 % (ref 11.6–17.2)
GFR SERPLBLD BASED ON 1.73 SQ M-ARVRAT: 168 ML/MIN (ref 89–?)
GLUCOSE SERPL-MCNC: 94 MG/DL (ref 74–106)
HCO3 BLD-SCNC: 22 MEQ/L (ref 21–32)
HCT VFR BLD CALC: 22.5 % (ref 39–51)
HGB BLD-MCNC: 7.5 GM/DL (ref 13–17)
HGB S BLD QL SOLY: (no result)
HOWELL-JOLLY BOD BLD QL SMEAR: PRESENT
INR PPP: 1.1 RATIO
LYMPHOCYTES # BLD AUTO: 3.7 TH/MM3 (ref 1–4.8)
LYMPHOCYTES NFR BLD AUTO: 26.8 % (ref 9–44)
LYMPHOCYTES: 41 % (ref 9–44)
MCH RBC QN AUTO: 26.5 PG (ref 27–34)
MCHC RBC AUTO-ENTMCNC: 33.5 % (ref 32–36)
MCV RBC AUTO: 79.1 FL (ref 80–100)
MONOCYTE #: 0.8 TH/MM3 (ref 0–0.9)
MONOCYTES NFR BLD: 6 % (ref 0–8)
MONOCYTES: 1 % (ref 0–8)
NEUTROPHILS # BLD AUTO: 8.5 TH/MM3 (ref 1.8–7.7)
NEUTROPHILS NFR BLD AUTO: 61.7 % (ref 16–70)
NEUTS BAND # BLD MANUAL: 7.4 TH/MM3 (ref 1.8–7.7)
NEUTS BAND NFR BLD: 5 % (ref 0–6)
NEUTS SEG NFR BLD MANUAL: 49 % (ref 16–70)
NUCLEATED RED BLOOD CELL: 4 (ref 0–0)
PLATELET # BLD: 251 TH/MM3 (ref 150–450)
PMV BLD AUTO: 8 FL (ref 7–11)
POLYCHROMASIA BLD QL SMEAR: 2.2 % (ref 0–1.9)
PROT SERPL-MCNC: 7.2 GM/DL (ref 6.4–8.2)
PROTHROMBIN TIME: 11.6 SEC (ref 9.8–11.6)
RBC # BLD AUTO: 2.84 MIL/MM3 (ref 4.5–5.9)
RETIC #: 232 MIL/L (ref 20–150)
RETICS/RBC NFR: 8.2 % (ref 0.4–3)
SODIUM SERPL-SCNC: 141 MEQ/L (ref 136–145)
TARGETS BLD QL SMEAR: (no result)
WBC # BLD AUTO: 13.7 TH/MM3 (ref 4–11)
WBC NRBC COR # BLD: 4 /100 WBC (ref 0–0)

## 2018-06-02 PROCEDURE — 85044 MANUAL RETICULOCYTE COUNT: CPT

## 2018-06-02 PROCEDURE — 96374 THER/PROPH/DIAG INJ IV PUSH: CPT

## 2018-06-02 PROCEDURE — 96375 TX/PRO/DX INJ NEW DRUG ADDON: CPT

## 2018-06-02 PROCEDURE — 85027 COMPLETE CBC AUTOMATED: CPT

## 2018-06-02 PROCEDURE — 80053 COMPREHEN METABOLIC PANEL: CPT

## 2018-06-02 PROCEDURE — 96361 HYDRATE IV INFUSION ADD-ON: CPT

## 2018-06-02 PROCEDURE — 85610 PROTHROMBIN TIME: CPT

## 2018-06-02 PROCEDURE — 85730 THROMBOPLASTIN TIME PARTIAL: CPT

## 2018-06-02 PROCEDURE — 71045 X-RAY EXAM CHEST 1 VIEW: CPT

## 2018-06-02 PROCEDURE — 85007 BL SMEAR W/DIFF WBC COUNT: CPT

## 2018-06-02 PROCEDURE — 83615 LACTATE (LD) (LDH) ENZYME: CPT

## 2018-06-02 PROCEDURE — 86900 BLOOD TYPING SEROLOGIC ABO: CPT

## 2018-06-02 PROCEDURE — 83690 ASSAY OF LIPASE: CPT

## 2018-06-02 PROCEDURE — 86850 RBC ANTIBODY SCREEN: CPT

## 2018-06-02 PROCEDURE — 99285 EMERGENCY DEPT VISIT HI MDM: CPT

## 2018-06-02 PROCEDURE — 96376 TX/PRO/DX INJ SAME DRUG ADON: CPT

## 2018-06-02 PROCEDURE — G0378 HOSPITAL OBSERVATION PER HR: HCPCS

## 2018-06-02 PROCEDURE — 86901 BLOOD TYPING SEROLOGIC RH(D): CPT

## 2018-06-02 RX ADMIN — MAGNESIUM SULFATE HEPTAHYDRATE SCH MLS/HR: 500 INJECTION, SOLUTION INTRAMUSCULAR; INTRAVENOUS at 11:22

## 2018-06-02 RX ADMIN — HYDROMORPHONE HYDROCHLORIDE PRN MG: 1 INJECTION, SOLUTION INTRAMUSCULAR; INTRAVENOUS; SUBCUTANEOUS at 20:00

## 2018-06-02 RX ADMIN — MAGNESIUM SULFATE HEPTAHYDRATE SCH MLS/HR: 500 INJECTION, SOLUTION INTRAMUSCULAR; INTRAVENOUS at 19:53

## 2018-06-02 RX ADMIN — HYDROMORPHONE HYDROCHLORIDE PRN MG: 1 INJECTION, SOLUTION INTRAMUSCULAR; INTRAVENOUS; SUBCUTANEOUS at 20:30

## 2018-06-02 NOTE — PD
HPI


Chief Complaint:  Sickle Cell


Time Seen by Provider:  08:30


Travel History


International Travel<30 days:  No


Contact w/Intl Traveler<30days:  No


Traveled to known affect area:  No





History of Present Illness


HPI


Presents stating that he started having a an acute episode of his pain crisis 

sickle cell, onset at 4 AM this morning.  Chest and back pain is what he is 

complaining off which is very typical of his sickle cell pain crisis, they have 

been ongoing quite frequently, he is to follow up with Dr. Glover, however 

since he graduated from [facility] and was planning on moving back to [place] 

he no longer has follow-up appointments.  Patient comes in rates the pain 10 

out of 10, associated nausea but without any vomiting or diarrhea.  Patient 

denies any shortness of breath, cough/sore throat/runny nose or fever.








Allergies to britney beans, sulfa and morphine


Past medical history significant for sickle cell, cholecystectomy, circumcision

, G6PD deficiency,





PFSH


Past Medical History


Anemia:  Yes (SICKLE CELL)


Blood Disorders:  Yes ( G6PD DEFICIENCY)


Chest Pain:  Yes


Diminished Hearing:  No


Gastrointestinal Disorders:  Yes (gall stones)


Genetic Disorder:  Yes (SICKLE CELL, G6PD DEFICIENCY)


Immune Disorder:  Yes (SICKLE CELL)


Psychiatric:  Yes


Immunizations Current:  Yes


Pneumonia:  Yes


Sickle Cell Disease:  Yes





Past Surgical History


Hysterectomy:  No


Oral Surgery:  Yes (dental surgery "AS A SMALL CHILD")


Other Surgery:  Yes (PICC LINE /picc line removal)





Social History


Alcohol Use:  No


Tobacco Use:  No


Substance Use:  No





Allergies-Medications


(Allergen,Severity, Reaction):  


Coded Allergies:  


     Sulfa (Sulfonamide Antibiotics) (Verified  Allergy, Intermediate, itching, 

6/2/18)


     morphine (Verified  Allergy, Mild, itching, 6/2/18)


Uncoded Allergies:  


     BRITNEY BEANS (Allergy, Intermediate, 10/10/14)


Reported Meds & Prescriptions





Reported Meds & Active Scripts


Active


Percocet (Oxycodone-Acetaminophen)  mg Tab 1 Tab PO Q6H PRN


Folic Acid 1 Mg Tablet 1 Mg PO DAILY


Hydrea (Hydroxyurea) 500 Mg Cap 500 Mg PO DAILY








Review of Systems


General / Constitutional:  No: Fever


Eyes:  No: Visual changes


HENT:  No: Headaches


Cardiovascular:  No: Chest Pain or Discomfort


Respiratory:  No: Shortness of Breath


Gastrointestinal:  No: Abdominal Pain


Genitourinary:  No: Dysuria


Musculoskeletal:  Positive: Myalgias, Pain


Skin:  No Rash


Neurologic:  No: Weakness


Psychiatric:  No: Depression


Endocrine:  No: Polydipsia


Hematologic/Lymphatic:  No: Easy Bruising





Physical Exam


Narrative


GENERAL: Patient was found on the bed in a fetal position rocking back and 

forth , crying and complaining of pain


SKIN: Warm and dry.


HEAD: Atraumatic. Normocephalic. 


EYES: Pupils equal and round. No scleral icterus. No injection or drainage.  

Conjunctival pallor


ENT: No nasal bleeding or discharge.  Mucous membranes pink and moist.


NECK: Trachea midline. No JVD. 


CARDIOVASCULAR: Regular rate and rhythm.  


RESPIRATORY: No accessory muscle use. Clear to auscultation. Breath sounds 

equal bilaterally. 


GASTROINTESTINAL: Abdomen soft, non-tender, nondistended. 


MUSCULOSKELETAL: Extremities without clubbing, cyanosis, or edema. No obvious 

deformities. 


NEUROLOGICAL: Awake and alert. No obvious cranial nerve deficits.  Motor 

grossly within normal limits. Five out of 5 muscle strength in the arms and 

legs.  Normal speech.


PSYCHIATRIC: Appropriate mood and affect; insight and judgment normal.





Data


Data


Last Documented VS





Orders





 Orders


Complete Blood Count With Diff (6/2/18 08:37)


Comprehensive Metabolic Panel (6/2/18 08:37)


Prothrombin Time / Inr (Pt) (6/2/18 08:37)


Act Partial Throm Time (Ptt) (6/2/18 08:37)


Lipase (6/2/18 08:37)


Chest, Single Ap (6/2/18 08:37)


Iv Access Insert/Monitor (6/2/18 08:37)


Ecg Monitoring (6/2/18 08:37)


Oximetry (6/2/18 08:37)


Type And Screen (6/2/18 08:37)


Sodium Chlor 0.9% 1000 Ml Inj (Ns 1000 M (6/2/18 08:45)


Sodium Chlor 0.9% 1000 Ml Inj (Ns 1000 M (6/2/18 08:45)


Retic Count (6/2/18 08:37)


Ldh Serum (6/2/18 08:37)


Hydromorphone Pf Inj (Dilaudid Pf Inj) (6/2/18 08:45)


Hydromorphone Pf Inj (Dilaudid Pf Inj) (6/2/18 08:45)


Hydromorphone Pf Inj (Dilaudid Pf Inj) (6/2/18 08:48)


Oxygen Administration (6/2/18 08:56)


Hydromorphone Pf Inj (Dilaudid Pf Inj) (6/2/18 10:15)


Hydromorphone Pf Inj (Dilaudid Pf Inj) (6/2/18 10:12)


Admit Order (Ed Use Only) (6/2/18 10:41)





Labs





Laboratory Tests








Test


  6/2/18


09:00


 


White Blood Count 13.7 TH/MM3 


 


Red Blood Count 2.84 MIL/MM3 


 


Hemoglobin 7.5 GM/DL 


 


Hematocrit 22.5 % 


 


Mean Corpuscular Volume 79.1 FL 


 


Mean Corpuscular Hemoglobin 26.5 PG 


 


Mean Corpuscular Hemoglobin


Concent 33.5 % 


 


 


Red Cell Distribution Width 17.5 % 


 


Platelet Count 251 TH/MM3 


 


Mean Platelet Volume 8.0 FL 


 


Neutrophils (%) (Auto) 61.7 % 


 


Lymphocytes (%) (Auto) 26.8 % 


 


Monocytes (%) (Auto) 6.0 % 


 


Eosinophils (%) (Auto) 4.5 % 


 


Basophils (%) (Auto) 1.0 % 


 


Neutrophils # (Auto) 8.5 TH/MM3 


 


Lymphocytes # (Auto) 3.7 TH/MM3 


 


Monocytes # (Auto) 0.8 TH/MM3 


 


Eosinophils # (Auto) 0.6 TH/MM3 


 


Basophils # (Auto) 0.1 TH/MM3 


 


CBC Comment AUTO DIFF 


 


Differential Total Cells


Counted 100 


 


 


Neutrophils % (Manual) 49 % 


 


Band Neutrophils % 5 % 


 


Lymphocytes % 41 % 


 


Monocytes % 1 % 


 


Eosinophils % 4 % 


 


Neutrophils # (Manual) 7.4 TH/MM3 


 


Nucleated Red Blood Cells 4 /100 WBC 


 


Differential Comment


  FINAL DIFF


MANUAL


 


Platelet Estimate NORMAL 


 


Platelet Morphology Comment NORMAL 


 


Polychromasia 2.2 % 


 


Sickle Cells 1+ 


 


Target Cells 1+ 


 


Baird-Pound Bodies PRESENT 


 


Reticulocyte Count 8.2 % 


 


Absolute Reticulocyte Count 232.0 MIL/L 


 


Prothrombin Time 11.6 SEC 


 


Prothromb Time International


Ratio 1.1 RATIO 


 


 


Activated Partial


Thromboplast Time 24.5 SEC 


 


 


Blood Urea Nitrogen 8 MG/DL 


 


Creatinine 0.70 MG/DL 


 


Random Glucose 94 MG/DL 


 


Total Protein 7.2 GM/DL 


 


Albumin 4.4 GM/DL 


 


Calcium Level 8.7 MG/DL 


 


Alkaline Phosphatase 61 U/L 


 


Aspartate Amino Transf


(AST/SGOT) 48 U/L 


 


 


Alanine Aminotransferase


(ALT/SGPT) 18 U/L 


 


 


Lactate Dehydrogenase 543 U/L 


 


Total Bilirubin 2.7 MG/DL 


 


Sodium Level 141 MEQ/L 


 


Potassium Level 3.7 MEQ/L 


 


Chloride Level 110 MEQ/L 


 


Carbon Dioxide Level 22.0 MEQ/L 


 


Anion Gap 9 MEQ/L 


 


Estimat Glomerular Filtration


Rate 168 ML/MIN 


 


 


Lipase 73 U/L 











MDM


Medical Decision Making


Medical Screen Exam Complete:  Yes


Emergency Medical Condition:  Yes


Medical Record Reviewed:  Yes


Differential Diagnosis


Chest syndrome versus pneumonia versus severe anemia versus aplastic anemia 

versus dehydration


Narrative Course


Reactive leukocytosis without left shift, anemia of 7.5 or 22.5 microcytic in 

nature, normal platelet count


Coagulation profile is within normal limits


Electrolytes are all within normal limits, normal kidney liver and pancreatic 

functions.


Patient's LDH is elevated as is expected in active sickle cell crisis


Patient was admitted for observation for transfusion and continue pain 

management.





Patient agreed with admission for observation and pain control, patient was 

assigned to family residents.





Diagnosis





 Primary Impression:  


 Sickle cell pain crisis


 Additional Impression:  


 Anemia


 Qualified Codes:  D64.9 - Anemia, unspecified





Admitting Information


Admitting Physician Requests:  Observation











Howie Macias MD Jun 2, 2018 10:33

## 2018-06-02 NOTE — HHI.HP
[facility]


Service


Family Medicine


Primary Care Physician


Unknown


Admission Diagnosis





Diagnoses:  


International Travel<30 Days:  No


Contact w/Intl Traveler<30days:  No


Known Affected Area:  No


History of Present Illness


23yo with sickle cell disease and G6PD deficiency presents with sickle cell 

pain crisis.  He reports that he has been fighting off a pain crisis for the 

past couple weeks. But since he is moving, going back home to [facility], the 

stress of moving, lack of money, roommate issues, and recent rain triggered his 

pain crisis. He reports that the pain became really bad, almost unbearble, at 

about 4am. He took his home medications of Percocet and hydroxyurea. Then pain 

woke him from sleep at 6:30-7am. He has been fighting the pain since then. 

Patient reports 10/10 pain in chest, back, shoulders down to the end of his 

spine. His pain crises are usually in chest, back , arms, and/or legs. 


Patient presents to the emergency department with sickle cell pain crises quite 

frequently. He used to follow up with Dr. Glover, however since he graduated 

from [employer] and was planning on moving back to [place], he no longer has 

follow-up appointments.  Patient reports associated nausea but without any 

vomiting or diarrhea.  Patient denies any shortness of breath, cough/sore throat

/runny nose or fever.


 (Landau,Michael A MD R2)





Review of Systems


Other


Denies fever or chills


No polyuria, polydipsia


Denies vision changes, eye pain, hearing changes, rhinorrhea, sore throat


Denies sore throat, runny nose, cough


Reports chest pain, back pain, denies palpitations, shortness of breath


No abdominal pain


Reports nausea


Denies constipation, diarrhea, vomiting, black or bloody stools


No dysuria, hematuria


Reports muscle/joint pain, denies weakness, headache


No rashes, itching


 (Landau,Michael A MD R2)





Past Family Social History


Past Medical History


G6PD deficiency - allergic to sulfas


Past Surgical History


circumcision


Reported Medications





Reported Meds & Active Scripts


Active


Percocet (Oxycodone-Acetaminophen)  mg Tab 1 Tab PO Q6H PRN


Folic Acid 1 Mg Tablet 1 Mg PO DAILY


Hydrea (Hydroxyurea) 500 Mg Cap 500 Mg PO DAILY


Exjade 


 (Landau,Michael A MD R2)


Allergies:  


Coded Allergies:  


     Sulfa (Sulfonamide Antibiotics) (Verified  Allergy, Intermediate, itching, 

18)


     morphine (Verified  Allergy, Mild, itching, 18)


Uncoded Allergies:  


     LEONARD BEANS (Allergy, Intermediate, 10/10/14)


Active Ordered Medications





Current Medications








 Medications


  (Trade)  Dose


 Ordered  Sig/Maisha


 Route  Start Time


 Stop Time Status Last Admin


 


 Dextrose/Sodium


 Chloride  1,000 ml @ 


 100 mls/hr  Q10H


 IV  18 10:57


    18 11:22


 


 


  (NS Flush)  2 ml  UNSCH  PRN


 IV FLUSH  18 11:00


     


 


 


  (NS Flush)  2 ml  BID


 IV FLUSH  18 21:00


     


 


 


  (Dilaudid Pf Inj)  1 mg  Q4H  PRN


 IV PUSH  18 11:00


     


 


 


  (Dilaudid Pf Inj)  2 mg  Q4H  PRN


 IV PUSH  18 11:00


     


 


 


  (Tylenol)  650 mg  Q4H  PRN


 PO  18 11:00


     


 


 


  (Folate)  1 mg  DAILY


 PO  18 11:00


    18 11:36


 


 


  (Theragran)  1 tab  DAILY


 PO  18 11:00


    18 11:38


 


 


  (Colace)  100 mg  BID


 PO  18 21:00


     


 


 


  (Albuterol Neb)  2.5 mg  Q2HR NEB  PRN


 INH  18 11:00


     


 


 


  (Duoneb Neb)  1 ampule  Q4HR NEB  PRN


 INH  18 11:00


     


 


 


  (Blanca-Colace)  2 tab  DAILY  PRN


 PO  18 11:00


     


 


 


  (Milk Of


 Magnesia Liq)  30 ml  Q6H  PRN


 PO  18 11:00


     


 


 


  (Benadryl)  25 mg  Q4H  PRN


 PO  18 11:00


     


 


 


  (Lovenox Inj)  40 mg  Q24H


 SQ  18 13:00


     


 


 


  (Zofran  Odt)  4 mg  Q4H  PRN


 PO  18 11:15


     


 


 


  (Hydrea)  500 mg  DAILY


 PO  6/3/18 09:00


     


 


 


  (Percocet 


 Mg)  1 tab  Q6H  PRN


 PO  18 13:15


     


 








Family History


Mom: sickle trait


Dad: sickle trait


Social History


Patient recently graduated from [employer] with a degree in 

political science, pre-law, going to pursue law school. Wants to do criminal 

defense and litigation. 


 (Landau,Michael A MD R2)





Physical Exam


Vital Signs





Vital Signs








  Date Time  Temp Pulse Resp B/P (MAP) Pulse Ox O2 Delivery O2 Flow Rate FiO2


 


18 10:33 98.1 85 13 131/75 (93) 100 Nasal Cannula 2.00 


 


18 09:21   15     


 


18 08:40     100 Nasal Cannula 2.00 


 


18 08:40   18  98 Room Air  


 


18 08:40 98.0 97 15 145/79 (101) 100 Nasal Cannula 2.00 


 


18 08:24 98.1 65 16 126/61 (82) 100   








Physical Exam


GENERAL: This is a young adult  male who is writhing and 

arching his back in pain, appears uncomfortable but stable


SKIN:  Cool and dry.


HEAD: Atraumatic. Normocephalic. 


EYES:  Extraocular motions intact. No scleral icterus. No injection or 

drainage. 


ENT:  Uvula midline. Airway patent. Moist mucous membranes


CARDIOVASCULAR: Regular rate and rhythm with 3 out of 6 systolic murmur but no 

gallops or rubs. 


RESPIRATORY: Clear to auscultation bilaterally. 


GASTROINTESTINAL: Abdomen soft, non-tender, nondistended. No rebounding or 

guarding.


MUSCULOSKELETAL: Extremities without clubbing, cyanosis, or edema. No joint 

tenderness, effusion, or edema noted. 


NEUROLOGICAL: Awake and alert. No focal deficits.  Motor and sensory grossly 

within normal limits.  Normal speech.


Laboratory





Laboratory Tests








Test


  18


09:00


 


White Blood Count 13.7 


 


Red Blood Count 2.84 


 


Hemoglobin 7.5 


 


Hematocrit 22.5 


 


Mean Corpuscular Volume 79.1 


 


Mean Corpuscular Hemoglobin 26.5 


 


Mean Corpuscular Hemoglobin


Concent 33.5 


 


 


Red Cell Distribution Width 17.5 


 


Platelet Count 251 


 


Mean Platelet Volume 8.0 


 


Neutrophils (%) (Auto) 61.7 


 


Lymphocytes (%) (Auto) 26.8 


 


Monocytes (%) (Auto) 6.0 


 


Eosinophils (%) (Auto) 4.5 


 


Basophils (%) (Auto) 1.0 


 


Neutrophils # (Auto) 8.5 


 


Lymphocytes # (Auto) 3.7 


 


Monocytes # (Auto) 0.8 


 


Eosinophils # (Auto) 0.6 


 


Basophils # (Auto) 0.1 


 


CBC Comment AUTO DIFF 


 


Differential Total Cells


Counted 100 


 


 


Neutrophils % (Manual) 49 


 


Band Neutrophils % 5 


 


Lymphocytes % 41 


 


Monocytes % 1 


 


Eosinophils % 4 


 


Neutrophils # (Manual) 7.4 


 


Nucleated Red Blood Cells 4 


 


Differential Comment


  FINAL DIFF


MANUAL


 


Platelet Estimate NORMAL 


 


Platelet Morphology Comment NORMAL 


 


Polychromasia 2.2 


 


Sickle Cells 1+ 


 


Target Cells 1+ 


 


[place] Bodies PRESENT 


 


Reticulocyte Count 8.2 


 


Absolute Reticulocyte Count 232.0 


 


Prothrombin Time 11.6 


 


Prothromb Time International


Ratio 1.1 


 


 


Activated Partial


Thromboplast Time 24.5 


 


 


Blood Urea Nitrogen 8 


 


Creatinine 0.70 


 


Random Glucose 94 


 


Total Protein 7.2 


 


Albumin 4.4 


 


Calcium Level 8.7 


 


Alkaline Phosphatase 61 


 


Aspartate Amino Transf


(AST/SGOT) 48 


 


 


Alanine Aminotransferase


(ALT/SGPT) 18 


 


 


Lactate Dehydrogenase 543 


 


Total Bilirubin 2.7 


 


Sodium Level 141 


 


Potassium Level 3.7 


 


Chloride Level 110 


 


Carbon Dioxide Level 22.0 


 


Anion Gap 9 


 


Estimat Glomerular Filtration


Rate 168 


 


 


Lipase 73 








 (Landau,Michael A MD R2)


Result Diagram:  


18 0918 09





Imaging





Last Impressions








Chest X-Ray 18 0837 Signed





Impressions: 





 CONCLUSION: 





 Negative examination.





  





 








Course


In the emergency department, patient had LDH, Dilaudid IV, reticular count, 

normal saline IV bolus 2 L, type and screen, chest x-ray, lipase, APTT, PT/INR

, CMP, CBC, Dilaudid, admission order.


 (Landau,Michael A MD R2)





Caprini VTE Risk Assessment


Caprini VTE Risk Assessment:  No/Low Risk (score <= 1)


Caprini Risk Assessment Model











 Point Value = 1          Point Value = 2  Point Value = 3  Point Value = 5


 


Age 41-60


Minor surgery


BMI > 25 kg/m2


Swollen legs


Varicose veins


Pregnancy or postpartum


History of unexplained or recurrent


   spontaneous 


Oral contraceptives or hormone


   replacement


Sepsis (< 1 month)


Serious lung disease, including


   pneumonia (< 1 month)


Abnormal pulmonary function


Acute myocardial infarction


Congestive heart failure (< 1 month)


History of inflammatory bowel disease


Medical patient at bed rest Age 61-74


Arthroscopic surgery


Major open surgery (> 45 min)


Laparoscopic surgery (> 45 min)


Malignancy


Confined to bed (> 72 hours)


Immobilizing plaster cast


Central venous access Age >= 75


History of VTE


Family history of VTE


Factor V Leiden


Prothrombin [id number]


Lupus anticoagulant


Anticardiolipin antibodies


Elevated serum homocysteine


Heparin-induced thrombocytopenia


Other congenital or acquired


   thrombophilia Stroke (< 1 month)


Elective arthroplasty


Hip, pelvis, or leg fracture


Acute spinal cord injury (< 1 month)








Prophylaxis Regimen











   Total Risk


Factor Score Risk Level Prophylaxis Regimen


 


0-1      Low Early ambulation


 


2 Moderate Order ONE of the following:


*Sequential Compression Device (SCD)


*Heparin 5000 units SQ BID


 


3-4 Higher Order ONE of the following medications:


*Heparin 5000 units SQ TID


*Enoxaparin/Lovenox 40 mg SQ daily (WT < 150 kg, CrCl > 30 mL/min)


*Enoxaparin/Lovenox 30 mg SQ daily (WT < 150 kg, CrCl > 10-29 mL/min)


*Enoxaparin/Lovenox 30 mg SQ BID (WT < 150 kg, CrCl > 30 mL/min)


AND/OR


*Sequential Compression Device (SCD)


 


5 or more Highest Order ONE of the following medications:


*Heparin 5000 units SQ TID (Preferred with Epidurals)


*Enoxaparin/Lovenox 40 mg SQ daily (WT < 150 kg, CrCl > 30 mL/min)


*Enoxaparin/Lovenox 30 mg SQ daily (WT < 150 kg, CrCl > 10-29 mL/min)


*Enoxaparin/Lovenox 30 mg SQ BID (WT < 150 kg, CrCl > 30 mL/min)


AND


*Sequential Compression Device (SCD)








 (Landau,Michael A MD R2)





Assessment and Plan


Assessment and Plan


23yo with sickle cell disease and G6PD deficiency presents with sickle cell 

pain crisis.  Plan to admit for IV fluids and IV pain control.


Code Status


Full code


Discussed Condition With


d/w Dr. Ram


 (Landau,Michael A MD R2)


Attending Attestation


Pt seen and examined, discussed with resident team. I agree with assessment and 

management as documented and discussed with me.





Elton Mcgee is a 23yo gentleman with sickle cell disease and G6PD admitted for 

sickle cell pain crisis, likely brought on by dehydration after having to move 

out of his apartment and clean it over the last few days. 


His pain is in chest, nape of neck, and back. He denies SOB, fever, cough. 





Corrected documentation:


PMH: includes sickle cell disease





Additional diagnosis:


leukocytosis: likely secondary to stress response. Pt denies any infection-

related symptoms.





Continue IV fluid, pain medication. 


 (Melisa Ram MD)


Problem List:  


(1) Sickle cell pain crisis


ICD Codes:  D57.00 - Hb-SS disease with crisis, unspecified


Status:  Acute


Plan:  Vitals stable


Satting comfortably on room air


No increased work of breathing, currently denies cough or SOB


Lung exam benign





Home percocet pain 1-5


IV Dilaudid 1 mg Q4 6-10


IV Dilaudid 1 mg IV Q4H breakthrough


Con't home hydroxurea


Exjade is not available; pt can bring home med 


Folic acid, Multivitamin


Maintenance IVF with D5 1/2NS


Incentive spirometry





(2) Sickle cell anemia


ICD Codes:  D57.1 - Sickle cell disease


Status:  Chronic


Plan:  Patient reports that he has been chronically transfused, on Exjade at 

home


-home medications as above





(3) G6P deficiency (glucose-6-phosphatase deficiency)


ICD Codes:  E74.01 - G6P deficiency (glucose-6-phosphatase deficiency)


Status:  Acute


Plan:  -Avoid sulfas and leonard beans





(4) FEN


Plan:  Fluids: Maintenance IVF with D5 1/2 NS


Electrolytes: monitor and replete as needed


Nutrition: Regular


DVT prophy: Lovenox


GI ppx: not indicated


 (Landau,Michael A MD R2)











Landau,Michael A MD R2 2018 10:46


Melisa Ram MD 2018 20:24

## 2018-06-02 NOTE — HHI.DCPOC
Discharge Care Plan


Diagnosis:  


(1) Vaso-occlusive sickle cell crisis


(2) Sickle cell pain crisis


Goals to Promote Your Health


* To prevent worsening of your condition and complications, please stay well 

hydrated, take medications as prescribed, avoid stress. Please come back to the 

hospital if you experience fever, chills, shortness of breath, or worsening 

chest pain. 


* To maintain your health at the optimal level, please follow up with your 

doctor.


Directions to Meet Your Goals


*** Take your medications as prescribed


*** Follow your dietary instruction


*** Follow activity as directed








*** Keep your appointments as scheduled


*** Take your immunizations and boosters as scheduled


*** If your symptoms worsen call your PCP, if no PCP go to Urgent Care Center 

or Emergency Room***


*** Smoking is Dangerous to Your Health. Avoid second hand smoke***


***Call the 24-hour hour crisis hotline for domestic abuse at 1-500.465.8372***











Landau,Michael A MD R2 Jun 2, 2018 20:15

## 2018-06-02 NOTE — HHI.PR
Addendum to Inpatient Note


Addendum Reason:  Additional Documentation


Additional Information


Patient is requesting discharge. He feels he can manage his pain crisis at 

home. He has a ride here now and would like to leave now. He reports that his 

chest pain is greatly improved, his urine has gone from dark to light. He still 

denies any fever, chills, SOB, cough or cold symptoms. He has a pain 

prescription from Dr. Glover, so he does not need an additional prescription 

for pain medication. Discussed indications to return to the hospital. Patient 

expressed understanding and consent. Thus, agreed to proceed with discharge. 


briefly d/w Dr. Vey Landau,Kel RODRIGUEZ MD R2 Jun 2, 2018 20:20

## 2018-06-02 NOTE — RADRPT
EXAM DATE:  6/2/2018 9:02 AM EDT

AGE/SEX:        22 years / Male



INDICATIONS:  r/o chest syndrome.



CLINICAL DATA:  This is the patient's initial encounter. Patient reports that signs and symptoms have
 been present for 1 day and indicates a pain score of 10/10. 

                                                                          

MEDICAL/SURGICAL HISTORY:       Sickle Cell disease. None.



[place]      [facility] [place]  .



FINDINGS:  

A single AP view of the chest demonstrates the lungs to be symmetrically aerated without evidence of 
mass, infiltrate or effusion.  The cardiomediastinal contours are unremarkable.  Osseous structures a
re intact.  





CONCLUSION: 

Negative examination.



Electronically signed by: Mercedez Simmons MD  6/2/2018 9:03 AM EDT

## 2021-04-08 NOTE — EKG
Date Performed: 05/09/2018       Time Performed: 06:39:06

 

PTAGE:      22 years

 

EKG:      Sinus rhythm 

 

 NORMAL ECG 

 

NO PREVIOUS TRACING            

 

DOCTOR:   Fadi Ko  Interpretating Date/Time  05/09/2018 09:09:21 MRN: [id number]